# Patient Record
Sex: FEMALE | Race: WHITE | NOT HISPANIC OR LATINO | Employment: FULL TIME | URBAN - METROPOLITAN AREA
[De-identification: names, ages, dates, MRNs, and addresses within clinical notes are randomized per-mention and may not be internally consistent; named-entity substitution may affect disease eponyms.]

---

## 2021-01-20 ENCOUNTER — MEDICAL CORRESPONDENCE (OUTPATIENT)
Dept: HEALTH INFORMATION MANAGEMENT | Facility: CLINIC | Age: 31
End: 2021-01-20

## 2021-01-20 ENCOUNTER — TRANSFERRED RECORDS (OUTPATIENT)
Dept: HEALTH INFORMATION MANAGEMENT | Facility: CLINIC | Age: 31
End: 2021-01-20

## 2021-01-21 ENCOUNTER — TRANSCRIBE ORDERS (OUTPATIENT)
Dept: OTHER | Age: 31
End: 2021-01-21

## 2021-01-21 ENCOUNTER — TRANSFERRED RECORDS (OUTPATIENT)
Dept: HEALTH INFORMATION MANAGEMENT | Facility: CLINIC | Age: 31
End: 2021-01-21

## 2021-01-21 DIAGNOSIS — R14.0 GASSINESS: Primary | ICD-10-CM

## 2021-02-07 ENCOUNTER — HEALTH MAINTENANCE LETTER (OUTPATIENT)
Age: 31
End: 2021-02-07

## 2021-02-09 NOTE — TELEPHONE ENCOUNTER
REFERRAL INFORMATION:    Referring Provider:  RIAZ Huddleston     Referring Clinic:  [facility]     Reason for Visit/Diagnosis: Gassiness, Polyp of colon      FUTURE VISIT INFORMATION:    Appointment Date: 3/2/2021    Appointment Time: 11 AM      NOTES STATUS DETAILS   OFFICE NOTE from Referring Provider Received/ Care Everywhere 1/20/2021 Office visit with Dom Siddiqi PA-C   OFFICE NOTE from Other Specialist N/A    HOSPITAL DISCHARGE SUMMARY/  ED VISITS N/A    OPERATIVE REPORT N/A    MEDICATION LIST N/A         ENDOSCOPY  N/A    COLONOSCOPY N/A    ERCP N/A    EUS N/A    STOOL TESTING N/A    PERTINENT LABS Internal    PATHOLOGY REPORTS (RELATED) N/A    IMAGING (CT, MRI, EGD, MRCP, Small Bowel Follow Through/SBT, MR/CT Enterography) N/A

## 2021-03-02 ENCOUNTER — VIRTUAL VISIT (OUTPATIENT)
Dept: GASTROENTEROLOGY | Facility: CLINIC | Age: 31
End: 2021-03-02
Payer: COMMERCIAL

## 2021-03-02 ENCOUNTER — PRE VISIT (OUTPATIENT)
Dept: GASTROENTEROLOGY | Facility: CLINIC | Age: 31
End: 2021-03-02

## 2021-03-02 VITALS — HEIGHT: 67 IN | BODY MASS INDEX: 26.84 KG/M2 | WEIGHT: 171 LBS

## 2021-03-02 DIAGNOSIS — K62.89 RECTAL PAIN: Primary | ICD-10-CM

## 2021-03-02 DIAGNOSIS — R14.0 BLOATING: ICD-10-CM

## 2021-03-02 PROCEDURE — 99204 OFFICE O/P NEW MOD 45 MIN: CPT | Mod: 95 | Performed by: INTERNAL MEDICINE

## 2021-03-02 RX ORDER — LEVONORGESTREL/ETHIN.ESTRADIOL 0.1-0.02MG
1 TABLET ORAL
COMMUNITY
Start: 2013-01-01 | End: 2022-02-14

## 2021-03-02 RX ORDER — LACTOBACILLUS RHAMNOSUS GG 10B CELL
1 CAPSULE ORAL 2 TIMES DAILY
COMMUNITY
End: 2023-03-29

## 2021-03-02 RX ORDER — HYDROCORTISONE 25 MG/G
CREAM TOPICAL 2 TIMES DAILY PRN
Qty: 28 G | Refills: 3 | Status: SHIPPED | OUTPATIENT
Start: 2021-03-02 | End: 2021-04-10

## 2021-03-02 SDOH — HEALTH STABILITY: MENTAL HEALTH: HOW MANY STANDARD DRINKS CONTAINING ALCOHOL DO YOU HAVE ON A TYPICAL DAY?: NOT ASKED

## 2021-03-02 SDOH — HEALTH STABILITY: MENTAL HEALTH: HOW OFTEN DO YOU HAVE A DRINK CONTAINING ALCOHOL?: NOT ASKED

## 2021-03-02 SDOH — HEALTH STABILITY: MENTAL HEALTH: HOW OFTEN DO YOU HAVE 6 OR MORE DRINKS ON ONE OCCASION?: NOT ASKED

## 2021-03-02 ASSESSMENT — MIFFLIN-ST. JEOR: SCORE: 1538.28

## 2021-03-02 ASSESSMENT — PAIN SCALES - GENERAL: PAINLEVEL: SEVERE PAIN (6)

## 2021-03-02 NOTE — NURSING NOTE
"Chief Complaint   Patient presents with     Consult     New patient consult for gassiness and colon polyp. Video visit.        Vitals:    03/02/21 1040   Weight: 77.6 kg (171 lb)   Height: 1.702 m (5' 7\")       Body mass index is 26.78 kg/m .    Edie Nielsen LPN      "

## 2021-03-02 NOTE — LETTER
3/2/2021         RE: Selina Jimenez  [id number] [address] [address]        Dear Colleague,    Thank you for referring your patient, Selina Jimenez, to the [facility] [place]. Please see a copy of my visit note below.    Addendum:    Patient continuing with low FODMAP diet. Will refer to Alfa Shrestha for further counseling.    Selina is a 30 year old who is being evaluated via a billable video visit.      How would you like to obtain your AVS? MyChart  If the video visit is dropped, the invitation should be resent by: Text to cell phone: [phone]  Will anyone else be joining your video visit? No      Video-Visit Details    Type of service:  Video Visit    Video Start Time: 11:00    Video End Time: 11:45    Originating Location (pt. Location):home    Distant Location (provider location):  [facility] [place]     Platform used for Video Visit: [employer]    GI CLINIC VISIT    REASON FOR CONSULTATION:   Gassiness, Hx of colon polyps    ASSESSMENT/PLAN:  29yo relatively healthy F here for initial clinic visit. Patient reports symptoms of gassiness and bloating and painful defecation and development of hemorrhoids. One episode of rectal bleeding. Her primary symptoms include bloating and increased flatus which maybe due to aerophagia (excess air swallowing), functional bloating, dietary factors, celiac disease, and less likely bacterial overgrowth or gastroparesis ( no risk factors for these). No associated constipation. In light of the rectal bleeding and painful defecation, I would like to pursue a colonoscopy. Furthermore, the patient reports a hx of ?rectal polyps on prior colonoscopy, which were benign. I didn't see a colonoscopy report. Patient was advised to have a repeat colonoscopy in 5 years    Recommendations:  - Colonoscopy to evaluate the hx of rectal polyps  - Hydrocortisone 2.5% cream (Anusol-HC) for the hemorrhoids  -  Referral to Alfa Shrestha dietitian regarding a low FODMAP diet  - Restart fiber supplementation with citrucel or benefiber    RTC in 3 months    Thank you for this consultation.  It was a pleasure to participate in the care of this patient; please contact us with any further questions.  A total of 45 minutes spent on the date of the encounter doing chart review, history and exam, documentation and further activities as noted above. This note was created with voice recognition software, and while reviewed for accuracy, typos may remain.     Kathi Banda MD  Associate Professor of Medicine  Division of Gastroenterology, Hepatology and Nutrition  [facility]      HPI  29yo relatively healthy F here for initial clinic visit. Patient reports symptoms of gassiness and bloating and pain with defecation and development of hemorrhoids Per patient, last spring (April-May) started feeling very bloated and gassy, especially at night. She made dietary changes and did weight watchers and lost 20 pounds and started feeling better. Over the summer she had symptoms on and off. Last fall, school started up and she started developing gassiness and bloating again. Most of Dec and Jan she reported daily symptoms. Every day after eating a emal and within 30 min, she felt bloated gassiness and gassy (associated with smelliness). She thought that certain foods were triggering so she started experimenting with different foods for several months. Recently over the last month her PCP recommended low LODMAP diet. Since starting this, she states this is the first month that her symptoms have gotten better. She is not having as much gas and she thinks her flatus is less stinky. She still thinks that she has hemorrhoids.    Her bowel habits are irregular. She reports a BM every other day to every three days. Consistency depends on what she eats. BSFS 3-4. Currently though she is reporting some pain with defecation. No straining or  pushing. No hard stools. She eats fruits and vegetables and has recently increased her fiber intake. She tried fiber supplements but was worried about using benefiber (which is a wheat dextran).She has tried to cut out wheat because this may be a trigger (though on the occasion when she did have some wheat, it was not detrimental. No hematochezia or melena but she did see a small amount of blood in the toilet in the Fall. Pt states she has had colon polyps- benign per patient. She states that she had severe abdominal pain in the past which prompted the colonoscopy. She states that she was told to get colonoscopy every 5 years.    No family hx of any GI cancers. No weight loss. No prior endoscopies. Labs reviewed from 2/5/21    ROS:    No fevers or chillshe  No weight loss  No blurry vision, double vision or change in vision  No sore throat  No lymphadenopathy  No headache, paraesthesias, or weakness in a limb  No shortness of breath or wheezing  No chest pain or pressure  No arthralgias or myalgias  No rashes or skin changes  No odynophagia or dysphagia  No BRBPR, hematochezia, melena  No dysuria, frequency or urgency  No hot/cold intolerance or polyria  No anxiety or depression      PERTINENT PAST MEDICAL HISTORY:  Hyperlipidemia    PREVIOUS SURGERIES:  None    PREVIOUS ENDOSCOPY:  None    ALLERGIES:     Allergies   Allergen Reactions     Cefaclor Rash     Pt states she had allergy as a infant (Rash)          PERTINENT MEDICATIONS:    Current Outpatient Medications:      Calcium 200 MG TABS, , Disp: , Rfl:      lactobacillus rhamnosus, GG, (CULTURELL) capsule, Take 1 capsule by mouth 2 times daily, Disp: , Rfl:      levonorgestrel-ethinyl estradiol (AVIANE) 0.1-20 MG-MCG tablet, Take 1 tablet by mouth, Disp: , Rfl:      Multiple Vitamins-Minerals (MULTIVITAMIN WOMEN PO), , Disp: , Rfl:     SOCIAL HISTORY:  Single, never smoker, occasional EtOh,     FAMILY HISTORY:    Family History   Problem Relation Age of Onset  "    Bladder Cancer Maternal Grandfather      Lymphoma Paternal Grandmother        PHYSICAL EXAMINATION:  Constitutional: aaox3, cooperative, pleasant, not dyspneic/diaphoretic, no acute distress  Vitals reviewed: Ht 1.702 m (5' 7\")   Wt 77.6 kg (171 lb)   BMI 26.78 kg/m    Wt:   Wt Readings from Last 2 Encounters:   03/02/21 77.6 kg (171 lb)      Eyes: Sclera anicteric/injected  Respiratory: Unlabored breathing  Skin: , no jaundice  Psych: Normal affect  MSK: Normal gait    PERTINENT STUDIES:  Most recent labs 2/5/21     WBC 7.0 3.2 - 11.0 10*9/L   RBC 4.53 3.77 - 5.24 10*12/L   HGB 13.0 11.2 - 15.5 g/dL   HCT 39.2 34.3 - 46.0 %   MCV 86.5 81.4 - 99.0 fL   MCH 28.7 26.7 - 33.1 pg   MCHC 33.2 31.6 - 35.5 g/dL   RDW 12.2 11.3 - 14.6 %    130 - 375 10*9/L   Neutrophils % 50.5 %   Lymphocytes % 36.2 %   Monocytes % 10.8 %   Eosinophils % 1.7 %   Basophils % 0.7 %   Immature Granulocytes % 0.1 %   Neutrophils Absolute 3.5 1.5 - 7.6 10*9/L   Lymphocytes Absolute 2.5 0.8 - 3.3 10*9/L   Monocytes Absolute 0.8 0.2 - 0.9 10*9/L   Eosinophils Absolute 0.1 0.0 - 0.4 10*9/L   Basophils Absolute 0.1 0.0 - 0.1 10*9/L   Immature Granulocytes Absolute 0.01 0.00 - 0.06 10*9/L       Component Value Ref Range   Sodium 141 134 - 143 mEq/L   Potassium 3.8 3.4 - 5.1 mEq/L   Chloride 105 99 - 110 mEq/L   Carbon Dioxide 26 19 - 29 mEq/L   Anion Gap 10.0 3.0 - 15.0 mEq/L   Blood Urea Nitrogen 10 5 - 24 mg/dL   Creatinine 0.78 0.40 - 1.00 mg/dL   Glomerular Filtration Rate  >60 mL/min/1.73 m*2   Calcium 9.2 8.4 - 10.5 mg/dL   Glucose 90 70 - 99 mg/dL   Protein, Total 7.0 6.0 - 8.0 g/dL   Albumin 3.9 3.5 - 5.0 g/dL   Alkaline Phosphatase 57 40 - 150 IU/L   Aspartate Aminotransferase 19 10 - 40 IU/L   Alanine Aminotransferase 15 6 - 31 IU/L   Bilirubin, Total 0.4 0.2 - 1.2 mg/dL       Again, thank you for allowing me to participate in the care of your patient.      Sincerely,    Kathi Banda MD    "

## 2021-03-02 NOTE — PATIENT INSTRUCTIONS
It was a pleasure to talk to you today.    My recommendations are summarized below:    - Colonoscopy to evaluate the hx of rectal polyps  - Hydrocortisone 2.5% cream (Anusol-HC) for the hemorrhoids  - Referral to Alfa Shrestha dietitian regarding a low FODMAP diet  - Restart fiber supplementation with citrucel or benefiber      RTC in 2 months    -Dr Banda  Gastroenterology

## 2021-03-02 NOTE — PROGRESS NOTES
Addendum:    Patient continuing with low FODMAP diet. Will refer to Alfa Shrestha for further counseling.    Selina is a 30 year old who is being evaluated via a billable video visit.      How would you like to obtain your AVS? MyChart  If the video visit is dropped, the invitation should be resent by: Text to cell phone: [phone]  Will anyone else be joining your video visit? No      Video Start Time: 11:00      Video-Visit Details    Type of service:  Video Visit    Video End Time: 11:45    Originating Location (pt. Location):home    Distant Location (provider location):  [facility] [place]     Platform used for Video Visit: [employer]    GI CLINIC VISIT    REASON FOR CONSULTATION:   Gassiness, Hx of colon polyps      ASSESSMENT/PLAN:  31yo relatively healthy F here for initial clinic visit. Patient reports symptoms of gassiness and bloating and painful defecation and development of hemorrhoids. One episode of rectal bleeding. Her primary symptoms include bloating and increased flatus which maybe due to aerophagia (excess air swallowing), functional bloating, dietary factors, celiac disease, and less likely bacterial overgrowth or gastroparesis ( no risk factors for these). No associated constipation. In light of the rectal bleeding and painful defecation, I would like to pursue a colonoscopy. Furthermore, the patient reports a hx of ?rectal polyps on prior colonoscopy, which were benign. I didn't see a colonoscopy report. Patient was advised to have a repeat colonoscopy in 5 years    Recommendations:  - Colonoscopy to evaluate the hx of rectal polyps  - Hydrocortisone 2.5% cream (Anusol-HC) for the hemorrhoids  - Referral to Alfa Shrestha dietitian regarding a low FODMAP diet  - Restart fiber supplementation with citrucel or benefiber    RTC in 3 months    Thank you for this consultation.  It was a pleasure to participate in the care of this patient; please contact us with any  further questions.  A total of 45 minutes spent on the date of the encounter doing chart review, history and exam, documentation and further activities as noted above. This note was created with voice recognition software, and while reviewed for accuracy, typos may remain.       Kathi Banda MD  Associate Professor of Medicine  Division of Gastroenterology, Hepatology and Nutrition  [facility]      HPI  29yo relatively healthy F here for initial clinic visit. Patient reports symptoms of gassiness and bloating and pain with defecation and development of hemorrhoids Per patient, last spring (April-May) started feeling very bloated and gassy, especially at night. She made dietary changes and did weight watchers and lost 20 pounds and started feeling better. Over the summer she had symptoms on and off. Last fall, school started up and she started developing gassiness and bloating again. Most of Dec and Jan she reported daily symptoms. Every day after eating a emal and within 30 min, she felt bloated gassiness and gassy (associated with smelliness). She thought that certain foods were triggering so she started experimenting with different foods for several months. Recently over the last month her PCP recommended low LODMAP diet. Since starting this, she states this is the first month that her symptoms have gotten better. She is not having as much gas and she thinks her flatus is less stinky. She still thinks that she has hemorrhoids.    Her bowel habits are irregular. She reports a BM every other day to every three days. Consistency depends on what she eats. BSFS 3-4. Currently though she is reporting some pain with defecation. No straining or pushing. No hard stools. She eats fruits and vegetables and has recently increased her fiber intake. She tried fiber supplements but was worried about using benefiber (which is a wheat dextran).She has tried to cut out wheat because this may be a trigger (though on  "the occasion when she did have some wheat, it was not detrimental. No hematochezia or melena but she did see a small amount of blood in the toilet in the Fall. Pt states she has had colon polyps- benign per patient. She states that she had severe abdominal pain in the past which prompted the colonoscopy. She states that she was told to get colonoscopy every 5 years.    No family hx of any GI cancers. No weight loss. No prior endoscopies. Labs reviewed from 2/5/21    ROS:    No fevers or chillshe  No weight loss  No blurry vision, double vision or change in vision  No sore throat  No lymphadenopathy  No headache, paraesthesias, or weakness in a limb  No shortness of breath or wheezing  No chest pain or pressure  No arthralgias or myalgias  No rashes or skin changes  No odynophagia or dysphagia  No BRBPR, hematochezia, melena  No dysuria, frequency or urgency  No hot/cold intolerance or polyria  No anxiety or depression      PERTINENT PAST MEDICAL HISTORY:  Hyperlipidemia    PREVIOUS SURGERIES:  None    PREVIOUS ENDOSCOPY:  None    ALLERGIES:     Allergies   Allergen Reactions     Cefaclor Rash     Pt states she had allergy as a infant (Rash)          PERTINENT MEDICATIONS:    Current Outpatient Medications:      Calcium 200 MG TABS, , Disp: , Rfl:      lactobacillus rhamnosus, GG, (CULTURELL) capsule, Take 1 capsule by mouth 2 times daily, Disp: , Rfl:      levonorgestrel-ethinyl estradiol (AVIANE) 0.1-20 MG-MCG tablet, Take 1 tablet by mouth, Disp: , Rfl:      Multiple Vitamins-Minerals (MULTIVITAMIN WOMEN PO), , Disp: , Rfl:     SOCIAL HISTORY:  Single, never smoker, occasional EtOh,     FAMILY HISTORY:    Family History   Problem Relation Age of Onset     Bladder Cancer Maternal Grandfather      Lymphoma Paternal Grandmother        PHYSICAL EXAMINATION:  Constitutional: aaox3, cooperative, pleasant, not dyspneic/diaphoretic, no acute distress  Vitals reviewed: Ht 1.702 m (5' 7\")   Wt 77.6 kg (171 lb)   BMI 26.78 " kg/m    Wt:   Wt Readings from Last 2 Encounters:   03/02/21 77.6 kg (171 lb)      Eyes: Sclera anicteric/injected  Respiratory: Unlabored breathing  Skin: , no jaundice  Psych: Normal affect  MSK: Normal gait      PERTINENT STUDIES:  Most recent labs 2/5/21     WBC 7.0 3.2 - 11.0 10*9/L   RBC 4.53 3.77 - 5.24 10*12/L   HGB 13.0 11.2 - 15.5 g/dL   HCT 39.2 34.3 - 46.0 %   MCV 86.5 81.4 - 99.0 fL   MCH 28.7 26.7 - 33.1 pg   MCHC 33.2 31.6 - 35.5 g/dL   RDW 12.2 11.3 - 14.6 %    130 - 375 10*9/L   Neutrophils % 50.5 %   Lymphocytes % 36.2 %   Monocytes % 10.8 %   Eosinophils % 1.7 %   Basophils % 0.7 %   Immature Granulocytes % 0.1 %   Neutrophils Absolute 3.5 1.5 - 7.6 10*9/L   Lymphocytes Absolute 2.5 0.8 - 3.3 10*9/L   Monocytes Absolute 0.8 0.2 - 0.9 10*9/L   Eosinophils Absolute 0.1 0.0 - 0.4 10*9/L   Basophils Absolute 0.1 0.0 - 0.1 10*9/L   Immature Granulocytes Absolute 0.01 0.00 - 0.06 10*9/L       Component Value Ref Range   Sodium 141 134 - 143 mEq/L   Potassium 3.8 3.4 - 5.1 mEq/L   Chloride 105 99 - 110 mEq/L   Carbon Dioxide 26 19 - 29 mEq/L   Anion Gap 10.0 3.0 - 15.0 mEq/L   Blood Urea Nitrogen 10 5 - 24 mg/dL   Creatinine 0.78 0.40 - 1.00 mg/dL   Glomerular Filtration Rate  >60 mL/min/1.73 m*2   Calcium 9.2 8.4 - 10.5 mg/dL   Glucose 90 70 - 99 mg/dL   Protein, Total 7.0 6.0 - 8.0 g/dL   Albumin 3.9 3.5 - 5.0 g/dL   Alkaline Phosphatase 57 40 - 150 IU/L   Aspartate Aminotransferase 19 10 - 40 IU/L   Alanine Aminotransferase 15 6 - 31 IU/L   Bilirubin, Total 0.4 0.2 - 1.2 mg/dL

## 2021-04-10 RX ORDER — HYDROCORTISONE 25 MG/G
CREAM TOPICAL 2 TIMES DAILY PRN
Qty: 28 G | Refills: 3 | Status: SHIPPED | OUTPATIENT
Start: 2021-04-10 | End: 2022-06-13

## 2021-04-14 ENCOUNTER — TELEPHONE (OUTPATIENT)
Dept: GASTROENTEROLOGY | Facility: OUTPATIENT CENTER | Age: 31
End: 2021-04-14

## 2021-04-14 NOTE — TELEPHONE ENCOUNTER
Patient is scheduled for COLON with Dr. GRIMALDO    Spoke with: PATIENT    Date of Procedure: 5/12    Location: [facility]    Sedation Type MAC    Conscious Sedation- Needs  for 6 hours after the procedure  MAC/General-Needs  for 24 hours after procedure    Pre-op for Unit [facility] MAC and ORN    (if yes advise patient they will need a pre-op prior to procedure)      Is patient on blood thinners? -N (If yes- inform patient to follow up with PCP or provider for follow up instructions)     Informed patient they will need an adult  Y  Cannot take any type of public or medical transportation alone    Informed Patient of COVID Test Requirement Y-SCHED    Preferred Pharmacy for Pre Prescription N/A    Confirmed Nurse will call to complete assessment Y    Additional comments: N/A

## 2021-04-28 DIAGNOSIS — Z11.59 ENCOUNTER FOR SCREENING FOR OTHER VIRAL DISEASES: ICD-10-CM

## 2021-05-04 ENCOUNTER — TELEPHONE (OUTPATIENT)
Dept: GASTROENTEROLOGY | Facility: OUTPATIENT CENTER | Age: 31
End: 2021-05-04

## 2021-05-07 ENCOUNTER — TELEPHONE (OUTPATIENT)
Dept: GASTROENTEROLOGY | Facility: OUTPATIENT CENTER | Age: 31
End: 2021-05-07

## 2021-05-10 DIAGNOSIS — Z11.59 ENCOUNTER FOR SCREENING FOR OTHER VIRAL DISEASES: ICD-10-CM

## 2021-05-10 LAB
SARS-COV-2 RNA RESP QL NAA+PROBE: NORMAL
SPECIMEN SOURCE: NORMAL

## 2021-05-10 PROCEDURE — U0005 INFEC AGEN DETEC AMPLI PROBE: HCPCS | Performed by: INTERNAL MEDICINE

## 2021-05-10 PROCEDURE — U0003 INFECTIOUS AGENT DETECTION BY NUCLEIC ACID (DNA OR RNA); SEVERE ACUTE RESPIRATORY SYNDROME CORONAVIRUS 2 (SARS-COV-2) (CORONAVIRUS DISEASE [COVID-19]), AMPLIFIED PROBE TECHNIQUE, MAKING USE OF HIGH THROUGHPUT TECHNOLOGIES AS DESCRIBED BY CMS-2020-01-R: HCPCS | Performed by: INTERNAL MEDICINE

## 2021-05-11 ENCOUNTER — TELEPHONE (OUTPATIENT)
Dept: GASTROENTEROLOGY | Facility: OUTPATIENT CENTER | Age: 31
End: 2021-05-11

## 2021-05-11 LAB
LABORATORY COMMENT REPORT: NORMAL
SARS-COV-2 RNA RESP QL NAA+PROBE: NEGATIVE
SPECIMEN SOURCE: NORMAL

## 2021-05-11 NOTE — TELEPHONE ENCOUNTER
Patient taking any blood thinners ? No      Heart disease ? denies     Lung disease ? denies     Sleep apnea ? denies     Diabetic ? denies     Kidney disease ?denies     Electronic implanted medical devices ? denies     PTSD ? N/a      Prep instructions reviewed with patient ? Instructions, policy,MAC sedation plan reviewed. Advised patient to have someone stay with them for 24 hours   post exam.     Yes    Pharmacy : N/a     Indication for procedure : Rectal pain; Bloating     Referring provider : Dom Siddiqi PA-C      Arrival Time : 12 PM    Covid test 5-10 at [facility]    Patient states she is not pregnant or lactating

## 2021-05-12 ENCOUNTER — DOCUMENTATION ONLY (OUTPATIENT)
Dept: GASTROENTEROLOGY | Facility: OUTPATIENT CENTER | Age: 31
End: 2021-05-12
Payer: COMMERCIAL

## 2021-05-12 ENCOUNTER — TRANSFERRED RECORDS (OUTPATIENT)
Dept: HEALTH INFORMATION MANAGEMENT | Facility: CLINIC | Age: 31
End: 2021-05-12

## 2021-05-13 LAB — COPATH REPORT: NORMAL

## 2021-07-11 NOTE — RESULT ENCOUNTER NOTE
Dear Selina,    I am writing to follow up on your colonoscopy report. We removed one polyp from your colon but the pathology report showed this was not a pre-cancerous polyp only a lymphoid aggregate (which is a normal finding). I know that in the past you had been advised to have a colonoscopy every five years but in light of a completely normal colonoscopy exam, I am not sure that repeat colonoscopy every five years is needed. I think it may be okay to have a repeat colonoscopy starting at age 45. If anything changes, feel free to follow up with me in the GI clinic.     Kathi Banda MD  Gastroenterology

## 2021-09-18 ENCOUNTER — HEALTH MAINTENANCE LETTER (OUTPATIENT)
Age: 31
End: 2021-09-18

## 2022-02-12 ASSESSMENT — ENCOUNTER SYMPTOMS
BREAST MASS: 0
DIZZINESS: 0
MYALGIAS: 0
JOINT SWELLING: 0
CONSTIPATION: 0
SHORTNESS OF BREATH: 0
HEMATURIA: 0
DIARRHEA: 0
CHILLS: 0
EYE PAIN: 0
FEVER: 0
HEARTBURN: 0
PARESTHESIAS: 0
WEAKNESS: 0
NAUSEA: 0
COUGH: 0
DYSURIA: 0
ARTHRALGIAS: 0
ABDOMINAL PAIN: 1
NERVOUS/ANXIOUS: 1
HEMATOCHEZIA: 0
SORE THROAT: 0
HEADACHES: 1
PALPITATIONS: 0
FREQUENCY: 0

## 2022-02-14 ENCOUNTER — OFFICE VISIT (OUTPATIENT)
Dept: INTERNAL MEDICINE | Facility: CLINIC | Age: 32
End: 2022-02-14
Payer: COMMERCIAL

## 2022-02-14 VITALS
HEIGHT: 67 IN | BODY MASS INDEX: 27.61 KG/M2 | OXYGEN SATURATION: 98 % | TEMPERATURE: 98.5 F | SYSTOLIC BLOOD PRESSURE: 116 MMHG | RESPIRATION RATE: 16 BRPM | HEART RATE: 78 BPM | WEIGHT: 175.9 LBS | DIASTOLIC BLOOD PRESSURE: 62 MMHG

## 2022-02-14 DIAGNOSIS — Z00.00 HEALTHCARE MAINTENANCE: Primary | ICD-10-CM

## 2022-02-14 LAB
ERYTHROCYTE [DISTWIDTH] IN BLOOD BY AUTOMATED COUNT: 12.2 % (ref 10–15)
HCT VFR BLD AUTO: 36.8 % (ref 35–47)
HGB BLD-MCNC: 12.2 G/DL (ref 11.7–15.7)
MCH RBC QN AUTO: 28.6 PG (ref 26.5–33)
MCHC RBC AUTO-ENTMCNC: 33.2 G/DL (ref 31.5–36.5)
MCV RBC AUTO: 86 FL (ref 78–100)
PLATELET # BLD AUTO: 272 10E3/UL (ref 150–450)
RBC # BLD AUTO: 4.27 10E6/UL (ref 3.8–5.2)
WBC # BLD AUTO: 8.9 10E3/UL (ref 4–11)

## 2022-02-14 PROCEDURE — 80061 LIPID PANEL: CPT | Performed by: NURSE PRACTITIONER

## 2022-02-14 PROCEDURE — 82306 VITAMIN D 25 HYDROXY: CPT | Performed by: NURSE PRACTITIONER

## 2022-02-14 PROCEDURE — 84443 ASSAY THYROID STIM HORMONE: CPT | Performed by: NURSE PRACTITIONER

## 2022-02-14 PROCEDURE — 99385 PREV VISIT NEW AGE 18-39: CPT | Performed by: NURSE PRACTITIONER

## 2022-02-14 PROCEDURE — 80048 BASIC METABOLIC PNL TOTAL CA: CPT | Performed by: NURSE PRACTITIONER

## 2022-02-14 PROCEDURE — 36415 COLL VENOUS BLD VENIPUNCTURE: CPT | Performed by: NURSE PRACTITIONER

## 2022-02-14 PROCEDURE — 85027 COMPLETE CBC AUTOMATED: CPT | Performed by: NURSE PRACTITIONER

## 2022-02-14 RX ORDER — MULTIVITAMIN WITH IRON
1 TABLET ORAL DAILY
COMMUNITY
End: 2022-12-19

## 2022-02-14 ASSESSMENT — ENCOUNTER SYMPTOMS
FEVER: 0
NAUSEA: 0
SORE THROAT: 0
CONSTIPATION: 0
EYE PAIN: 0
BREAST MASS: 0
MYALGIAS: 0
DIZZINESS: 0
DIARRHEA: 0
COUGH: 0
PARESTHESIAS: 0
SHORTNESS OF BREATH: 0
ABDOMINAL PAIN: 1
NERVOUS/ANXIOUS: 1
DYSURIA: 0
WEAKNESS: 0
CHILLS: 0
PALPITATIONS: 0
FREQUENCY: 0
HEADACHES: 1
HEARTBURN: 0
JOINT SWELLING: 0
HEMATOCHEZIA: 0
ARTHRALGIAS: 0
HEMATURIA: 0

## 2022-02-14 ASSESSMENT — MIFFLIN-ST. JEOR: SCORE: 1555.51

## 2022-02-14 NOTE — NURSING NOTE
"Physical  Vital signs:  Temp: 98.5  F (36.9  C) Temp src: Oral BP: 116/62 Pulse: 78   Resp: 16 SpO2: 98 %     Height: 170.2 cm (5' 7\") Weight: 79.8 kg (175 lb 14.4 oz)  Estimated body mass index is 27.55 kg/m  as calculated from the following:    Height as of this encounter: 1.702 m (5' 7\").    Weight as of this encounter: 79.8 kg (175 lb 14.4 oz).          "

## 2022-02-14 NOTE — PROGRESS NOTES
SUBJECTIVE:   CC: Selina Jimenez is an 31 year old woman who presents for preventive health visit.       Patient has been advised of split billing requirements and indicates understanding: Yes  HPI            Today's PHQ-2 Score:   PHQ-2 ( 1999 Pfizer) 2/12/2022   Q1: Little interest or pleasure in doing things 0   Q2: Feeling down, depressed or hopeless 0   PHQ-2 Score 0   PHQ-2 Total Score (12-17 Years)- Positive if 3 or more points; Administer PHQ-A if positive -   Q1: Little interest or pleasure in doing things Not at all   Q2: Feeling down, depressed or hopeless Not at all   PHQ-2 Score 0       Abuse: Current or Past (Physical, Sexual or Emotional) - No  Do you feel safe in your environment? Yes    Have you ever done Advance Care Planning? (For example, a Health Directive, POLST, or a discussion with a medical provider or your loved ones about your wishes): No, advance care planning information given to patient to review.  Patient declined advance care planning discussion at this time.    Social History     Tobacco Use     Smoking status: Never Smoker     Smokeless tobacco: Never Used   Substance Use Topics     Alcohol use: Yes     If you drink alcohol do you typically have >3 drinks per day or >7 drinks per week? No    Alcohol Use 2/12/2022   Prescreen: >3 drinks/day or >7 drinks/week? No       Reviewed orders with patient.  Reviewed health maintenance and updated orders accordingly - Yes  BP Readings from Last 3 Encounters:   02/14/22 116/62    Wt Readings from Last 3 Encounters:   02/14/22 79.8 kg (175 lb 14.4 oz)   03/02/21 77.6 kg (171 lb)                  There is no problem list on file for this patient.    History reviewed. No pertinent surgical history.    Social History     Tobacco Use     Smoking status: Never Smoker     Smokeless tobacco: Never Used   Substance Use Topics     Alcohol use: Yes     Family History   Problem Relation Age of Onset     Bladder Cancer Maternal Grandfather       Lymphoma Paternal Grandmother          Current Outpatient Medications   Medication Sig Dispense Refill     Calcium 200 MG TABS        lactobacillus rhamnosus, GG, (CULTURELL) capsule Take 1 capsule by mouth 2 times daily       magnesium 250 MG tablet Take 1 tablet by mouth daily       Multiple Vitamins-Minerals (MULTIVITAMIN WOMEN PO)        hydrocortisone, Perianal, (HYDROCORTISONE) 2.5 % cream Place rectally 2 times daily as needed for hemorrhoids (Patient not taking: Reported on 2/14/2022) 28 g 3       Breast Cancer Screening:    Breast CA Risk Assessment (FHS-7) 2/12/2022   Do you have a family history of breast, colon, or ovarian cancer? No / Unknown         Patient under 40 years of age: Routine Mammogram Screening not recommended.   Pertinent mammograms are reviewed under the imaging tab.    History of abnormal Pap smear: NO - age 21-29 PAP every 3 years recommended     Reviewed and updated as needed this visit by clinical staff  Tobacco  Allergies  Meds   Med Hx  Surg Hx  Fam Hx  Soc Hx       Reviewed and updated as needed this visit by Provider                   Review of Systems   Constitutional: Negative for chills and fever.   HENT: Negative for congestion, ear pain, hearing loss and sore throat.    Eyes: Negative for pain and visual disturbance.   Respiratory: Negative for cough and shortness of breath.    Cardiovascular: Negative for chest pain, palpitations and peripheral edema.   Gastrointestinal: Positive for abdominal pain. Negative for constipation, diarrhea, heartburn, hematochezia and nausea.   Breasts:  Negative for tenderness, breast mass and discharge.   Genitourinary: Negative for dysuria, frequency, genital sores, hematuria, pelvic pain, urgency, vaginal bleeding and vaginal discharge.   Musculoskeletal: Negative for arthralgias, joint swelling and myalgias.   Skin: Negative for rash.   Neurological: Positive for headaches. Negative for dizziness, weakness and paresthesias.  "  Psychiatric/Behavioral: Negative for mood changes. The patient is nervous/anxious.      Had colonoscopy for GI bloating, discomfort.  Had polyp removed, has hemorrhoids  Lactose free, not total gluten free     OBJECTIVE:   /62   Pulse 78   Temp 98.5  F (36.9  C) (Oral)   Resp 16   Ht 1.702 m (5' 7\")   Wt 79.8 kg (175 lb 14.4 oz)   LMP 01/17/2022 (Exact Date)   SpO2 98%   BMI 27.55 kg/m    Physical Exam  GENERAL: healthy, alert and no distress  EYES: Eyes grossly normal to inspection, PERRL and conjunctivae and sclerae normal  NECK: no adenopathy, no asymmetry, masses, or scars and thyroid normal to palpation  RESP: lungs clear to auscultation - no rales, rhonchi or wheezes  CV: regular rate and rhythm, normal S1 S2, no S3 or S4, no murmur, click or rub, no peripheral edema and peripheral pulses strong  ABDOMEN: soft, nontender, no hepatosplenomegaly, no masses and bowel sounds normal  MS: no gross musculoskeletal defects noted, no edema  PSYCH: mentation appears normal, affect normal/bright        ASSESSMENT/PLAN:       ICD-10-CM    1. Healthcare maintenance  Z00.00 CBC with platelets     Basic metabolic panel  (Ca, Cl, CO2, Creat, Gluc, K, Na, BUN)     Lipid panel reflex to direct LDL Non-fasting     TSH with free T4 reflex     Vitamin D Deficiency           COUNSELING:  Reviewed preventive health counseling, as reflected in patient instructions    Estimated body mass index is 27.55 kg/m  as calculated from the following:    Height as of this encounter: 1.702 m (5' 7\").    Weight as of this encounter: 79.8 kg (175 lb 14.4 oz).    Weight management plan: Discussed healthy diet and exercise guidelines    She reports that she has never smoked. She has never used smokeless tobacco.      Counseling Resources:  ATP IV Guidelines  Pooled Cohorts Equation Calculator  Breast Cancer Risk Calculator  BRCA-Related Cancer Risk Assessment: FHS-7 Tool  FRAX Risk Assessment  ICSI Preventive Guidelines  Dietary " Guidelines for Americans, 2010  USDA's MyPlate  ASA Prophylaxis  Lung CA Screening    Erna Wu NP  [facility]

## 2022-02-15 LAB
ANION GAP SERPL CALCULATED.3IONS-SCNC: 7 MMOL/L (ref 3–14)
BUN SERPL-MCNC: 16 MG/DL (ref 7–30)
CALCIUM SERPL-MCNC: 9.3 MG/DL (ref 8.5–10.1)
CHLORIDE BLD-SCNC: 104 MMOL/L (ref 94–109)
CHOLEST SERPL-MCNC: 253 MG/DL
CO2 SERPL-SCNC: 26 MMOL/L (ref 20–32)
CREAT SERPL-MCNC: 0.72 MG/DL (ref 0.52–1.04)
DEPRECATED CALCIDIOL+CALCIFEROL SERPL-MC: 36 UG/L (ref 20–75)
FASTING STATUS PATIENT QL REPORTED: NO
GFR SERPL CREATININE-BSD FRML MDRD: >90 ML/MIN/1.73M2
GLUCOSE BLD-MCNC: 97 MG/DL (ref 70–99)
HDLC SERPL-MCNC: 84 MG/DL
LDLC SERPL CALC-MCNC: 145 MG/DL
NONHDLC SERPL-MCNC: 169 MG/DL
POTASSIUM BLD-SCNC: 3.8 MMOL/L (ref 3.4–5.3)
SODIUM SERPL-SCNC: 137 MMOL/L (ref 133–144)
TRIGL SERPL-MCNC: 118 MG/DL
TSH SERPL DL<=0.005 MIU/L-ACNC: 1.52 MU/L (ref 0.4–4)

## 2022-04-04 ENCOUNTER — OFFICE VISIT (OUTPATIENT)
Dept: INTERNAL MEDICINE | Facility: CLINIC | Age: 32
End: 2022-04-04
Payer: COMMERCIAL

## 2022-04-04 VITALS
RESPIRATION RATE: 12 BRPM | OXYGEN SATURATION: 96 % | TEMPERATURE: 98.6 F | HEIGHT: 67 IN | WEIGHT: 176 LBS | SYSTOLIC BLOOD PRESSURE: 116 MMHG | BODY MASS INDEX: 27.62 KG/M2 | HEART RATE: 89 BPM | DIASTOLIC BLOOD PRESSURE: 64 MMHG

## 2022-04-04 DIAGNOSIS — Z12.4 CERVICAL CANCER SCREENING: ICD-10-CM

## 2022-04-04 PROCEDURE — 99213 OFFICE O/P EST LOW 20 MIN: CPT | Performed by: NURSE PRACTITIONER

## 2022-04-04 PROCEDURE — G0145 SCR C/V CYTO,THINLAYER,RESCR: HCPCS | Performed by: NURSE PRACTITIONER

## 2022-04-04 RX ORDER — LEVONORGESTREL/ETHIN.ESTRADIOL 0.1-0.02MG
1 TABLET ORAL DAILY
Qty: 84 TABLET | Refills: 3 | Status: SHIPPED | OUTPATIENT
Start: 2022-04-04 | End: 2023-03-29

## 2022-04-04 NOTE — PROGRESS NOTES
"  Assessment & Plan     Cervical cancer screening    - Pap Screen reflex to HPV if ASCUS - recommend age 25 - 29  - levonorgestrel-ethinyl estradiol (AVIANE) 0.1-20 MG-MCG tablet; Take 1 tablet by mouth daily    Repeat 3 years             Erna Wu NP  [facility] IRINEO Marks is a 31 year old who presents for the following health issues : Pap screening.    History of Present Illness       Reason for visit:  PAP screening    She eats 2-3 servings of fruits and vegetables daily.She consumes 0 sweetened beverage(s) daily.She exercises with enough effort to increase her heart rate 10 to 19 minutes per day.  She exercises with enough effort to increase her heart rate 4 days per week.   She is taking medications regularly.             Returns for screening Pap and OCP refill      Review of Systems   Constitutional, HEENT, cardiovascular, pulmonary, gi and gu systems are negative, except as otherwise noted.      Objective    /64   Pulse 89   Temp 98.6  F (37  C) (Tympanic)   Resp 12   Ht 1.702 m (5' 7\")   Wt 79.8 kg (176 lb)   LMP 03/12/2022 (Exact Date)   SpO2 96%   Breastfeeding No   BMI 27.57 kg/m    Body mass index is 27.57 kg/m .  Physical Exam   GENERAL: healthy, alert and no distress   (female): normal female external genitalia, normal urethral meatus  and vaginal mucosa pink, moist, well rugated                "

## 2022-04-07 LAB
BKR LAB AP GYN ADEQUACY: NORMAL
BKR LAB AP GYN INTERPRETATION: NORMAL
BKR LAB AP HPV REFLEX: NORMAL
BKR LAB AP LMP: NORMAL
BKR LAB AP PREVIOUS ABNORMAL: NORMAL
PATH REPORT.COMMENTS IMP SPEC: NORMAL
PATH REPORT.COMMENTS IMP SPEC: NORMAL
PATH REPORT.RELEVANT HX SPEC: NORMAL

## 2022-06-13 ENCOUNTER — E-VISIT (OUTPATIENT)
Dept: URGENT CARE | Facility: URGENT CARE | Age: 32
End: 2022-06-13
Payer: COMMERCIAL

## 2022-06-13 DIAGNOSIS — Z20.822 SUSPECTED COVID-19 VIRUS INFECTION: Primary | ICD-10-CM

## 2022-06-13 PROCEDURE — 99421 OL DIG E/M SVC 5-10 MIN: CPT | Performed by: PHYSICIAN ASSISTANT

## 2022-06-13 NOTE — PATIENT INSTRUCTIONS
Dear Selina,      Based on your responses, you may have coronavirus (COVID-19).     Will I be tested for COVID-19?  We would like to test you for COVID. I have placed orders for this test.     To schedule: go to your Post-i home page and scroll down to the section that says  You have an appointment that needs to be scheduled  and click the large green button that says  Schedule Now  and follow the steps to find the next available openings.    If you are unable to complete these Post-i scheduling steps, please call [phone] to schedule your testing.     These guidelines are for isolating before returning to work, school or .     For employers, schools and day cares: This is an official notice for this person s medical guidelines for returning in-person.     For health care sites: The CDC gives different isolation and quarantine guidelines for healthcare sites, please check with these sites before arriving.     How do I self-isolate?  You isolate when you have symptoms of COVID or a test shows you have COVID, even if you don t have symptoms.     If you DO have symptoms:  o Stay home and away from others  - For at least 5 days after your symptoms started, AND   - You are fever free for 24 hours (with no medicine that reduces fever), AND  - Your other symptoms are better.  o Wear a mask for 10 full days any time you are around others.    If you DON T have symptoms:  o Stay at home and away from others for at least 5 days after your positive test.  o Wear a mask for 10 full days any time you are around others.    How can I take care of myself?  Over the counter medications may help with your symptoms such as runny or stuffy nose, cough, chills, or fever.  Talk to your care team about your options.     Some people are at high risk of severe illness (for example, you have a weak immune system, you re 65 years or older, or you have certain medical problems). If your risk is high and your symptoms started in  the last 5 to 7 days, we strongly recommend for you to get COVID treatment as soon as possible. Paxlovid, Molnupiravir and the monoclonal antibody treatments are proven safe and effective, make you feel better faster, and prevent hospitalization and death.       To schedule an appointment to discuss COVID treatment, request an appointment on MyChart (select  COVID-19 Treatment ) or call [phone] ([phone]), press 7.      Get lots of rest. Drink extra fluids (unless a doctor has told you not to)    Take Tylenol (acetaminophen) or ibuprofen for fever or pain. If you have liver or kidney problems, ask your family doctor if it's okay to take Tylenol or ibuprofen    Take over the counter medications for your symptoms, as directed by your doctor. You may also talk to your pharmacist.      If you have other health problems (like cancer, heart failure, an organ transplant or severe kidney disease): Call your specialty clinic if you don't feel better in the next 2 days.    Know when to call 911. Emergency warning signs include:  o Trouble breathing or shortness of breath  o Pain or pressure in the chest that doesn't go away  o Feeling confused like you haven't felt before, or not being able to wake up  o Bluish-colored lips or face    Where can I get more information?    [facility] - About COVID-19: [email or web address]     CDC - What to Do If You're Sick: https://www.cdc.gov/coronavirus/2019-ncov/if-you-are-sick/index.html     CDC - Quarantine & Isolation: https://www.cdc.gov/coronavirus/2019-ncov/your-health/quarantine-isolation.html     [facility] clinical trials (COVID-19 research studies): [email or web address]-trials    Below are the COVID-19 hotlines at the [facility] ([facility]). Interpreters are available.  o For health questions: Call 266-620-5410 or 1-718.725.5843 (7 a.m. to 7 p.m.)  o For questions about schools and childcare: Call  453.755.2014 or 1-457.213.9129 (7 a.m. to 7 p.m.)  June 13, 2022  RE:  Selina Jimenez                                                                                                                  [id number] [facility] [address]      To whom it may concern:    I evaluated Selina Jimenez on June 13, 2022. Selina Jimenez should be excused from work/school.     They should let their workplace manager and staffing office know when their quarantine ends.    We can not give an exact date as it depends on the information below. They can calculate this on their own or work with their manager/staffing office to calculate this. (For example if they were exposed on 10/04, they would have to quarantine for 14 full days. That would be through 10/18. They could return on 10/19.)    Quarantine Guidelines:    If patient receives a positive COVID-19 test result, they should follow the guidance of those who are giving the results. Usually the return to work is 10 (or in some cases 20 days from symptom onset.) If they work at [employer], they must be cleared by Employee Occupational Health and Safety to return to work.      If patient receives a negative COVID-19 test result and did not have a high risk exposure to someone with a known positive COVID-19 test, they can return to work once they're free of fever for 24 hours without fever-reducing medication and their symptoms are improving or resolved.    If patient receives a negative COVID-19 test and if they had a high risk exposure to someone who has tested positive for COVID-19 then they can return to work 14 days after their last contact with the positive individual    Note: If there is ongoing exposure to the covid positive person, this quarantine period may be longer than 14 days. (For example, if they are continually exposed to their child, who tested positive and cannot isolate from them, then the quarantine of 7-14 days can't start until their child  is no longer contagious. This is typically 10 days from onset to the child's symptoms. So the total duration may be 17-24 days in this case.)     Sincerely,  Rupali Feng PA-C          o

## 2022-11-04 ENCOUNTER — TELEPHONE (OUTPATIENT)
Dept: GASTROENTEROLOGY | Facility: CLINIC | Age: 32
End: 2022-11-04

## 2022-11-07 ENCOUNTER — TELEPHONE (OUTPATIENT)
Dept: GASTROENTEROLOGY | Facility: CLINIC | Age: 32
End: 2022-11-07

## 2022-11-07 NOTE — TELEPHONE ENCOUNTER
LVMx2 and sent my chart message     Pt needs f/u with Molly Cortez after previously seeing Dr. Banda

## 2022-12-19 ENCOUNTER — OFFICE VISIT (OUTPATIENT)
Dept: INTERNAL MEDICINE | Facility: CLINIC | Age: 32
End: 2022-12-19
Payer: COMMERCIAL

## 2022-12-19 VITALS
HEART RATE: 82 BPM | WEIGHT: 185.1 LBS | OXYGEN SATURATION: 98 % | HEIGHT: 67 IN | SYSTOLIC BLOOD PRESSURE: 132 MMHG | BODY MASS INDEX: 29.05 KG/M2 | RESPIRATION RATE: 16 BRPM | DIASTOLIC BLOOD PRESSURE: 64 MMHG | TEMPERATURE: 97.6 F

## 2022-12-19 DIAGNOSIS — K21.00 GASTROESOPHAGEAL REFLUX DISEASE WITH ESOPHAGITIS, UNSPECIFIED WHETHER HEMORRHAGE: Primary | ICD-10-CM

## 2022-12-19 DIAGNOSIS — R14.0 ABDOMINAL BLOATING: ICD-10-CM

## 2022-12-19 PROCEDURE — 99213 OFFICE O/P EST LOW 20 MIN: CPT | Performed by: NURSE PRACTITIONER

## 2022-12-19 ASSESSMENT — PAIN SCALES - GENERAL: PAINLEVEL: NO PAIN (0)

## 2022-12-19 NOTE — PROGRESS NOTES
"  Assessment & Plan     Gastroesophageal reflux disease with esophagitis, unspecified whether hemorrhage    - Helicobacter pylori Antigen Stool; Future  - Helicobacter pylori Antigen Stool    Abdominal bloating        Daily miralax, lactose and gluten free diet  Has GI OV 4/2023         BMI:   Estimated body mass index is 28.99 kg/m  as calculated from the following:    Height as of this encounter: 1.702 m (5' 7\").    Weight as of this encounter: 84 kg (185 lb 1.6 oz).           Erna Wu NP  [facility] IRINEO Marks is a 32 year old, presenting for the following health issues:  Consult (Patient concerned with lab levels)      History of Present Illness       Reason for visit:  Concerns with: GI issues, low progesterone, headaches, stomach aches/heartburn  Symptom onset:  More than a month  Symptoms include:  Gut rot often, bad gas (burp/fart), regular bloating, headaches  Symptom intensity:  Moderate  Symptom progression:  Staying the same  Had these symptoms before:  Yes  Has tried/received treatment for these symptoms:  Yes  Previous treatment was successful:  No  What makes it better:  Drinking water, laying down, sleep    She eats 4 or more servings of fruits and vegetables daily.She consumes 0 sweetened beverage(s) daily.She exercises with enough effort to increase her heart rate 10 to 19 minutes per day.  She exercises with enough effort to increase her heart rate 4 days per week.   She is taking medications regularly.       GERD/Heartburn  Onset/Duration: 6+ months  Description: sour stomach, bloating, gas  Intensity: moderate  Progression of Symptoms: same  Accompanying Signs & Symptoms:  Does it feel like food gets stuck or trouble swallowing: No  Nausea: No  Vomiting (bloody?): No  Abdominal Pain: No  Black-Tarry stools: No  Bloody stools: No  History:  Previous similar episodes: No  Previous ulcers: No  Precipitating factors:   Caffeine use: No  Alcohol use: " "YES  NSAID/Aspirin use: No  Tobacco use: No  Worse with no particular food or drink.  Alleviating factors: None  Therapies tried and outcome:             Lifestyle changes:  Lactose and gluten free            Medications: Omeprazole (Prilosec)        Review of Systems   Constitutional, HEENT, cardiovascular, pulmonary, gi and gu systems are negative, except as otherwise noted.      Objective    /64 (BP Location: Right arm, Patient Position: Sitting, Cuff Size: Adult Regular)   Pulse 82   Temp 97.6  F (36.4  C) (Tympanic)   Resp 16   Ht 1.702 m (5' 7\")   Wt 84 kg (185 lb 1.6 oz)   LMP 12/14/2022   SpO2 98%   BMI 28.99 kg/m    Body mass index is 28.99 kg/m .  Physical Exam   GENERAL: healthy, alert and no distress  EYES: Eyes grossly normal to inspection, PERRL and conjunctivae and sclerae normal  NECK: no adenopathy, no asymmetry, masses, or scars and thyroid normal to palpation  RESP: lungs clear to auscultation - no rales, rhonchi or wheezes  CV: regular rate and rhythm, normal S1 S2, no S3 or S4, no murmur, click or rub, no peripheral edema and peripheral pulses strong  ABDOMEN: soft, nontender, no hepatosplenomegaly, no masses and bowel sounds normal  MS: no gross musculoskeletal defects noted, no edema  PSYCH: mentation appears normal and anxious                    "

## 2022-12-21 PROCEDURE — 87338 HPYLORI STOOL AG IA: CPT | Performed by: NURSE PRACTITIONER

## 2022-12-26 LAB — H PYLORI AG STL QL IA: NEGATIVE

## 2023-02-23 DIAGNOSIS — R14.0 BLOATING: Primary | ICD-10-CM

## 2023-02-28 ENCOUNTER — TELEPHONE (OUTPATIENT)
Dept: GASTROENTEROLOGY | Facility: CLINIC | Age: 33
End: 2023-02-28
Payer: COMMERCIAL

## 2023-02-28 NOTE — TELEPHONE ENCOUNTER
Called and spoke with patient about rescheduling her 4/14 appt with Dr. Francisco due to the provider being unavailable. At the moment there are no return slots to reschedule the patient with Dr. Francisco. Pt requested to cancel the appointment for now and she will call to reschedule at a later time.

## 2023-03-28 SDOH — ECONOMIC STABILITY: TRANSPORTATION INSECURITY
IN THE PAST 12 MONTHS, HAS LACK OF TRANSPORTATION KEPT YOU FROM MEETINGS, WORK, OR FROM GETTING THINGS NEEDED FOR DAILY LIVING?: NO

## 2023-03-28 SDOH — ECONOMIC STABILITY: INCOME INSECURITY: IN THE LAST 12 MONTHS, WAS THERE A TIME WHEN YOU WERE NOT ABLE TO PAY THE MORTGAGE OR RENT ON TIME?: NO

## 2023-03-28 SDOH — ECONOMIC STABILITY: FOOD INSECURITY: WITHIN THE PAST 12 MONTHS, YOU WORRIED THAT YOUR FOOD WOULD RUN OUT BEFORE YOU GOT MONEY TO BUY MORE.: NEVER TRUE

## 2023-03-28 SDOH — ECONOMIC STABILITY: TRANSPORTATION INSECURITY
IN THE PAST 12 MONTHS, HAS THE LACK OF TRANSPORTATION KEPT YOU FROM MEDICAL APPOINTMENTS OR FROM GETTING MEDICATIONS?: NO

## 2023-03-28 SDOH — ECONOMIC STABILITY: INCOME INSECURITY: HOW HARD IS IT FOR YOU TO PAY FOR THE VERY BASICS LIKE FOOD, HOUSING, MEDICAL CARE, AND HEATING?: NOT HARD AT ALL

## 2023-03-28 SDOH — ECONOMIC STABILITY: FOOD INSECURITY: WITHIN THE PAST 12 MONTHS, THE FOOD YOU BOUGHT JUST DIDN'T LAST AND YOU DIDN'T HAVE MONEY TO GET MORE.: NEVER TRUE

## 2023-03-28 SDOH — HEALTH STABILITY: PHYSICAL HEALTH: ON AVERAGE, HOW MANY MINUTES DO YOU ENGAGE IN EXERCISE AT THIS LEVEL?: 20 MIN

## 2023-03-28 SDOH — HEALTH STABILITY: PHYSICAL HEALTH: ON AVERAGE, HOW MANY DAYS PER WEEK DO YOU ENGAGE IN MODERATE TO STRENUOUS EXERCISE (LIKE A BRISK WALK)?: 5 DAYS

## 2023-03-28 ASSESSMENT — ENCOUNTER SYMPTOMS
NERVOUS/ANXIOUS: 1
DYSURIA: 0
CHILLS: 0
SORE THROAT: 0
CONSTIPATION: 1
HEADACHES: 1
PARESTHESIAS: 0
HEARTBURN: 1
DIZZINESS: 0
FREQUENCY: 1
PALPITATIONS: 0
JOINT SWELLING: 0
ARTHRALGIAS: 0
MYALGIAS: 0
HEMATURIA: 0
EYE PAIN: 0
NAUSEA: 0
BREAST MASS: 0
WEAKNESS: 0
SHORTNESS OF BREATH: 0
DIARRHEA: 1
HEMATOCHEZIA: 0
FEVER: 0
COUGH: 0
ABDOMINAL PAIN: 1

## 2023-03-28 ASSESSMENT — SOCIAL DETERMINANTS OF HEALTH (SDOH)
ARE YOU MARRIED, WIDOWED, DIVORCED, SEPARATED, NEVER MARRIED, OR LIVING WITH A PARTNER?: LIVING WITH PARTNER
IN A TYPICAL WEEK, HOW MANY TIMES DO YOU TALK ON THE PHONE WITH FAMILY, FRIENDS, OR NEIGHBORS?: ONCE A WEEK
HOW OFTEN DO YOU ATTEND CHURCH OR RELIGIOUS SERVICES?: MORE THAN 4 TIMES PER YEAR
DO YOU BELONG TO ANY CLUBS OR ORGANIZATIONS SUCH AS CHURCH GROUPS UNIONS, FRATERNAL OR ATHLETIC GROUPS, OR SCHOOL GROUPS?: NO
HOW OFTEN DO YOU GET TOGETHER WITH FRIENDS OR RELATIVES?: ONCE A WEEK

## 2023-03-28 ASSESSMENT — LIFESTYLE VARIABLES
HOW MANY STANDARD DRINKS CONTAINING ALCOHOL DO YOU HAVE ON A TYPICAL DAY: 1 OR 2
AUDIT-C TOTAL SCORE: 3
HOW OFTEN DO YOU HAVE SIX OR MORE DRINKS ON ONE OCCASION: LESS THAN MONTHLY
HOW OFTEN DO YOU HAVE A DRINK CONTAINING ALCOHOL: 2-4 TIMES A MONTH
SKIP TO QUESTIONS 9-10: 0

## 2023-03-29 ENCOUNTER — OFFICE VISIT (OUTPATIENT)
Dept: PEDIATRICS | Facility: CLINIC | Age: 33
End: 2023-03-29
Payer: COMMERCIAL

## 2023-03-29 VITALS
HEIGHT: 67 IN | HEART RATE: 75 BPM | OXYGEN SATURATION: 100 % | RESPIRATION RATE: 14 BRPM | TEMPERATURE: 98.2 F | BODY MASS INDEX: 28.8 KG/M2 | WEIGHT: 183.5 LBS | DIASTOLIC BLOOD PRESSURE: 70 MMHG | SYSTOLIC BLOOD PRESSURE: 110 MMHG

## 2023-03-29 DIAGNOSIS — R14.0 ABDOMINAL BLOATING: ICD-10-CM

## 2023-03-29 DIAGNOSIS — R14.3 EXCESSIVE GAS: ICD-10-CM

## 2023-03-29 DIAGNOSIS — Z00.00 ROUTINE GENERAL MEDICAL EXAMINATION AT A HEALTH CARE FACILITY: Primary | ICD-10-CM

## 2023-03-29 DIAGNOSIS — Z86.0100 HISTORY OF COLONIC POLYPS: ICD-10-CM

## 2023-03-29 PROCEDURE — 99395 PREV VISIT EST AGE 18-39: CPT | Performed by: NURSE PRACTITIONER

## 2023-03-29 ASSESSMENT — ENCOUNTER SYMPTOMS
JOINT SWELLING: 0
NAUSEA: 0
PALPITATIONS: 0
ABDOMINAL PAIN: 1
DYSURIA: 0
MYALGIAS: 0
EYE PAIN: 0
HEADACHES: 1
PARESTHESIAS: 0
DIARRHEA: 1
COUGH: 0
SHORTNESS OF BREATH: 0
CONSTIPATION: 1
CHILLS: 0
HEARTBURN: 1
DIZZINESS: 0
BREAST MASS: 0
SORE THROAT: 0
ARTHRALGIAS: 0
WEAKNESS: 0
FREQUENCY: 1
HEMATOCHEZIA: 0
NERVOUS/ANXIOUS: 1
FEVER: 0
HEMATURIA: 0

## 2023-03-29 ASSESSMENT — PAIN SCALES - GENERAL: PAINLEVEL: NO PAIN (0)

## 2023-03-29 NOTE — PROGRESS NOTES
SUBJECTIVE:   CC: Selina is an 32 year old who presents for preventive health visit.     Patient has been advised of split billing requirements and indicates understanding: Yes     Healthy Habits:     Getting at least 3 servings of Calcium per day:  Yes    Bi-annual eye exam:  Yes    Dental care twice a year:  Yes    Sleep apnea or symptoms of sleep apnea:  None    Diet:  Gluten-free/reduced and Other    Frequency of exercise:  4-5 days/week    Duration of exercise:  15-30 minutes    Taking medications regularly:  Not Applicable    Medication side effects:  Not applicable    PHQ-2 Total Score: 0    Additional concerns today:  Yes    Colonoscopy in 5/2021 due to history of colonic polyps - no concerns. Recommend repeat every 5 years.   Trey - [facility]crispin    Has been having issues with abdominal bloating and excessive gas. No formal work-up at this point but has received GI referral previously. Unfortunately her appt was cancelled and rescheduled with a PA and she opted not to go. She was referred to a nutritionist through GI and has her first appt on Monday. She has pursued some online food sensitivity testing and labs. Has dabbled in elimination diets. Currently no dairy, low gluten, and low FODMAP.     Today's PHQ-2 Score:   PHQ-2 ( 1999 Pf[employer]) 3/28/2023   Q1: Little interest or pleasure in doing things 0   Q2: Feeling down, depressed or hopeless 0   PHQ-2 Score 0   PHQ-2 Total Score (12-17 Years)- Positive if 3 or more points; Administer PHQ-A if positive -   Q1: Little interest or pleasure in doing things Not at all   Q2: Feeling down, depressed or hopeless Not at all   PHQ-2 Score 0         Social History     Tobacco Use     Smoking status: Never     Smokeless tobacco: Never   Substance Use Topics     Alcohol use: Yes       Alcohol Use 3/28/2023   Prescreen: >3 drinks/day or >7 drinks/week? No     Reviewed orders with patient.  Reviewed health maintenance and updated orders accordingly - Yes  BP Readings from Last  3 Encounters:   03/29/23 110/70   12/19/22 132/64   04/04/22 116/64    Wt Readings from Last 3 Encounters:   03/29/23 83.2 kg (183 lb 8 oz)   12/19/22 84 kg (185 lb 1.6 oz)   04/04/22 79.8 kg (176 lb)            Breast Cancer Screening:    Breast CA Risk Assessment (FHS-7) 2/12/2022   Do you have a family history of breast, colon, or ovarian cancer? No / Unknown       Patient under 40 years of age: Routine Mammogram Screening not recommended.   Pertinent mammograms are reviewed under the imaging tab.    History of abnormal Pap smear: NO - age 30- 65 PAP every 3 years recommended  PAP / HPV Latest Ref Rng & Units 4/4/2022   PAP   Negative for Intraepithelial Lesion or Malignancy (NILM)     Reviewed and updated as needed this visit by clinical staff                  Reviewed and updated as needed this visit by Provider                 There is no problem list on file for this patient.    Past Medical History:   Diagnosis Date     History of colonic polyps     dx age 12, multiple removed, benign     Past Surgical History:   Procedure Laterality Date     TONSILLECTOMY & ADENOIDECTOMY      childhood     Family History   Problem Relation Age of Onset     Bladder Cancer Maternal Grandfather      Prostate Cancer Maternal Grandfather      Lymphoma Paternal Grandmother      Cerebrovascular Disease Paternal Grandmother         only mini     Other Cancer Paternal Grandmother         NH lymphoma     Hypertension Mother      Hyperlipidemia Mother         only borderline to high     Hyperlipidemia Father         only borderline to high     Diabetes Maternal Grandmother      Cerebrovascular Disease Maternal Grandmother      Social History     Socioeconomic History     Marital status: Single     Spouse name: Not on file     Number of children: Not on file     Years of education: Not on file     Highest education level: Not on file   Occupational History     Not on file   Tobacco Use     Smoking status: Never     Smokeless tobacco:  Never   Vaping Use     Vaping Use: Never used   Substance and Sexual Activity     Alcohol use: Yes     Comment: weekends, socially     Drug use: Never     Sexual activity: Yes     Partners: Male     Birth control/protection: Condom   Other Topics Concern     Parent/sibling w/ CABG, MI or angioplasty before 65F 55M? No   Social History Narrative    Lives in [place]. [place] a [place].     Engaged, planning to get  September 2023.     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     Free time: outdoors, walking, jogging, exercise, cross stitch.         Leslie Hutton, DNP, APRN, CNP    03/29/23     Social Determinants of Health     Financial Resource Strain: Low Risk      Difficulty of Paying Living Expenses: Not hard at all   Food Insecurity: No Food Insecurity     Worried About Running Out of Food in the Last Year: Never true     Ran Out of Food in the Last Year: Never true   Transportation Needs: No Transportation Needs     Lack of Transportation (Medical): No     Lack of Transportation (Non-Medical): No   Physical Activity: Insufficiently Active     Days of Exercise per Week: 5 days     Minutes of Exercise per Session: 20 min   Stress: No Stress Concern Present     Feeling of Stress : Not at all   Social Connections: Moderately Isolated     Frequency of Communication with Friends and Family: Once a week     Frequency of Social Gatherings with Friends and Family: Once a week     Attends [employer] Services: More than 4 times per year     Active Member of Clubs or Organizations: No     Attends Club or Organization Meetings: Not on file     Marital Status: Living with partner   Intimate Partner Violence: Not on file   Housing Stability: Low Risk      Unable to Pay for Housing in the Last Year: No     Number of Places Lived in the Last Year: 1     Unstable Housing in the Last Year: No     Current Outpatient Medications   Medication     Calcium 200 MG TABS     Multiple  "Vitamins-Minerals (MULTIVITAMIN WOMEN PO)     No current facility-administered medications for this visit.        Allergies   Allergen Reactions     Cefaclor Rash     Pt states she had allergy as a infant (Rash)        Review of Systems   Constitutional: Negative for chills and fever.   HENT: Negative for congestion, ear pain, hearing loss and sore throat.    Eyes: Negative for pain and visual disturbance.   Respiratory: Negative for cough and shortness of breath.    Cardiovascular: Negative for chest pain, palpitations and peripheral edema.   Gastrointestinal: Positive for abdominal pain, constipation, diarrhea and heartburn. Negative for hematochezia and nausea.   Breasts:  Negative for tenderness, breast mass and discharge.   Genitourinary: Positive for frequency and pelvic pain. Negative for dysuria, genital sores, hematuria, urgency, vaginal bleeding and vaginal discharge.   Musculoskeletal: Negative for arthralgias, joint swelling and myalgias.   Skin: Negative for rash.   Neurological: Positive for headaches. Negative for dizziness, weakness and paresthesias.   Psychiatric/Behavioral: Positive for mood changes. The patient is nervous/anxious.      OBJECTIVE:   /70 (BP Location: Right arm, Patient Position: Sitting, Cuff Size: Adult Regular)   Pulse 75   Temp 98.2  F (36.8  C) (Tympanic)   Resp 14   Ht 1.702 m (5' 7\")   Wt 83.2 kg (183 lb 8 oz)   LMP 02/28/2023   SpO2 100%   BMI 28.74 kg/m    Physical Exam  Constitutional: appears to be in no acute distress, comfortable, pleasant.   Eyes: anicteric, conjunctiva clear without drainage or erythema. DUSTY.   Ears, Nose and Throat: tympanic membranes gray with LR,  nose without nasal discharge. OP: no erythema to posterior pharynx, negative post nasal drainage, tonsils +1 no erythema or exudate.  Neck: supple, thyroid palpable,not enlarged, no nodules   Breast: Exam deferred (deferred after discussion of exam options with patient, no symptoms or " concerns).   Cardiovascular: regular rate and rhythm, normal S1 and S2, no murmurs, rubs or gallops, peripheral pulses full and symmetric; negative peripheral edema   Respiratory: Air entry throughout. Breathing pattern unlabored without the use of accessory muscles. Clear to auscultation A and P, no wheezes or crackles, normal breath sounds.    Gastrointestinal: rounded, soft. Positive bowel sounds x4, nontender, no masses.   Genitourinary: Exam deferred (deferred after discussion of exam options with patient, no symptoms or concerns, pap up to date).   Musculoskeletal: full range of motion, no edema.   Skin: pink, turgor smooth and elastic. Negative for lesions or dryness.  Neurological: normal gait, no tremor.   Psychological: appropriate mood and affect.   Lymphatic: no cervical, axillary, supraclavicular, or infraclavicular lymphadenopathy.    Diagnostic Test Results:  Labs reviewed in Epic    ASSESSMENT/PLAN:   (Z00.00) Routine general medical examination at a health care facility  (primary encounter diagnosis)  Age appropriate screening and preventative care have been addressed today. Vaccinations have been reviewed and are up to date. Patient has been advised to follow a balanced diet. They have been advised to undertake routine aerobic activity. Recommend annual vision exams as well as biannual dental exams. They will follow up for annual physical again in one year.     (R14.0) Abdominal bloating  (R14.3) Excessive gas  Has been having issues with abdominal bloating and excessive gas. No formal work-up at this point but has received GI referral previously. Unfortunately her appt was cancelled and rescheduled with a PA and she opted not to go. She was referred to a nutritionist through GI and has her first appt on Monday. She has pursued some online food sensitivity testing and labs. Has dabbled in elimination diets. Currently no dairy, low gluten, and low FODMAP. Recommend follow-up with nutritionist first.  "Pending what that yields, would consider ruling out celiac and/or referral to GI.     (Z86.010) History of colonic polyps  Colonoscopy in 2021 due to history of colonic polyps - no concerns. Recommend repeat every 5 years.           COUNSELING:  Reviewed preventive health counseling, as reflected in patient instructions  Special attention given to:        Regular exercise       Healthy diet/nutrition       Vision screening       Immunizations       Alcohol Use       Contraception       Family planning       Colorectal Cancer Screening       Consider Hep C screening for all patients one time for ages 18-79 years       HIV screeninx in teen years, 1x in adult years, and at intervals if high risk      BMI:   Estimated body mass index is 28.74 kg/m  as calculated from the following:    Height as of this encounter: 1.702 m (5' 7\").    Weight as of this encounter: 83.2 kg (183 lb 8 oz).       She reports that she has never smoked. She has never used smokeless tobacco.             MARIA D Vogel [facility] JARET  "

## 2023-04-03 ENCOUNTER — VIRTUAL VISIT (OUTPATIENT)
Dept: GASTROENTEROLOGY | Facility: CLINIC | Age: 33
End: 2023-04-03
Payer: COMMERCIAL

## 2023-04-03 DIAGNOSIS — R14.0 BLOATING: Primary | ICD-10-CM

## 2023-04-03 PROCEDURE — 97802 MEDICAL NUTRITION INDIV IN: CPT | Mod: VID | Performed by: DIETITIAN, REGISTERED

## 2023-04-03 PROCEDURE — 99207 PR NO CHARGE LOS: CPT | Mod: VID | Performed by: DIETITIAN, REGISTERED

## 2023-04-03 NOTE — PATIENT INSTRUCTIONS
It was nice meeting you today. Below are the nutrition recommendations we discussed at your visit.    Please let me know if you have any additional questions.    Nutrition Recommendations    Discontinue using straws, chewing gum and carbonated beverages.    Gradually increase fiber in your diet. General recommendation is to consume at least 25-35 grams of fiber per day.    --a few tips to increase fiber:   - choose whole grain/100% whole grain grain and bread. (whole grain/higher fiber gluten free bread)   - eat fruits and vegetables with skins.   - eat whole fresh or frozen vegetables and fruit not juices.   - can add some beans or peas into soup, salads, stews.   - can eat nuts as a snack or along with a meal.   - add tejas seeds or flaxseeds or ground flaxseeds into your non dairy yogurt    --also incorporate some soluble fiber sources in your diet. Some sources include tejas seeds, flaxseeds, oats, oat bran, berries, avocado, oranges, figs, apples, sweet potato, potato, carrots, brussels sprouts, beans especially navy beans, nuts, especially almonds.    Eat 2 kiwi fruit per day.    If you would like to track grams of fiber you consume can use My Fitness Pal or Lose It apps but need the premium versions of these apps to get fiber amounts   OR   if you prefer to try a symptom tracking patricia, then could use My Symptoms Tracker or [employer] patricia.    Focus on the recommendations above (1-4) first, but if you decided that you would like to start a full low FODMAP diet, here is the process below:      To follow a full low FODMAP diet:  - Start with eliminating higher FODMAP foods for 3-4 weeks.   -Then after 3-4 weeks start adding those higher FODMAP foods back into diet. (see process for reintroducing below).    After 3-4 weeks if you have some improvement in symptoms on the low FODMAP diet, then start adding higher FODMAP foods back into diet. Here is the process:    -Start by adding back in 1 higher fodmap food at a time  by eating a small serving of that food each day for 3 days in a row OR you can gradually increase the portion size over the course of the 3 days. For example on day 1, can have 1/4 cup serving, then on day 2, can have 1/3 c and then on day 3, can have a 1/2 cup serving.    -If tolerated, then wait at least one day, and then add another higher FODMAP food back into diet for 3 days in a row and onward.     -If you prefer to reintroduce foods slowly, then you could add one food each week (depending on how quickly you'd like to re-introduce and also depending on if you have a return of symptoms that may linger for a day or so).    -If you notice any symptoms after adding back in a higher FODMAP food than can wait until symptoms improve before trying the next higher FODMAP food.     -Also if you notice a significant increase in symptoms after retrying the first day, you do not need to eat on additional days.    -Plan menus and meals ahead of time to help you stick to the elimination diet.    Here are some low FODMAP recipe websites:    Www.fodmapeveryday.com  IBSfree.net  [employer]    [facility] has a lot more information on a low FODMAP diet, an patricia as well.    [employer] sells prepare low FODMAP meals that you can purchase and reheat. If you decide to order, here is a discount code you can use for your first order. Code = 60off    Follow up in 1-2 months.    For follow-up appointments, please call [phone].    Thank you,    Marta Dugan, MS, RD, LD

## 2023-04-03 NOTE — PROGRESS NOTES
Virtual Visit Details    Type of service:  Video Visit   Video start time: 1:25 PM  Video end time: 2:30 PM    Originating Location (pt. Location): Home    Distant Location (provider location):  On-site    Platform used for Video Visit: [facility] Outpatient Medical Nutrition Therapy      Time Spent:  65 minutes  Session Type:  Initial   Referring Physician:  Mayra Francisco MD  Reason for RD Visit:   Abdominal bloating     Nutrition Assessment:  Patient is a 32 year old female with history that includes abdominal bloating and colonic polyps.    She stated that she has a long history of abdominal bloating, excessive gas, GERD but not having daily symptoms. She reported sometimes has constipation, sometimes solid stools, sometimes runny stools. Sometimes she will have a lot of stool then none for a day. Generally has a BM many days, but may go every other day. Stools are very soft to normal.  She stated that her PCP recommended taking miralax daily which she tried for a month starting in December 2022, but she had a lot more gas and felt very bloated while taking miralax. Awhile ago, she took benefiber but this was not tolerated, caused more symptoms. At times, she still feels bloated after going to the bathroom/after a BM. She has been trying to figure out what is causing her symptoms for many years on her own. She has not previously met with a GI provider. Had an appt with GI MD, but appt had to be cancelled. She was offered a visit with a PA but she wasn't sure if the PA was a gastroenterology PA. Explained at visit today that the PA would specialize in GI, so she was planning to call following visit to get rescheduled. Reviewed appts later in day after visit and she is now scheduled with Molly Cortez PA-C in GI on 4/10/23. She kept food and beverage symptom journals in anticipation of this visit. She hasn't been able to figure out any pattern or specific foods triggering symptoms for  "her. She provider writer with copy of journal in my chart today as well. Based on journals, many days has some bloating, gas, pain. She follows a lactose free diet. Avoids all products containing milk except some butter. She also follows a mostly gluten free diet. In 2021, she followed a low fodmap diet but didn't noticed any change in symptoms. Over the past two weeks, she is trying to eat lower FODMAP but not following strictly. She stated that generally, she eats lower in fodmap anyway since she avoids lactose and gluten. She also drinks a lot of water 72-96 oz per day. She was having issues with sour stomach, heartburn but reported taking prilosec and this has improved. She eats 3 meals and 2-3 snacks per day. She drinks a sparkling beverage most days, sparkling water or occas diet coke. On weekends may have a hard seltzer.     Patient Active Problem List   Diagnosis     History of colonic polyps     Abdominal bloating     Height:   Ht Readings from Last 1 Encounters:   03/29/23 1.702 m (5' 7\")     Weight:  Wt Readings from Last 10 Encounters:   03/29/23 83.2 kg (183 lb 8 oz)   12/19/22 84 kg (185 lb 1.6 oz)   04/04/22 79.8 kg (176 lb)   02/14/22 79.8 kg (175 lb 14.4 oz)   03/02/21 77.6 kg (171 lb)        BMI: Estimated body mass index is 28.74 kg/m  as calculated from the following:    Height as of 3/29/23: 1.702 m (5' 7\").    Weight as of 3/29/23: 83.2 kg (183 lb 8 oz).    Diet Recall:  (some usual/recent meals/snacks/beverages): during the week, works out in am then   Meal Food    Breakfast Protein powder shake or GF cereal or eggs and toast on GF brd   Lunch Salad spring mix, chickne, vegan dression or GF pasta or Chick with buffalo souace   Dinner chicken/ground turkey, potato/GF pasta, veg occas steak or seafood   Snacks AM: Soy yogurt, ,keto GF gran or fruit or vegan GF gran bites, afternoon: trail mix/fruit. Sometimes evening snk if ate earlier: bowl GF cereal with almond milk or PB, banana   Beverages " 72-96 oz Water, sparkling water, coffee, occas diet coke   Alcohol Intake Socially on weekends: ~1 night per weekend, about 2 vodka tonic or hard seltzer     Frequency of eating/taking out meals: 1x/week.     Labs:    Last Comprehensive Metabolic Panel:  Sodium   Date Value Ref Range Status   02/14/2022 137 133 - 144 mmol/L Final     Potassium   Date Value Ref Range Status   02/14/2022 3.8 3.4 - 5.3 mmol/L Final     Chloride   Date Value Ref Range Status   02/14/2022 104 94 - 109 mmol/L Final     Carbon Dioxide (CO2)   Date Value Ref Range Status   02/14/2022 26 20 - 32 mmol/L Final     Anion Gap   Date Value Ref Range Status   02/14/2022 7 3 - 14 mmol/L Final     Glucose   Date Value Ref Range Status   02/14/2022 97 70 - 99 mg/dL Final     Urea Nitrogen   Date Value Ref Range Status   02/14/2022 16 7 - 30 mg/dL Final     Creatinine   Date Value Ref Range Status   02/14/2022 0.72 0.52 - 1.04 mg/dL Final     GFR Estimate   Date Value Ref Range Status   02/14/2022 >90 >60 mL/min/1.73m2 Final     Comment:     Effective December 21, 2021 eGFRcr in adults is calculated using the 2021 CKD-EPI creatinine equation which includes age and gender (Baldo et al., NE, DOI: 10.1056/CBVNno2960543)     Calcium   Date Value Ref Range Status   02/14/2022 9.3 8.5 - 10.1 mg/dL Final     CBC RESULTS:   Recent Labs   Lab Test 02/14/22  1620   WBC 8.9   RBC 4.27   HGB 12.2   HCT 36.8   MCV 86   MCH 28.6   MCHC 33.2   RDW 12.2          Pertinent Medications/vitamin and mineral supplements:   Had taken a probiotic supplement gummy x 2 months but didn't noticed any improvement in symptoms so discontinued.   Current Outpatient Medications   Medication     Calcium 200 MG TABS     Multiple Vitamins-Minerals (MULTIVITAMIN WOMEN PO)     No current facility-administered medications for this visit.       Food Allergies:  NKFA   Physical Activity:  In the morning 3-5 times per week before work: walks or jog (20 minutes_+ and strength  workouts and HIIT (1-2 times per week). On weekends active/chopping firewood and going for a walk    MALNUTRITION:  % Weight Loss:  None noted  % Intake:  No decreased intake noted  Subcutaneous Fat Loss:  None observed  Muscle Loss:  None observed  Fluid Retention:  None noted    Malnutrition Diagnosis: Patient does not meet two of the above criteria necessary for diagnosing malnutrition    Nutrition Prescription: nutrition education    Nutrition Intervention      Provided diet education for abdominal bloating, heartburn. Discussed some potential triggers and those that may be better tolerated. Discussed some specific tips and ideas (see below). Also reviewed low FODMAP diet process. At this time, she would prefer to start with some changes (1-4 below) versus trying a full low fodmap diet. She also plans to rescheduled with GI provider to discuss other testing/recommendations.    Patient verbalized understanding of education provided. See Goals below.     Educational Materials Provided:  Nutrition Care Manual: Low FODMAP nutrition therapy and FODMAP food chart, fiber content of foods.    Goals:    1. Discontinue using straws, chewing gum and carbonated beverages.    2. Gradually increase fiber in your diet. General recommendation is to consume at least 25-35 grams of fiber per day.    --a few tips to increase fiber:   - choose whole grain/100% whole grain grain and bread. (whole grain/higher fiber gluten free bread)   - eat fruits and vegetables with skins.   - eat whole fresh or frozen vegetables and fruit not juices.   - can add some beans or peas into soup, salads, stews.   - can eat nuts as a snack or along with a meal.   - add tejas seeds or flaxseeds or ground flaxseeds into your non dairy yogurt    --also incorporate some soluble fiber sources in your diet. Some sources include tejas seeds, flaxseeds, oats, oat bran, berries, avocado, oranges, figs, apples, sweet potato, potato, carrots, brussels sprouts,  beans especially navy beans, nuts, especially almonds.    3. Eat 2 kiwi fruit per day.    4. If you would like to track grams of fiber you consume can use My Fitness Pal or Lose It apps but need the premium versions of these apps to get fiber amounts   OR   if you prefer to try a symptom tracking patricia, then could use My Symptoms Tracker or [employer] patricia.    Focus on the recommendations above (1-4) first, but if you decided that you would like to start a full low FODMAP diet, here is the process below:    To follow a full low FODMAP diet:  - Start with eliminating higher FODMAP foods for 3-4 weeks.   -Then after 3-4 weeks start adding those higher FODMAP foods back into diet. (see process for reintroducing below).    After 3-4 weeks if you have some improvement in symptoms on the low FODMAP diet, then start adding higher FODMAP foods back into diet. Here is the process:    -Start by adding back in 1 higher fodmap food at a time by eating a small serving of that food each day for 3 days in a row OR you can gradually increase the portion size over the course of the 3 days. For example on day 1, can have 1/4 cup serving, then on day 2, can have 1/3 c and then on day 3, can have a 1/2 cup serving.    -If tolerated, then wait at least one day, and then add another higher FODMAP food back into diet for 3 days in a row and onward.     -If you prefer to reintroduce foods slowly, then you could add one food each week (depending on how quickly you'd like to re-introduce and also depending on if you have a return of symptoms that may linger for a day or so).    -If you notice any symptoms after adding back in a higher FODMAP food than can wait until symptoms improve before trying the next higher FODMAP food.     -Also if you notice a significant increase in symptoms after retrying the first day, you do not need to eat on additional days.    -Plan menus and meals ahead of time to help you stick to the elimination diet.    Here  are some low FODMAP recipe websites:    Www.fodmapeveryday.com  IBSfree.net  [email or web address]    [facility] has a lot more information on a low FODMAP diet, an patricia as well.    [facility] sells prepare low FODMAP meals that you can purchase and reheat. If you decide to order, here is a discount code you can use for your first order. Code = 60off    Nutrition Monitoring and Evaluation: Will monitor adherence to nutrition recommendations at future RD visits.     Further Medical Nutrition Therapy:  Follow-up in 1-2 months (will meet with GI provider first then plans to f/up with RD)    Patient was encouraged to call/contact RD with any further questions.    Marta Dugan MS, RD, LD

## 2023-04-03 NOTE — NURSING NOTE
Is the patient currently in the state of [place]? YES    Visit mode:VIDEO    If the visit is dropped, the patient can be reconnected by: VIDEO VISIT: Text to cell phone: [phone]    Will anyone else be joining the visit? NO      How would you like to obtain your AVS? MyChart    Are changes needed to the allergy or medication list? NO    Reason for visit: Concerns with GI symptoms.     Yessenia Branch VF

## 2023-04-03 NOTE — LETTER
4/3/2023         RE: Selina Jimenez  [address] [facility] [address]        Dear Colleague,    Thank you for referring your patient, Selina Jimenez, to the [facility] [place]. Please see a copy of my visit note below.    Virtual Visit Details    Type of service:  Video Visit   Video start time: 1:25 PM  Video end time: 2:30 PM    Originating Location (pt. Location): Home    Distant Location (provider location):  On-site    Platform used for Video Visit: [facility] Outpatient Medical Nutrition Therapy      Time Spent:  65 minutes  Session Type:  Initial   Referring Physician:  Mayra Francisco MD  Reason for RD Visit:   Abdominal bloating     Nutrition Assessment:  Patient is a 32 year old female with history that includes abdominal bloating and colonic polyps.    She stated that she has a long history of abdominal bloating, excessive gas, GERD but not having daily symptoms. She reported sometimes has constipation, sometimes solid stools, sometimes runny stools. Sometimes she will have a lot of stool then none for a day. Generally has a BM many days, but may go every other day. Stools are very soft to normal.  She stated that her PCP recommended taking miralax daily which she tried for a month starting in December 2022, but she had a lot more gas and felt very bloated while taking miralax. Awhile ago, she took benefiber but this was not tolerated, caused more symptoms. At times, she still feels bloated after going to the bathroom/after a BM. She has been trying to figure out what is causing her symptoms for many years on her own. She has not previously met with a GI provider. Had an appt with GI MD, but appt had to be cancelled. She was offered a visit with a PA but she wasn't sure if the PA was a gastroenterology PA. Explained at visit today that the PA would specialize in GI, so she was planning to call following visit to get  "rescheduled. Reviewed appts later in day after visit and she is now scheduled with Molly Cortez PA-C in GI on 4/10/23. She kept food and beverage symptom journals in anticipation of this visit. She hasn't been able to figure out any pattern or specific foods triggering symptoms for her. She provider writer with copy of journal in my chart today as well. Based on journals, many days has some bloating, gas, pain. She follows a lactose free diet. Avoids all products containing milk except some butter. She also follows a mostly gluten free diet. In 2021, she followed a low fodmap diet but didn't noticed any change in symptoms. Over the past two weeks, she is trying to eat lower FODMAP but not following strictly. She stated that generally, she eats lower in fodmap anyway since she avoids lactose and gluten. She also drinks a lot of water 72-96 oz per day. She was having issues with sour stomach, heartburn but reported taking prilosec and this has improved. She eats 3 meals and 2-3 snacks per day. She drinks a sparkling beverage most days, sparkling water or occas diet coke. On weekends may have a hard seltzer.     Patient Active Problem List   Diagnosis    History of colonic polyps    Abdominal bloating     Height:   Ht Readings from Last 1 Encounters:   03/29/23 1.702 m (5' 7\")     Weight:  Wt Readings from Last 10 Encounters:   03/29/23 83.2 kg (183 lb 8 oz)   12/19/22 84 kg (185 lb 1.6 oz)   04/04/22 79.8 kg (176 lb)   02/14/22 79.8 kg (175 lb 14.4 oz)   03/02/21 77.6 kg (171 lb)        BMI: Estimated body mass index is 28.74 kg/m  as calculated from the following:    Height as of 3/29/23: 1.702 m (5' 7\").    Weight as of 3/29/23: 83.2 kg (183 lb 8 oz).    Diet Recall:  (some usual/recent meals/snacks/beverages): during the week, works out in am then   Meal Food    Breakfast Protein powder shake or GF cereal or eggs and toast on GF brd   Lunch Salad spring mix, chickne, vegan dression or GF pasta or Chick with buffalo " souace   Dinner chicken/ground turkey, potato/GF pasta, veg occas steak or seafood   Snacks AM: Soy yogurt, ,keto GF gran or fruit or vegan GF gran bites, afternoon: trail mix/fruit. Sometimes evening snk if ate earlier: bowl GF cereal with almond milk or PB, banana   Beverages 72-96 oz Water, sparkling water, coffee, occas diet coke   Alcohol Intake Socially on weekends: ~1 night per weekend, about 2 vodka tonic or hard seltzer     Frequency of eating/taking out meals: 1x/week.     Labs:    Last Comprehensive Metabolic Panel:  Sodium   Date Value Ref Range Status   02/14/2022 137 133 - 144 mmol/L Final     Potassium   Date Value Ref Range Status   02/14/2022 3.8 3.4 - 5.3 mmol/L Final     Chloride   Date Value Ref Range Status   02/14/2022 104 94 - 109 mmol/L Final     Carbon Dioxide (CO2)   Date Value Ref Range Status   02/14/2022 26 20 - 32 mmol/L Final     Anion Gap   Date Value Ref Range Status   02/14/2022 7 3 - 14 mmol/L Final     Glucose   Date Value Ref Range Status   02/14/2022 97 70 - 99 mg/dL Final     Urea Nitrogen   Date Value Ref Range Status   02/14/2022 16 7 - 30 mg/dL Final     Creatinine   Date Value Ref Range Status   02/14/2022 0.72 0.52 - 1.04 mg/dL Final     GFR Estimate   Date Value Ref Range Status   02/14/2022 >90 >60 mL/min/1.73m2 Final     Comment:     Effective December 21, 2021 eGFRcr in adults is calculated using the 2021 CKD-EPI creatinine equation which includes age and gender (Baldo bryant al., NEJ, DOI: 10.1056/EXAXco3235907)     Calcium   Date Value Ref Range Status   02/14/2022 9.3 8.5 - 10.1 mg/dL Final     CBC RESULTS:   Recent Labs   Lab Test 02/14/22  1620   WBC 8.9   RBC 4.27   HGB 12.2   HCT 36.8   MCV 86   MCH 28.6   MCHC 33.2   RDW 12.2          Pertinent Medications/vitamin and mineral supplements:   Had taken a probiotic supplement gummy x 2 months but didn't noticed any improvement in symptoms so discontinued.   Current Outpatient Medications   Medication     Calcium 200 MG TABS    Multiple Vitamins-Minerals (MULTIVITAMIN WOMEN PO)     No current facility-administered medications for this visit.       Food Allergies:  NKFA   Physical Activity:  In the morning 3-5 times per week before work: walks or jog (20 minutes_+ and strength workouts and HIIT (1-2 times per week). On weekends active/chopping firewood and going for a walk    MALNUTRITION:  % Weight Loss:  None noted  % Intake:  No decreased intake noted  Subcutaneous Fat Loss:  None observed  Muscle Loss:  None observed  Fluid Retention:  None noted    Malnutrition Diagnosis: Patient does not meet two of the above criteria necessary for diagnosing malnutrition    Nutrition Prescription: nutrition education    Nutrition Intervention      Provided diet education for abdominal bloating, heartburn. Discussed some potential triggers and those that may be better tolerated. Discussed some specific tips and ideas (see below). Also reviewed low FODMAP diet process. At this time, she would prefer to start with some changes (1-4 below) versus trying a full low fodmap diet. She also plans to rescheduled with GI provider to discuss other testing/recommendations.    Patient verbalized understanding of education provided. See Goals below.     Educational Materials Provided:  Nutrition Care Manual: Low FODMAP nutrition therapy and FODMAP food chart, fiber content of foods.    Goals:    Discontinue using straws, chewing gum and carbonated beverages.    Gradually increase fiber in your diet. General recommendation is to consume at least 25-35 grams of fiber per day.    --a few tips to increase fiber:   - choose whole grain/100% whole grain grain and bread. (whole grain/higher fiber gluten free bread)   - eat fruits and vegetables with skins.   - eat whole fresh or frozen vegetables and fruit not juices.   - can add some beans or peas into soup, salads, stews.   - can eat nuts as a snack or along with a meal.   - add tejas seeds or  flaxseeds or ground flaxseeds into your non dairy yogurt    --also incorporate some soluble fiber sources in your diet. Some sources include tejas seeds, flaxseeds, oats, oat bran, berries, avocado, oranges, figs, apples, sweet potato, potato, carrots, brussels sprouts, beans especially navy beans, nuts, especially almonds.    Eat 2 kiwi fruit per day.    If you would like to track grams of fiber you consume can use My Fitness Pal or Lose It apps but need the premium versions of these apps to get fiber amounts   OR   if you prefer to try a symptom tracking patricia, then could use My Symptoms Tracker or [employer] patricia.    Focus on the recommendations above (1-4) first, but if you decided that you would like to start a full low FODMAP diet, here is the process below:    To follow a full low FODMAP diet:  - Start with eliminating higher FODMAP foods for 3-4 weeks.   -Then after 3-4 weeks start adding those higher FODMAP foods back into diet. (see process for reintroducing below).    After 3-4 weeks if you have some improvement in symptoms on the low FODMAP diet, then start adding higher FODMAP foods back into diet. Here is the process:    -Start by adding back in 1 higher fodmap food at a time by eating a small serving of that food each day for 3 days in a row OR you can gradually increase the portion size over the course of the 3 days. For example on day 1, can have 1/4 cup serving, then on day 2, can have 1/3 c and then on day 3, can have a 1/2 cup serving.    -If tolerated, then wait at least one day, and then add another higher FODMAP food back into diet for 3 days in a row and onward.     -If you prefer to reintroduce foods slowly, then you could add one food each week (depending on how quickly you'd like to re-introduce and also depending on if you have a return of symptoms that may linger for a day or so).    -If you notice any symptoms after adding back in a higher FODMAP food than can wait until symptoms improve  before trying the next higher FODMAP food.     -Also if you notice a significant increase in symptoms after retrying the first day, you do not need to eat on additional days.    -Plan menus and meals ahead of time to help you stick to the elimination diet.    Here are some low FODMAP recipe websites:    Www.fodmapeveryday.com  IBSfree.net  [employer]    [facility] has a lot more information on a low FODMAP diet, an patricia as well.    [employer] sells prepare low FODMAP meals that you can purchase and reheat. If you decide to order, here is a discount code you can use for your first order. Code = 60off    Nutrition Monitoring and Evaluation: Will monitor adherence to nutrition recommendations at future RD visits.     Further Medical Nutrition Therapy:  Follow-up in 1-2 months (will meet with GI provider first then plans to f/up with RD)    Patient was encouraged to call/contact RD with any further questions.    Marta Dugan, MS, RD, LD

## 2023-04-10 ENCOUNTER — VIRTUAL VISIT (OUTPATIENT)
Dept: GASTROENTEROLOGY | Facility: CLINIC | Age: 33
End: 2023-04-10
Payer: COMMERCIAL

## 2023-04-10 DIAGNOSIS — R19.8 IRREGULAR BOWEL HABITS: ICD-10-CM

## 2023-04-10 DIAGNOSIS — R14.3 FLATUS: ICD-10-CM

## 2023-04-10 DIAGNOSIS — R14.0 BLOATING: Primary | ICD-10-CM

## 2023-04-10 DIAGNOSIS — R10.9 ABDOMINAL DISCOMFORT: ICD-10-CM

## 2023-04-10 PROCEDURE — 99215 OFFICE O/P EST HI 40 MIN: CPT | Mod: VID | Performed by: DIETITIAN, REGISTERED

## 2023-04-10 PROCEDURE — 99417 PROLNG OP E/M EACH 15 MIN: CPT | Mod: VID | Performed by: DIETITIAN, REGISTERED

## 2023-04-10 NOTE — PATIENT INSTRUCTIONS
It was a pleasure taking care of you today.  I've included a brief summary of our discussion and care plan from today's visit below.  Please review this information with your primary care provider.  ______________________________________________________________________    My recommendations are summarized as follows:  - Can continue Hydrocortisone 2.5% cream (Anusol-HC) for the hemorrhoids  - Continue to meet with Marta Dugan dietitian regarding diet modifications  - Complete x-ray  to assess for for stool burden  - Complete labs for celiac testing (after eating gluten for at least 2-4 weeks - equivalent to 1 slice of bread daily)  - Continue Citrucel 1 Tbs per day, can increase up to 1.5-2 Tbs if needed  - Aim for at least 64 oz of water per day  - Continue daily physical activity, walking, running, yoga are great!  - Try squatty potty or stool to get knees above hips  - Try IB Diogenes (or generic enteric coated peppermint oil), can start with 1 capsule twice daily increasing up to 2 capsules three times per day per day depending on symptoms  - Can continue Prilosec (omeprazole) or Pepcid (famotidine) if finding helpful     -- please see scheduling information provided below     Return to GI Clinic in 4 months to review your progress.    ______________________________________________________________________    How do I schedule labs, imaging studies, or procedures that were ordered in clinic today?     Labs: To schedule lab appointment at [facility], use my chart or call [phone]. If you have a [facility] lab closer to home where you are regularly seen you can give them a call.     Procedures: If a colonoscopy, upper endoscopy, breath test, esophageal manometry, or pH impedence was ordered today, our endoscopy team will call you to schedule this. If you have not heard from our endoscopy team within a week, please call [phone] to schedule.     Imaging Studies: If you were scheduled for a  CT scan, X-ray, MRI, ultrasound, HIDA scan or other imaging study, please call [phone] to have this scheduled.     Referral: If a referral to another specialty was ordered, expect a phone call or follow instructions above. If you have not heard from anyone regarding your referral in a week, please call our clinic to check the status.     Who do I call with any questions after my visit?  Please be in touch if there are any further questions that arise following today's visit.  There are multiple ways to contact your gastroenterology care team.      During business hours, you may reach a Gastroenterology nurse at [phone]    To schedule or reschedule an appointment, please call [phone].     You can always send a secure message through Waluz[employer].  Waluzi messages are answered by your nurse or doctor typically within 24 hours.  Please allow extra time on weekends and holidays.      For urgent/emergent questions after business hours, you may reach the on-call GI Fellow by contacting the [facility]  at [phone].     How will I get the results of any tests ordered?    You will receive all of your results.  If you have signed up for [employer], any tests ordered at your visit will be available to you after your provider reviews them.  Typically this takes 1-2 weeks.  If there are urgent results that require a change in your care plan, your provider or nurse will call you to discuss the next steps.      What is Waluzi?  Waluzi is a secure way for you to access all of your healthcare records from the [facility].  It is a web based computer program, so you can sign on to it from any location.  It also allows you to send secure messages to your care team.  I recommend signing up for Waluzi access if you have not already done so and are comfortable with using a computer.      How to I schedule a follow-up visit?  If you did not schedule a follow-up visit today, please call  [phone] to schedule a follow-up office visit.      Sincerely,    Molly Cortez PA-C  Division of Gastroenterology, Hepatology & Nutrition  [facility]

## 2023-04-10 NOTE — NURSING NOTE
Is the patient currently in the state of [place]? YES    Visit mode:VIDEO    If the visit is dropped, the patient can be reconnected by: VIDEO VISIT: Send to e-mail at: [email or web address]    Will anyone else be joining the visit? NO    How would you like to obtain your AVS? MyChart    Are changes needed to the allergy or medication list? NO    Reason for visit: Concerns regarding GI related symptoms

## 2023-04-10 NOTE — PROGRESS NOTES
Virtual Visit Details    Type of service:  Video Visit     Originating Location (pt. Location): Home    Distant Location (provider location):  Off-site  Platform used for Video Visit: [facility]      GI CLINIC VISIT    REASON FOR CONSULTATION:   Gassiness, Hx of colon polyps      ASSESSMENT/PLAN:  31yo relatively healthy F here for follow up clinic visit.     #Bloating, flatus  #Irregular bowel pattern  #History of hemorrhoids  #Abdominal discomfort  Patient reports ongoing symptoms of gassiness and bloating, irregular bowel pattern with straining and incomplete evacuation and recurrence of hemorrhoids. Colonoscopy in 2021 with similar sx was unremarkable. She is following lactose free diet, and mainly gluten free diet without significant improvement in her sx. Her primary symptoms include bloating and increased flatus which maybe due to aerophagia (excess air swallowing), functional bloating, constipation/stool burden, dietary factors, celiac disease, and less likely bacterial overgrowth or gastroparesis (no risk factors for these).  We will start with x-ray to assess for stool burden to see if this may be contributing to her symptoms.  We will also do testing for celiac disease with serologies after reincorporating gluten into diet for at least 2 to 4 weeks.  She met with our dietitian Marta last week which she found helpful, has found some improvements with decreasing aerophagia-she should continue these lifestyle measures.  Additionally continue dietary fiber in addition to fiber supplement with Citrucel daily in hopes of regulating bowel pattern.  Encouraged adequate hydration and physical activity to assist with bowel movements.  Additionally given straining and incomplete evacuation discussed pelvic floor and use of squatty potty/stool to get knees above hips to relax pelvic floor and assist with ease of defecation.  If x-ray shows stool burden consider magnesium for laxation as she did not previously tolerate  MiraLAX.  Would start at 200 to 400 mg at nighttime.  Additionally discussed that she could use IBgard/enteric-coated peppermint to help with symptoms.  Not interested in Gas-X or Beano at this time.  Pending above work-up and symptoms Future considerations include SIBO breath testing, gastric emptying study, and referral to GI health psychology.     Recommendations:  - Can continue Hydrocortisone 2.5% cream (Anusol-HC) for the hemorrhoids  - Continue to meet with Marta Dugan dietitian regarding diet modifications  - Complete x-ray  to assess for for stool burden  - Complete labs for celiac testing (after eating gluten for at least 2-4 weeks - equivalent to 1 slice of bread daily)  - Continue Citrucel 1 Tbs per day, can increase up to 1.5-2 Tbs if needed  - Aim for at least 64 oz of water per day  - Continue daily physical activity, walking, running, yoga are great!  - Try squatty potty or stool to get knees above hips  - Try IB Diogenes (or generic enteric coated peppermint oil), can start with 1 capsule twice daily increasing up to 2 capsules three times per day per day depending on symptoms    RTC in 3 months    Thank you for this consultation.  It was a pleasure to participate in the care of this patient; please contact us with any further questions.  A total of 75minutes spent on the date of the encounter doing chart review, history and exam, documentation and further activities as noted above. This note was created with voice recognition software, and while reviewed for accuracy, typos may remain.       Molly Cortez PA-C  Division of Gastroenterology, Hepatology & Nutrition  [facility]      HPI  33 yo relatively healthy F here for follow up. She was seen in [facility] clinic 3/2021 for initial clinic visit with Dr. Banda.    At initial visit 3/2021 (history per Dr. Banda)  Selina reported gassiness and bloating and pain with defecation and development of hemorrhoids. She started feeling bloated and  gassy, especially at night April/May 2020. She made dietary changes and did weight watchers and lost 20 pounds and started feeling better. Last fall, school started up and she started developing gassiness and bloating again, with near daily symptoms. Every day within 30 minutes after eating a meal bloated and gassy (associated with smelliness). She thought that certain foods were triggering so she started. Her PCP recommended low LODMAP diet, after institution her symptoms started getting better.     Selina reported a BM every other day to every three days. Consistency depends on what she eats. BSFS 3-4. Currently though she is reporting some pain with defecation. No straining or pushing. No hard stools. She eats fruits and vegetables and has recently increased her fiber intake. She tried fiber supplements but was worried about using benefiber (which is a wheat dextran).She has tried to cut out wheat because this may be a trigger (though on the occasion when she did have some wheat, it was not detrimental. No hematochezia or melena but she did see a small amount of blood in the toilet in the fall. She still thinks that she has hemorrhoids. Pt states she has had colon polyps- benign per patient. She states that she had severe abdominal pain in the past which prompted the colonoscopy. She states that she was told to get colonoscopy every 5 years.    No family hx of any GI cancers. No weight loss. No prior endoscopies. Labs reviewed from 2/5/21    ----    After last visit she had ileocolonoscopy 5/2021 with Dr. Banda which was normal.    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  Occurs throughout the day but worse after eating.  Reports flatus seems excessive compared to her family and is foul-smelling, particularly at night.  Notes that  "sometimes she feels full and bloated with meals which impacts her ability to eat large amounts.  She also notes \"sour \"stomach in the morning and sometimes at night (night time sourness also associated with nausea without vomiting).  Started Prilosec for this with slight improvement.  No epigastric or substernal burning.  No acid brash.  No odynophagia or dysphagia.   Avoids lactose/dairy and generally gluten (about 75% of time per her report).    BM Pattern -would often alternate between loose stool and formed (Tully 6 and Tully 3-4).  Typically would have  bowel movement daily then skip a day or two without bowel movements then have 2-3 lstools per day.  Over past week since increasing dietary fiber and starting Citrucel stools have been more solid, typically daily Tully 3-4.  Does note straining and positional movements.  Endorses feeling of incomplete evacuation.  No digitation.  Notes hemorrhoid has recurred with irritation and able to feel on outside.  No bleeding.  Denies hematochezia or melena.  Reports primary care recommended MiraLAX to her in the past, she tried 1 capful daily for a month and found that this actually exacerbated her bloating and gas.    She recently met with Marta Dugan RD (last week). At visit they reviewed aerophagia, increasing fiber including incorporating two kiwi fruit per day. Pending symptoms with these changes may move forward with low FODMAP diet.  She does note improvement in bloating with decreasing aerophagia with stopping using a straw, carbonated beverages, and gum chewing.     She has done a number of food sensitivity and hormone testing with [facility].  Notes that her progesterone is low and food sensitivities have come back positive for a number of different things.  Has been trying to identify different dietary triggers as well.     She notes that she is often thinking about her GI issues and feels that she cannot go out easily because she fears eating " something will cause more symptoms.  Impacting her quality of life.  She does identify stress and gut-brain interaction as potential contributor and and is interested in GI Health Psychology if no improvement in symptoms with diet/medications and work up negative.      ROS:    No fevers or chillshe  No weight loss  No blurry vision, double vision or change in vision  No sore throat  No lymphadenopathy  No headache, paraesthesias, or weakness in a limb  No shortness of breath or wheezing  No chest pain or pressure  No arthralgias or myalgias  No rashes or skin changes  No odynophagia or dysphagia  No BRBPR, hematochezia, melena  No dysuria, frequency or urgency  No hot/cold intolerance or polyria  No anxiety or depression      PERTINENT PAST MEDICAL HISTORY:  Hyperlipidemia    PREVIOUS SURGERIES:  None    PREVIOUS ENDOSCOPY:  5/2021 Colonoscopy (Dr. Banda)    FINAL DIAGNOSIS:   DESCENDING COLON, BIOPSY:   - Colonic mucosa with lymphoid aggregates   - No evidence of neoplastic polyp       ALLERGIES:     Allergies   Allergen Reactions     Cefaclor Rash     Pt states she had allergy as a infant (Rash)          PERTINENT MEDICATIONS:    Current Outpatient Medications:      Calcium 200 MG TABS, , Disp: , Rfl:      Multiple Vitamins-Minerals (MULTIVITAMIN WOMEN PO), , Disp: , Rfl:     SOCIAL HISTORY:  Single, never smoker, occasional EtOh,     FAMILY HISTORY:    Family History   Problem Relation Age of Onset     Bladder Cancer Maternal Grandfather      Prostate Cancer Maternal Grandfather      Lymphoma Paternal Grandmother      Cerebrovascular Disease Paternal Grandmother         only mini     Other Cancer Paternal Grandmother         NH lymphoma     Hypertension Mother      Hyperlipidemia Mother         only borderline to high     Hyperlipidemia Father         only borderline to high     Diabetes Maternal Grandmother      Cerebrovascular Disease Maternal Grandmother        PHYSICAL EXAMINATION:  Constitutional: aaox3,  cooperative, pleasant, not dyspneic/diaphoretic, no acute distress  Vitals reviewed: LMP 02/28/2023   Wt:   Wt Readings from Last 2 Encounters:   03/29/23 83.2 kg (183 lb 8 oz)   12/19/22 84 kg (185 lb 1.6 oz)      Eyes: Sclera anicteric/injected  Respiratory: Unlabored breathing  Skin: , no jaundice  Psych: Normal affect  MSK: Normal gait      PERTINENT STUDIES:  Most recent labs 2/5/21     WBC 7.0 3.2 - 11.0 10*9/L   RBC 4.53 3.77 - 5.24 10*12/L   HGB 13.0 11.2 - 15.5 g/dL   HCT 39.2 34.3 - 46.0 %   MCV 86.5 81.4 - 99.0 fL   MCH 28.7 26.7 - 33.1 pg   MCHC 33.2 31.6 - 35.5 g/dL   RDW 12.2 11.3 - 14.6 %    130 - 375 10*9/L   Neutrophils % 50.5 %   Lymphocytes % 36.2 %   Monocytes % 10.8 %   Eosinophils % 1.7 %   Basophils % 0.7 %   Immature Granulocytes % 0.1 %   Neutrophils Absolute 3.5 1.5 - 7.6 10*9/L   Lymphocytes Absolute 2.5 0.8 - 3.3 10*9/L   Monocytes Absolute 0.8 0.2 - 0.9 10*9/L   Eosinophils Absolute 0.1 0.0 - 0.4 10*9/L   Basophils Absolute 0.1 0.0 - 0.1 10*9/L   Immature Granulocytes Absolute 0.01 0.00 - 0.06 10*9/L       Component Value Ref Range   Sodium 141 134 - 143 mEq/L   Potassium 3.8 3.4 - 5.1 mEq/L   Chloride 105 99 - 110 mEq/L   Carbon Dioxide 26 19 - 29 mEq/L   Anion Gap 10.0 3.0 - 15.0 mEq/L   Blood Urea Nitrogen 10 5 - 24 mg/dL   Creatinine 0.78 0.40 - 1.00 mg/dL   Glomerular Filtration Rate  >60 mL/min/1.73 m*2   Calcium 9.2 8.4 - 10.5 mg/dL   Glucose 90 70 - 99 mg/dL   Protein, Total 7.0 6.0 - 8.0 g/dL   Albumin 3.9 3.5 - 5.0 g/dL   Alkaline Phosphatase 57 40 - 150 IU/L   Aspartate Aminotransferase 19 10 - 40 IU/L   Alanine Aminotransferase 15 6 - 31 IU/L   Bilirubin, Total 0.4 0.2 - 1.2 mg/dL

## 2023-04-10 NOTE — LETTER
4/10/2023         RE: Selina Jimenez  [address] [facility] [address]        Dear Colleague,    Thank you for referring your patient, Selina Jimenez, to the [facility] GASTROENTEROLOGY [facility]INIC [place]. Please see a copy of my visit note below.      GI CLINIC VISIT    REASON FOR CONSULTATION:   Gassiness, Hx of colon polyps      ASSESSMENT/PLAN:  33yo relatively healthy F here for follow up clinic visit.     #Bloating, flatus  #Irregular bowel pattern  #History of hemorrhoids  #Abdominal discomfort  Patient reports ongoing symptoms of gassiness and bloating, irregular bowel pattern with straining and incomplete evacuation and recurrence of hemorrhoids. Colonoscopy in 2021 with similar sx was unremarkable. She is following lactose free diet, and mainly gluten free diet without significant improvement in her sx. Her primary symptoms include bloating and increased flatus which maybe due to aerophagia (excess air swallowing), functional bloating, constipation/stool burden, dietary factors, celiac disease, and less likely bacterial overgrowth or gastroparesis (no risk factors for these).  We will start with x-ray to assess for stool burden to see if this may be contributing to her symptoms.  We will also do testing for celiac disease with serologies after reincorporating gluten into diet for at least 2 to 4 weeks.  She met with our dietitian Marta last week which she found helpful, has found some improvements with decreasing aerophagia-she should continue these lifestyle measures.  Additionally continue dietary fiber in addition to fiber supplement with Citrucel daily in hopes of regulating bowel pattern.  Encouraged adequate hydration and physical activity to assist with bowel movements.  Additionally given straining and incomplete evacuation discussed pelvic floor and use of squatty potty/stool to get knees above hips to relax pelvic floor and assist with ease of defecation.  If x-ray shows  stool burden consider magnesium for laxation as she did not previously tolerate MiraLAX.  Would start at 200 to 400 mg at nighttime.  Additionally discussed that she could use IBgard/enteric-coated peppermint to help with symptoms.  Not interested in Gas-X or Beano at this time.  Pending above work-up and symptoms Future considerations include SIBO breath testing, gastric emptying study, and referral to GI health psychology.     Recommendations:  - Can continue Hydrocortisone 2.5% cream (Anusol-HC) for the hemorrhoids  - Continue to meet with Marta Dugan dietitian regarding diet modifications  - Complete x-ray  to assess for for stool burden  - Complete labs for celiac testing (after eating gluten for at least 2-4 weeks - equivalent to 1 slice of bread daily)  - Continue Citrucel 1 Tbs per day, can increase up to 1.5-2 Tbs if needed  - Aim for at least 64 oz of water per day  - Continue daily physical activity, walking, running, yoga are great!  - Try squatty potty or stool to get knees above hips  - Try IB Diogenes (or generic enteric coated peppermint oil), can start with 1 capsule twice daily increasing up to 2 capsules three times per day per day depending on symptoms    RTC in 3 months    Thank you for this consultation.  It was a pleasure to participate in the care of this patient; please contact us with any further questions.  A total of 75minutes spent on the date of the encounter doing chart review, history and exam, documentation and further activities as noted above. This note was created with voice recognition software, and while reviewed for accuracy, typos may remain.       Molly Cortez PA-C  Division of Gastroenterology, Hepatology & Nutrition  [facility]      HPI  33 yo relatively healthy F here for follow up. She was seen in [facility] GI clinic 3/2021 for initial clinic visit with Dr. Banda.    At initial visit 3/2021 (history per Dr. Banda)  Selina reported gassiness and bloating and pain  with defecation and development of hemorrhoids. She started feeling bloated and gassy, especially at night April/May 2020. She made dietary changes and did weight watchers and lost 20 pounds and started feeling better. Last fall, school started up and she started developing gassiness and bloating again, with near daily symptoms. Every day within 30 minutes after eating a meal bloated and gassy (associated with smelliness). She thought that certain foods were triggering so she started. Her PCP recommended low LODMAP diet, after institution her symptoms started getting better.     Selina reported a BM every other day to every three days. Consistency depends on what she eats. BSFS 3-4. Currently though she is reporting some pain with defecation. No straining or pushing. No hard stools. She eats fruits and vegetables and has recently increased her fiber intake. She tried fiber supplements but was worried about using benefiber (which is a wheat dextran).She has tried to cut out wheat because this may be a trigger (though on the occasion when she did have some wheat, it was not detrimental. No hematochezia or melena but she did see a small amount of blood in the toilet in the fall. She still thinks that she has hemorrhoids. Pt states she has had colon polyps- benign per patient. She states that she had severe abdominal pain in the past which prompted the colonoscopy. She states that she was told to get colonoscopy every 5 years.    No family hx of any GI cancers. No weight loss. No prior endoscopies. Labs reviewed from 2/5/21    ----    After last visit she had ileocolonoscopy 5/2021 with Dr. Banda which was normal.    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  Occurs throughout the day but worse after eating.  Reports flatus seems excessive  "compared to her family and is foul-smelling, particularly at night.  Notes that sometimes she feels full and bloated with meals which impacts her ability to eat large amounts.  She also notes \"sour \"stomach in the morning and sometimes at night (night time sourness also associated with nausea without vomiting).  Started Prilosec for this with slight improvement.  No epigastric or substernal burning.  No acid brash.  No odynophagia or dysphagia.   Avoids lactose/dairy and generally gluten (about 75% of time per her report).    BM Pattern -would often alternate between loose stool and formed (Tulsa 6 and Tulsa 3-4).  Typically would have  bowel movement daily then skip a day or two without bowel movements then have 2-3 lstools per day.  Over past week since increasing dietary fiber and starting Citrucel stools have been more solid, typically daily Tulsa 3-4.  Does note straining and positional movements.  Endorses feeling of incomplete evacuation.  No digitation.  Notes hemorrhoid has recurred with irritation and able to feel on outside.  No bleeding.  Denies hematochezia or melena.  Reports primary care recommended MiraLAX to her in the past, she tried 1 capful daily for a month and found that this actually exacerbated her bloating and gas.    She recently met with Marta Dugan RD (last week). At visit they reviewed aerophagia, increasing fiber including incorporating two kiwi fruit per day. Pending symptoms with these changes may move forward with low FODMAP diet.  She does note improvement in bloating with decreasing aerophagia with stopping using a straw, carbonated beverages, and gum chewing.     She has done a number of food sensitivity and hormone testing with [place] [facility].  Notes that her progesterone is low and food sensitivities have come back positive for a number of different things.  Has been trying to identify different dietary triggers as well.     She notes that she is often thinking about her GI " issues and feels that she cannot go out easily because she fears eating something will cause more symptoms.  Impacting her quality of life.  She does identify stress and gut-brain interaction as potential contributor and and is interested in GI Health Psychology if no improvement in symptoms with diet/medications and work up negative.      ROS:    No fevers or chillshe  No weight loss  No blurry vision, double vision or change in vision  No sore throat  No lymphadenopathy  No headache, paraesthesias, or weakness in a limb  No shortness of breath or wheezing  No chest pain or pressure  No arthralgias or myalgias  No rashes or skin changes  No odynophagia or dysphagia  No BRBPR, hematochezia, melena  No dysuria, frequency or urgency  No hot/cold intolerance or polyria  No anxiety or depression      PERTINENT PAST MEDICAL HISTORY:  Hyperlipidemia    PREVIOUS SURGERIES:  None    PREVIOUS ENDOSCOPY:  5/2021 Colonoscopy (Dr. Banda)    FINAL DIAGNOSIS:   DESCENDING COLON, BIOPSY:   - Colonic mucosa with lymphoid aggregates   - No evidence of neoplastic polyp       ALLERGIES:     Allergies   Allergen Reactions    Cefaclor Rash     Pt states she had allergy as a infant (Rash)          PERTINENT MEDICATIONS:    Current Outpatient Medications:     Calcium 200 MG TABS, , Disp: , Rfl:     Multiple Vitamins-Minerals (MULTIVITAMIN WOMEN PO), , Disp: , Rfl:     SOCIAL HISTORY:  Single, never smoker, occasional EtOh,     FAMILY HISTORY:    Family History   Problem Relation Age of Onset    Bladder Cancer Maternal Grandfather     Prostate Cancer Maternal Grandfather     Lymphoma Paternal Grandmother     Cerebrovascular Disease Paternal Grandmother         only mini    Other Cancer Paternal Grandmother         NH lymphoma    Hypertension Mother     Hyperlipidemia Mother         only borderline to high    Hyperlipidemia Father         only borderline to high    Diabetes Maternal Grandmother     Cerebrovascular Disease Maternal  Grandmother        PHYSICAL EXAMINATION:  Constitutional: aaox3, cooperative, pleasant, not dyspneic/diaphoretic, no acute distress  Vitals reviewed: LMP 02/28/2023   Wt:   Wt Readings from Last 2 Encounters:   03/29/23 83.2 kg (183 lb 8 oz)   12/19/22 84 kg (185 lb 1.6 oz)      Eyes: Sclera anicteric/injected  Respiratory: Unlabored breathing  Skin: , no jaundice  Psych: Normal affect  MSK: Normal gait      PERTINENT STUDIES:  Most recent labs 2/5/21     WBC 7.0 3.2 - 11.0 10*9/L   RBC 4.53 3.77 - 5.24 10*12/L   HGB 13.0 11.2 - 15.5 g/dL   HCT 39.2 34.3 - 46.0 %   MCV 86.5 81.4 - 99.0 fL   MCH 28.7 26.7 - 33.1 pg   MCHC 33.2 31.6 - 35.5 g/dL   RDW 12.2 11.3 - 14.6 %    130 - 375 10*9/L   Neutrophils % 50.5 %   Lymphocytes % 36.2 %   Monocytes % 10.8 %   Eosinophils % 1.7 %   Basophils % 0.7 %   Immature Granulocytes % 0.1 %   Neutrophils Absolute 3.5 1.5 - 7.6 10*9/L   Lymphocytes Absolute 2.5 0.8 - 3.3 10*9/L   Monocytes Absolute 0.8 0.2 - 0.9 10*9/L   Eosinophils Absolute 0.1 0.0 - 0.4 10*9/L   Basophils Absolute 0.1 0.0 - 0.1 10*9/L   Immature Granulocytes Absolute 0.01 0.00 - 0.06 10*9/L       Component Value Ref Range   Sodium 141 134 - 143 mEq/L   Potassium 3.8 3.4 - 5.1 mEq/L   Chloride 105 99 - 110 mEq/L   Carbon Dioxide 26 19 - 29 mEq/L   Anion Gap 10.0 3.0 - 15.0 mEq/L   Blood Urea Nitrogen 10 5 - 24 mg/dL   Creatinine 0.78 0.40 - 1.00 mg/dL   Glomerular Filtration Rate  >60 mL/min/1.73 m*2   Calcium 9.2 8.4 - 10.5 mg/dL   Glucose 90 70 - 99 mg/dL   Protein, Total 7.0 6.0 - 8.0 g/dL   Albumin 3.9 3.5 - 5.0 g/dL   Alkaline Phosphatase 57 40 - 150 IU/L   Aspartate Aminotransferase 19 10 - 40 IU/L   Alanine Aminotransferase 15 6 - 31 IU/L   Bilirubin, Total 0.4 0.2 - 1.2 mg/dL       Again, thank you for allowing me to participate in the care of your patient.      Sincerely,    Molly Cortez PA-C

## 2023-04-12 ENCOUNTER — TELEPHONE (OUTPATIENT)
Dept: GASTROENTEROLOGY | Facility: CLINIC | Age: 33
End: 2023-04-12
Payer: COMMERCIAL

## 2023-04-12 NOTE — TELEPHONE ENCOUNTER
"Spoke with patient regarding GI clinic follow up. Patient was driving and stated she would look at her calendar and call or go [facility] later to schedule.    Will remove follow-up request due to patient will call to schedule    Schedule:  \"Return GI\" visit with Molly Cortez for 4 mo follow up (around 8/10/23)  "

## 2023-04-27 ENCOUNTER — HOSPITAL ENCOUNTER (OUTPATIENT)
Dept: GENERAL RADIOLOGY | Facility: CLINIC | Age: 33
Discharge: HOME | End: 2023-04-27
Admitting: DIETITIAN, REGISTERED
Payer: COMMERCIAL

## 2023-04-27 DIAGNOSIS — R14.0 BLOATING: ICD-10-CM

## 2023-04-27 PROCEDURE — 74018 RADEX ABDOMEN 1 VIEW: CPT

## 2023-05-08 ENCOUNTER — LAB (OUTPATIENT)
Dept: LAB | Facility: CLINIC | Age: 33
End: 2023-05-08
Payer: COMMERCIAL

## 2023-05-08 DIAGNOSIS — R14.0 BLOATING: ICD-10-CM

## 2023-05-08 PROCEDURE — 86364 TISS TRNSGLTMNASE EA IG CLAS: CPT

## 2023-05-08 PROCEDURE — 86258 DGP ANTIBODY EACH IG CLASS: CPT

## 2023-05-08 PROCEDURE — 36415 COLL VENOUS BLD VENIPUNCTURE: CPT

## 2023-05-08 PROCEDURE — 82784 ASSAY IGA/IGD/IGG/IGM EACH: CPT

## 2023-05-09 ENCOUNTER — MYC MEDICAL ADVICE (OUTPATIENT)
Dept: PEDIATRICS | Facility: CLINIC | Age: 33
End: 2023-05-09
Payer: COMMERCIAL

## 2023-05-09 ENCOUNTER — E-VISIT (OUTPATIENT)
Dept: PEDIATRICS | Facility: CLINIC | Age: 33
End: 2023-05-09
Payer: COMMERCIAL

## 2023-05-09 DIAGNOSIS — K12.0 APHTHOUS STOMATITIS: Primary | ICD-10-CM

## 2023-05-09 DIAGNOSIS — R07.0 THROAT PAIN: ICD-10-CM

## 2023-05-09 PROCEDURE — 99421 OL DIG E/M SVC 5-10 MIN: CPT | Performed by: NURSE PRACTITIONER

## 2023-05-10 ENCOUNTER — LAB (OUTPATIENT)
Dept: LAB | Facility: CLINIC | Age: 33
End: 2023-05-10
Attending: PHYSICIAN ASSISTANT
Payer: COMMERCIAL

## 2023-05-10 DIAGNOSIS — R07.0 THROAT PAIN: ICD-10-CM

## 2023-05-10 DIAGNOSIS — Z20.822 SUSPECTED COVID-19 VIRUS INFECTION: ICD-10-CM

## 2023-05-10 LAB
DEPRECATED S PYO AG THROAT QL EIA: NEGATIVE
GROUP A STREP BY PCR: NOT DETECTED
IGA SERPL-MCNC: 88 MG/DL (ref 84–499)

## 2023-05-10 PROCEDURE — 87635 SARS-COV-2 COVID-19 AMP PRB: CPT

## 2023-05-10 PROCEDURE — 87651 STREP A DNA AMP PROBE: CPT

## 2023-05-10 RX ORDER — LIDOCAINE HYDROCHLORIDE 20 MG/ML
15 SOLUTION OROPHARYNGEAL
Qty: 100 ML | Refills: 0 | Status: SHIPPED | OUTPATIENT
Start: 2023-05-10 | End: 2023-06-22

## 2023-05-11 LAB
GLIADIN IGA SER-ACNC: 0.8 U/ML
GLIADIN IGG SER-ACNC: <0.6 U/ML
SARS-COV-2 RNA RESP QL NAA+PROBE: NEGATIVE
TTG IGA SER-ACNC: <0.2 U/ML
TTG IGG SER-ACNC: 1 U/ML

## 2023-05-16 ENCOUNTER — VIRTUAL VISIT (OUTPATIENT)
Dept: GASTROENTEROLOGY | Facility: CLINIC | Age: 33
End: 2023-05-16
Payer: COMMERCIAL

## 2023-05-16 VITALS — WEIGHT: 185 LBS | BODY MASS INDEX: 29.03 KG/M2 | HEIGHT: 67 IN

## 2023-05-16 DIAGNOSIS — R14.3 FLATUS: ICD-10-CM

## 2023-05-16 DIAGNOSIS — R10.9 ABDOMINAL DISCOMFORT: ICD-10-CM

## 2023-05-16 DIAGNOSIS — R14.0 BLOATING: Primary | ICD-10-CM

## 2023-05-16 DIAGNOSIS — R19.8 IRREGULAR BOWEL HABITS: ICD-10-CM

## 2023-05-16 PROCEDURE — 97803 MED NUTRITION INDIV SUBSEQ: CPT | Mod: 95 | Performed by: DIETITIAN, REGISTERED

## 2023-05-16 PROCEDURE — 99207 PR NO CHARGE LOS: CPT | Mod: 95 | Performed by: DIETITIAN, REGISTERED

## 2023-05-16 ASSESSMENT — PAIN SCALES - GENERAL: PAINLEVEL: NO PAIN (0)

## 2023-05-16 NOTE — LETTER
5/16/2023         RE: eSlina Jimenez  [address] [facility] [address]        Dear Colleague,    Thank you for referring your patient, Selina Jimenez, to the [facility] GASTROENTEROLOGY CLINIC [place]. Please see a copy of my visit note below.      [facility] Outpatient Medical Nutrition Therapy      Time Spent: 59 minutes  Session Type:  Follow up  Referring Physician:  Mayra Francisco MD  Reason for RD Visit:   Abdominal bloating     Nutrition Assessment:  Patient is a 32 year old female with history that includes abdominal bloating and colonic polyps.    She stated that she has a long history of abdominal bloating, excessive gas, GERD but not having daily symptoms. She reported sometimes has constipation, sometimes solid stools, sometimes runny stools. Sometimes she will have a lot of stool then none for a day. Generally has a BM many days, but may go every other day. Stools are very soft to normal.  She stated that her PCP recommended taking miralax daily which she tried for a month starting in December 2022, but she had a lot more gas and felt very bloated while taking miralax. Awhile ago, she took benefiber but this was not tolerated, caused more symptoms. At times, she still feels bloated after going to the bathroom/after a BM. She has been trying to figure out what is causing her symptoms for many years on her own. She has not previously met with a GI provider. Had an appt with GI MD, but appt had to be cancelled. She was offered a visit with a PA but she wasn't sure if the PA was a gastroenterology PA. Explained at visit today that the PA would specialize in GI, so she was planning to call following visit to get rescheduled. Reviewed appts later in day after visit and she is now scheduled with Molly Cortez PA-C in GI on 4/10/23. She kept food and beverage symptom journals in anticipation of this visit. She hasn't been able to figure out any pattern or specific  foods triggering symptoms for her. She provider writer with copy of journal in my chart today as well. Based on journals, many days has some bloating, gas, pain. She follows a lactose free diet. Avoids all products containing milk except some butter. She also follows a mostly gluten free diet. In 2021, she followed a low fodmap diet but didn't noticed any change in symptoms. Over the past two weeks, she is trying to eat lower FODMAP but not following strictly. She stated that generally, she eats lower in fodmap anyway since she avoids lactose and gluten. She also drinks a lot of water 72-96 oz per day. She was having issues with sour stomach, heartburn but reported taking prilosec and this has improved. She eats 3 meals and 2-3 snacks per day. She drinks a sparkling beverage most days, sparkling water or occas diet coke. On weekends may have a hard seltzer.     At follow up today 5/16/23, she stated that a few weeks after her initial RD visit, she had an x-ray which showed moderate to large stool burden. Since our last/initial visit, she has been focusing on getting more fiber in her diet and tracking the fiber amounts. D/c'd straws, chewing gum, candies. Feels less bloated, not in uncomfortable pain at end of the day. Still having more frequent gas than others she knows but bloating is better. She has been tracking her fiber and mostly between 25-32 grams approximately. Eating 2 kiwi fruits daily. With less bloating, she is now noticing some of her other symptoms alittle more such as headache and fatigue though. Taking 2 T citrucel daily, just started Mg and has taken IBgard 3 times. Having 2 BMs per day, that are Person 3-4 and a lot of stools at those times. After lunch and supper more regularly, her face gets flushed and feels hot, really tired, almost like she has a cold. She has been eating some gluten once per day before her celiac lab tests.  She is uncertain if she ate gluten though at those times/meal  "where she has face flushed. She is still mostly gluten free diet or may have only one serving per day. She would now like to journal and track symptoms more regularly. Reported with eating higher fiber although her bloating is better, does feel like she is having more belching. She also c/o gaining weight. Since she is trying to get in more fiber, then some snacks may be more than previously were. Discussed some ideas today to adjust snacks and also decrease some intake if not hungry. See goals below with some ideas. She did not want to do a restrictive/elimination/low fodmap diet at this time. She stated that she generally does not eat a lot of the higher FoDMAP foods and prefers making some other adjustments to diet at this time.    Patient Active Problem List   Diagnosis    History of colonic polyps    Abdominal bloating     Height:   Ht Readings from Last 1 Encounters:   05/16/23 1.702 m (5' 7\")     Weight: Reported that her weight is increasing but feeling more fatigue and weight increasing.  Wt Readings from Last 10 Encounters:   05/16/23 83.9 kg (185 lb)   03/29/23 83.2 kg (183 lb 8 oz)   12/19/22 84 kg (185 lb 1.6 oz)   04/04/22 79.8 kg (176 lb)   02/14/22 79.8 kg (175 lb 14.4 oz)   03/02/21 77.6 kg (171 lb)        BMI: Estimated body mass index is 28.98 kg/m  as calculated from the following:    Height as of this encounter: 1.702 m (5' 7\").    Weight as of this encounter: 83.9 kg (185 lb).    Diet Recall:  (some usual/recent meals/snacks/beverages): during the week, works out in am then. Doing less snacking now.  Meal Food    Breakfast pc toast with 2 eggs or typically oatmeal with tejas seeds and berries (7 g fiber)/overnight oats or GF cereal   Lunch Salad spring mix, chicken, vegan dressing or GF pasta or Chicken with buffalo sauce   Dinner chicken/ground turkey, potato/GF pasta, veg occas steak or seafood- more potatoes and vegetables now with chicken/ground turkey/seafood occas steak.   Snacks AM snack " 1/2 c oats with tejas seeds and berries (7 g fiber) and 2 kiwis. In afternoon couple granola bites or if more hungry then has 1/2 c yogurt with ground flax and berries.    Beverages  oz Water, 1-2 cups coffee with oat milk creamer, occas mineral water   Alcohol Intake Socially on weekends: -1-2 mineral water/tonic mixed drinks on weekends     Frequency of eating/taking out meals: 1x/week.     Labs:    Last Comprehensive Metabolic Panel:  Sodium   Date Value Ref Range Status   02/14/2022 137 133 - 144 mmol/L Final     Potassium   Date Value Ref Range Status   02/14/2022 3.8 3.4 - 5.3 mmol/L Final     Chloride   Date Value Ref Range Status   02/14/2022 104 94 - 109 mmol/L Final     Carbon Dioxide (CO2)   Date Value Ref Range Status   02/14/2022 26 20 - 32 mmol/L Final     Anion Gap   Date Value Ref Range Status   02/14/2022 7 3 - 14 mmol/L Final     Glucose   Date Value Ref Range Status   02/14/2022 97 70 - 99 mg/dL Final     Urea Nitrogen   Date Value Ref Range Status   02/14/2022 16 7 - 30 mg/dL Final     Creatinine   Date Value Ref Range Status   02/14/2022 0.72 0.52 - 1.04 mg/dL Final     GFR Estimate   Date Value Ref Range Status   02/14/2022 >90 >60 mL/min/1.73m2 Final     Comment:     Effective December 21, 2021 eGFRcr in adults is calculated using the 2021 CKD-EPI creatinine equation which includes age and gender (Baldo bryant al., NEJ, DOI: 10.1056/QJKVvf9737328)     Calcium   Date Value Ref Range Status   02/14/2022 9.3 8.5 - 10.1 mg/dL Final     CBC RESULTS:   Recent Labs   Lab Test 02/14/22  1620   WBC 8.9   RBC 4.27   HGB 12.2   HCT 36.8   MCV 86   MCH 28.6   MCHC 33.2   RDW 12.2          Pertinent Medications/vitamin and mineral supplements:   Had taken a probiotic supplement gummy x 2 months but didn't noticed any improvement in symptoms so discontinued.   Current Outpatient Medications   Medication    Calcium 200 MG TABS    Multiple Vitamins-Minerals (MULTIVITAMIN WOMEN PO)    lidocaine,  viscous, (XYLOCAINE) 2 % solution     No current facility-administered medications for this visit.       Food Allergies:  NKFA   Physical Activity:  In the morning 3-5 times per week before work: walks or jog (20 minutes_+ and strength workouts and HIIT (1-2 times per week). On weekends active/chopping firewood and going for a walk    MALNUTRITION:  % Weight Loss:  None noted  % Intake:  No decreased intake noted  Subcutaneous Fat Loss:  None observed  Muscle Loss:  None observed  Fluid Retention:  None noted    Malnutrition Diagnosis: Patient does not meet two of the above criteria necessary for diagnosing malnutrition    Nutrition Prescription: nutrition education    Nutrition Intervention      Provided diet education for abdominal bloating, heartburn. Discussed some potential triggers and those that may be better tolerated. Discussed some specific tips and ideas (see below). Also reviewed low FODMAP diet process. At this time, she would prefer to start with some changes (1-4 below) versus trying a full low fodmap diet. She also plans to rescheduled with GI provider to discuss other testing/recommendations.    Patient verbalized understanding of education provided. See Goals below.     Goals:    Continue eating multiple smaller meals (smaller meals and snacks) such as 4-5 smaller meals per day that include good fiber sources.    --you can stop tracking the specific amount of daily fiber since you are familiar now with your usual foods and fiber amounts and focus on symptoms journals now.    Continue eating 2 kiwi fruits per day (either as a snack or along with a meal).    Do not overeat at a meal. Stop eating when you feel satisfied versus eating to the point of feeling full/too full.    Here are a few ideas we discussed:    --Can have a lighter morning snacks such as just 2 kiwis OR just the oatmeal and then a snack such as your soy yogurt, berries and ground flax in the afternoon (or your usual oatmeal in the  morning). Could have a smaller serving of your soy yogurt, berries and ground flax in the afternoon.    --Could have eggs and your usual oatmeal at breakfast and discontinue the toast.    Keep daily food and beverage journals and track any symptoms you experience. Can use an patricia such as My Symptoms Tracker or [employer] patricia for tracking.    Continue citrucel and other recommendations from Molly Cortez PA-C.    Continue drinking at least 64 oz or more water per day as you currently do.    Can speak with your primary care doctor or with Molly at your follow up about your interest in referral/meeting with our psychologist in our gastroenterology clinic, [facility].    Nutrition Monitoring and Evaluation: Will monitor adherence to nutrition recommendations at future RD visits.     Further Medical Nutrition Therapy:  Follow-up as needed, she stated that she feels comfortable with her current nutrition plan and ideas discussed so would like to f/up as needed.    Patient was encouraged to call/contact RD with any further questions.          Again, thank you for allowing me to participate in the care of your patient.      Sincerely,    Marta Dugan RD

## 2023-05-16 NOTE — NURSING NOTE
Is the patient currently in the state of [place]? YES    Visit mode:VIDEO    If the visit is dropped, the patient can be reconnected by: VIDEO VISIT: Send to e-mail at: [email or web address]    Will anyone else be joining the visit? NO      How would you like to obtain your AVS? MyChart    Are changes needed to the allergy or medication list? NO    Reason for visit: Video Visit

## 2023-05-16 NOTE — PROGRESS NOTES
Virtual Visit Details    Type of service:  Video Visit     Video start time: 9:29 AM    Video end time: 10:28 AM    Originating Location (pt. Location): Home    Distant Location (provider location):  On-site    Platform used for Video Visit: [facility] Outpatient Medical Nutrition Therapy      Time Spent: 59 minutes  Session Type:  Follow up  Referring Physician:  Mayra Francisco MD  Reason for RD Visit:   Abdominal bloating     Nutrition Assessment:  Patient is a 32 year old female with history that includes abdominal bloating and colonic polyps.    She stated that she has a long history of abdominal bloating, excessive gas, GERD but not having daily symptoms. She reported sometimes has constipation, sometimes solid stools, sometimes runny stools. Sometimes she will have a lot of stool then none for a day. Generally has a BM many days, but may go every other day. Stools are very soft to normal.  She stated that her PCP recommended taking miralax daily which she tried for a month starting in December 2022, but she had a lot more gas and felt very bloated while taking miralax. Awhile ago, she took benefiber but this was not tolerated, caused more symptoms. At times, she still feels bloated after going to the bathroom/after a BM. She has been trying to figure out what is causing her symptoms for many years on her own. She has not previously met with a GI provider. Had an appt with GI MD, but appt had to be cancelled. She was offered a visit with a PA but she wasn't sure if the PA was a gastroenterology PA. Explained at visit today that the PA would specialize in GI, so she was planning to call following visit to get rescheduled. Reviewed appts later in day after visit and she is now scheduled with Molly Cortez PA-C in GI on 4/10/23. She kept food and beverage symptom journals in anticipation of this visit. She hasn't been able to figure out any pattern or specific foods triggering symptoms  for her. She provider writer with copy of journal in my chart today as well. Based on journals, many days has some bloating, gas, pain. She follows a lactose free diet. Avoids all products containing milk except some butter. She also follows a mostly gluten free diet. In 2021, she followed a low fodmap diet but didn't noticed any change in symptoms. Over the past two weeks, she is trying to eat lower FODMAP but not following strictly. She stated that generally, she eats lower in fodmap anyway since she avoids lactose and gluten. She also drinks a lot of water 72-96 oz per day. She was having issues with sour stomach, heartburn but reported taking prilosec and this has improved. She eats 3 meals and 2-3 snacks per day. She drinks a sparkling beverage most days, sparkling water or occas diet coke. On weekends may have a hard seltzer.     At follow up today 5/16/23, she stated that a few weeks after her initial RD visit, she had an x-ray which showed moderate to large stool burden. Since our last/initial visit, she has been focusing on getting more fiber in her diet and tracking the fiber amounts. D/c'd straws, chewing gum, candies. Feels less bloated, not in uncomfortable pain at end of the day. Still having more frequent gas than others she knows but bloating is better. She has been tracking her fiber and mostly between 25-32 grams approximately. Eating 2 kiwi fruits daily. With less bloating, she is now noticing some of her other symptoms alittle more such as headache and fatigue though. Taking 2 T citrucel daily, just started Mg and has taken IBgard 3 times. Having 2 BMs per day, that are [place] 3-4 and a lot of stools at those times. After lunch and supper more regularly, her face gets flushed and feels hot, really tired, almost like she has a cold. She has been eating some gluten once per day before her celiac lab tests.  She is uncertain if she ate gluten though at those times/meal where she has face flushed.  "She is still mostly gluten free diet or may have only one serving per day. She would now like to journal and track symptoms more regularly. Reported with eating higher fiber although her bloating is better, does feel like she is having more belching. She also c/o gaining weight. Since she is trying to get in more fiber, then some snacks may be more than previously were. Discussed some ideas today to adjust snacks and also decrease some intake if not hungry. See goals below with some ideas. She did not want to do a restrictive/elimination/low fodmap diet at this time. She stated that she generally does not eat a lot of the higher FoDMAP foods and prefers making some other adjustments to diet at this time.    Patient Active Problem List   Diagnosis     History of colonic polyps     Abdominal bloating     Height:   Ht Readings from Last 1 Encounters:   05/16/23 1.702 m (5' 7\")     Weight: Reported that her weight is increasing but feeling more fatigue and weight increasing.  Wt Readings from Last 10 Encounters:   05/16/23 83.9 kg (185 lb)   03/29/23 83.2 kg (183 lb 8 oz)   12/19/22 84 kg (185 lb 1.6 oz)   04/04/22 79.8 kg (176 lb)   02/14/22 79.8 kg (175 lb 14.4 oz)   03/02/21 77.6 kg (171 lb)        BMI: Estimated body mass index is 28.98 kg/m  as calculated from the following:    Height as of this encounter: 1.702 m (5' 7\").    Weight as of this encounter: 83.9 kg (185 lb).    Diet Recall:  (some usual/recent meals/snacks/beverages): during the week, works out in am then. Doing less snacking now.  Meal Food    Breakfast pc toast with 2 eggs or typically oatmeal with tejas seeds and berries (7 g fiber)/overnight oats or GF cereal   Lunch Salad spring mix, chicken, vegan dressing or GF pasta or Chicken with buffalo sauce   Dinner chicken/ground turkey, potato/GF pasta, veg occas steak or seafood- more potatoes and vegetables now with chicken/ground turkey/seafood occas steak.   Snacks AM snack 1/2 c oats with tejas seeds " and berries (7 g fiber) and 2 kiwis. In afternoon couple granola bites or if more hungry then has 1/2 c yogurt with ground flax and berries.    Beverages  oz Water, 1-2 cups coffee with oat milk creamer, occas mineral water   Alcohol Intake Socially on weekends: -1-2 mineral water/tonic mixed drinks on weekends     Frequency of eating/taking out meals: 1x/week.     Labs:    Last Comprehensive Metabolic Panel:  Sodium   Date Value Ref Range Status   02/14/2022 137 133 - 144 mmol/L Final     Potassium   Date Value Ref Range Status   02/14/2022 3.8 3.4 - 5.3 mmol/L Final     Chloride   Date Value Ref Range Status   02/14/2022 104 94 - 109 mmol/L Final     Carbon Dioxide (CO2)   Date Value Ref Range Status   02/14/2022 26 20 - 32 mmol/L Final     Anion Gap   Date Value Ref Range Status   02/14/2022 7 3 - 14 mmol/L Final     Glucose   Date Value Ref Range Status   02/14/2022 97 70 - 99 mg/dL Final     Urea Nitrogen   Date Value Ref Range Status   02/14/2022 16 7 - 30 mg/dL Final     Creatinine   Date Value Ref Range Status   02/14/2022 0.72 0.52 - 1.04 mg/dL Final     GFR Estimate   Date Value Ref Range Status   02/14/2022 >90 >60 mL/min/1.73m2 Final     Comment:     Effective December 21, 2021 eGFRcr in adults is calculated using the 2021 CKD-EPI creatinine equation which includes age and gender (Baldo bryant al., [place], DOI: 10.1056/ITPYno7429238)     Calcium   Date Value Ref Range Status   02/14/2022 9.3 8.5 - 10.1 mg/dL Final     CBC RESULTS:   Recent Labs   Lab Test 02/14/22  1620   WBC 8.9   RBC 4.27   HGB 12.2   HCT 36.8   MCV 86   MCH 28.6   MCHC 33.2   RDW 12.2          Pertinent Medications/vitamin and mineral supplements:   Had taken a probiotic supplement gummy x 2 months but didn't noticed any improvement in symptoms so discontinued.   Current Outpatient Medications   Medication     Calcium 200 MG TABS     Multiple Vitamins-Minerals (MULTIVITAMIN WOMEN PO)     lidocaine, viscous, (XYLOCAINE) 2 %  solution     No current facility-administered medications for this visit.       Food Allergies:  NKFA   Physical Activity:  In the morning 3-5 times per week before work: walks or jog (20 minutes_+ and strength workouts and HIIT (1-2 times per week). On weekends active/chopping firewood and going for a walk    MALNUTRITION:  % Weight Loss:  None noted  % Intake:  No decreased intake noted  Subcutaneous Fat Loss:  None observed  Muscle Loss:  None observed  Fluid Retention:  None noted    Malnutrition Diagnosis: Patient does not meet two of the above criteria necessary for diagnosing malnutrition    Nutrition Prescription: nutrition education    Nutrition Intervention      Provided diet education for abdominal bloating, heartburn. Discussed some potential triggers and those that may be better tolerated. Discussed some specific tips and ideas (see below). Also reviewed low FODMAP diet process. At this time, she would prefer to start with some changes (1-4 below) versus trying a full low fodmap diet. She also plans to rescheduled with GI provider to discuss other testing/recommendations.    Patient verbalized understanding of education provided. See Goals below.     Goals:    1. Continue eating multiple smaller meals (smaller meals and snacks) such as 4-5 smaller meals per day that include good fiber sources.    --you can stop tracking the specific amount of daily fiber since you are familiar now with your usual foods and fiber amounts and focus on symptoms journals now.    2. Continue eating 2 kiwi fruits per day (either as a snack or along with a meal).    3. Do not overeat at a meal. Stop eating when you feel satisfied versus eating to the point of feeling full/too full.    Here are a few ideas we discussed:    --Can have a lighter morning snacks such as just 2 kiwis OR just the oatmeal and then a snack such as your soy yogurt, berries and ground flax in the afternoon (or your usual oatmeal in the morning). Could  have a smaller serving of your soy yogurt, berries and ground flax in the afternoon.    --Could have eggs and your usual oatmeal at breakfast and discontinue the toast.    4. Keep daily food and beverage journals and track any symptoms you experience. Can use an patricia such as My Symptoms Tracker or [employer] patricia for tracking.    5. Continue citrucel and other recommendations from Molly Cortez PA-C.    6. Continue drinking at least 64 oz or more water per day as you currently do.    7. Can speak with your primary care doctor or with Molly at your follow up about your interest in referral/meeting with our psychologist in our gastroenterology clinic, Sherice Warren.    Nutrition Monitoring and Evaluation: Will monitor adherence to nutrition recommendations at future RD visits.     Further Medical Nutrition Therapy:  Follow-up as needed, she stated that she feels comfortable with her current nutrition plan and ideas discussed so would like to f/up as needed.    Patient was encouraged to call/contact RD with any further questions.    Marta Dugan, MS, RD, LD

## 2023-05-23 ENCOUNTER — MYC MEDICAL ADVICE (OUTPATIENT)
Dept: PEDIATRICS | Facility: CLINIC | Age: 33
End: 2023-05-23
Payer: COMMERCIAL

## 2023-06-22 ENCOUNTER — VIRTUAL VISIT (OUTPATIENT)
Dept: PEDIATRICS | Facility: CLINIC | Age: 33
End: 2023-06-22
Payer: COMMERCIAL

## 2023-06-22 DIAGNOSIS — R14.0 ABDOMINAL BLOATING: Primary | ICD-10-CM

## 2023-06-22 PROCEDURE — 99215 OFFICE O/P EST HI 40 MIN: CPT | Mod: VID | Performed by: NURSE PRACTITIONER

## 2023-06-22 RX ORDER — IBUPROFEN 200 MG
CAPSULE ORAL DAILY
COMMUNITY
End: 2023-07-31

## 2023-06-22 NOTE — PROGRESS NOTES
Selina is a 32 year old who is being evaluated via a billable video visit.      How would you like to obtain your AVS? MyChart  If the video visit is dropped, the invitation should be resent by: Text to cell phone: [phone]  Will anyone else be joining your video visit? No          Assessment & Plan     Abdominal bloating  Recommend continuing to follow with GI, next follow-up appt in August. Will defer decision to pursue EGD to GI provider. Other work-up to consider in the future would include trans vag US and/or referral to OB/GYN as well as consideration of functional medicine consult. Patient is interested in meeting with GI psychologist - I have reached out to Sherice Warren, PhD and am waiting to hear back on referral process.         42 minutes spent by me on the date of the encounter doing chart review, patient visit and documentation          MEDICATIONS:  Continue current medications without change  CONSULTATION/REFERRAL to Psychology  FUTURE APPOINTMENTS:       - Follow-up with GI as scheduled.     MARIA D Vogel [facility] JARET    Toshia Marks is a 32 year old, presenting for the following health issues:  RECHECK         No data to display              HPI     Presents for follow-up.     Has been following with GI and nutritionist over the past 2-3 months for bloating and gassiness. Has been following nutritionists recommendations for low fodmap, high fiber diet while taking Citrucel and magnesium. XR KUB 4/27 showed stool burden. Increased Citrucel and increased bowel movements followed. Celiac tested, negative. Continuing to avoid gluten when she can but is not strict. At her last visit with nutritionist she was recommended to use an patricia to track IBS symptoms. Found the data to be contradicting and she hasn't found it helpful. Has GI follow-up appt scheduled in augus. No additional appts with nutrition at this time.     Still feels like something is not right.  "Feels like she has a rock in her gut. Almost feels like period cramps, heaviness to lower abdomen. Lethargy, headache often accompanies these symptoms. Bloating significantly improved with elimination of carbonated beverages, straw, gum chewing. Notes she will sometimes have \"sour stomach\" after eating with excessive burping. Prilosec does seem to help when she takes it.     GI had mentioned next steps being additional labs or EGD. Could also consider GI psychologist.     She did start seeing a therapist last week.     Patient Active Problem List   Diagnosis     History of colonic polyps     Abdominal bloating     Past Medical History:   Diagnosis Date     History of colonic polyps     dx age 12, multiple removed, benign     Current Outpatient Medications   Medication     Calcium 200 MG TABS     lidocaine, viscous, (XYLOCAINE) 2 % solution     Multiple Vitamins-Minerals (MULTIVITAMIN WOMEN PO)     No current facility-administered medications for this visit.        Allergies   Allergen Reactions     Cefaclor Rash     Pt states she had allergy as a infant (Rash)            Review of Systems    ROS: 10 point ROS neg other than the symptoms noted above in the HPI.        Objective       Vitals:  No vitals were obtained today due to virtual visit.    Physical Exam   GENERAL: Healthy, alert and no distress  EYES: Eyes grossly normal to inspection.  No discharge or erythema, or obvious scleral/conjunctival abnormalities.  RESP: No audible wheeze, cough, or visible cyanosis.  No visible retractions or increased work of breathing.    SKIN: Visible skin clear. No significant rash, abnormal pigmentation or lesions.  NEURO: Cranial nerves grossly intact.  Mentation and speech appropriate for age.  PSYCH: Mentation appears normal, affect normal/bright, judgement and insight intact, normal speech and appearance well-groomed.          Video-Visit Details    Type of service:  Video Visit     Originating Location (pt. Location): " Home    Distant Location (provider location):  Off-site  Platform used for Video Visit: Dedra

## 2023-06-22 NOTE — PATIENT INSTRUCTIONS
Functional Medicine Clinics to consider:    [facility]  [email or web address]    [facility]  [email or web address]    [facility]  [email or web address]_content=web-url&utm_[email or web address]_[email or web address]-and-Healing-[facility]

## 2023-06-29 ENCOUNTER — MYC MEDICAL ADVICE (OUTPATIENT)
Dept: PEDIATRICS | Facility: CLINIC | Age: 33
End: 2023-06-29
Payer: COMMERCIAL

## 2023-06-29 ENCOUNTER — NURSE TRIAGE (OUTPATIENT)
Dept: PEDIATRICS | Facility: CLINIC | Age: 33
End: 2023-06-29
Payer: COMMERCIAL

## 2023-06-29 NOTE — TELEPHONE ENCOUNTER
Per routing comment:   Leslie Hutton, MARIA D CNP  You 9 minutes ago (3:56 PM)     HB  I think UC is reasonable given her lightheadedness with the diarrhea.       RN called and spoke with patient. RN relayed provider recommendation above. Patient states she is pre-occupied at the moment and has some personal stuff to deal with. Patient states she will go to UC tomorrow if she still has symptoms.     Carmelo SANDOVAL RN 6/29/2023 at 4:08 PM

## 2023-06-29 NOTE — TELEPHONE ENCOUNTER
"Nurse Triage SBAR    Is this a 2nd Level Triage? YES, LICENSED PRACTITIONER REVIEW IS REQUIRED    S-(situation): RN called to triage MyC received 6/29/23. Patient experiencing diarrhea for past 2 days.     B-(background): Patient was seen for VV 6/22/23 r/t abdominal bloating.     A-(assessment): Patient experiencing watery BMs 4x daily for past 2 days. Patient notes intermittent abdominal cramping, rated as 4/10. Patient felt \"woozy\" a few times yesterday and today but states she is drinking plenty of fluids. No vomiting. No fever. No blood in stool. Patient states she will try to eat something and then experience loud gurgling in stomach and feel lower abdominal cramping.     Protocol Recommended Disposition:   Go To ED/UCC Now (Or To Office With PCP Approval)    R-(recommendations): Second level triage requested. Please review and advise.     Routed to provider    Does the patient meet one of the following criteria for ADS visit consideration? 16+ years old, with an [facility] PCP     TIP  Providers, please consider if this condition is appropriate for management at one of our Acute and Diagnostic Services sites.     If patient is a good candidate, please use dotphrase <dot>triageresponse and select Refer to ADS to document.          Reason for Disposition    Constant abdominal pain lasting > 2 hours    Additional Information    Negative: SEVERE diarrhea (e.g., 7 or more times / day more than normal) and age > 60 years    Negative: SEVERE abdominal pain (e.g., excruciating) and present > 1 hour    Negative: SEVERE abdominal pain and age > 60 years    Negative: Bloody, black, or tarry bowel movements (Exception: chronic-unchanged black-grey bowel movements and is taking iron pills or Pepto-Bismol)    Negative: Vomiting also present and worse than the diarrhea    Negative: Blood in stool and without diarrhea    Negative: Shock suspected (e.g., cold/pale/clammy skin, too weak to stand, low BP, rapid pulse)    Negative: " "Difficult to awaken or acting confused (e.g., disoriented, slurred speech)    Negative: Sounds like a life-threatening emergency to the triager    Answer Assessment - Initial Assessment Questions  1. DIARRHEA SEVERITY: \"How bad is the diarrhea?\" \"How many more stools have you had in the past 24 hours than normal?\"     - NO DIARRHEA (SCALE 0)    - MILD (SCALE 1-3): Few loose or mushy BMs; increase of 1-3 stools over normal daily number of stools; mild increase in ostomy output.    -  MODERATE (SCALE 4-7): Increase of 4-6 stools daily over normal; moderate increase in ostomy output.  * SEVERE (SCALE 8-10; OR 'WORST POSSIBLE'): Increase of 7 or more stools daily over normal; moderate increase in ostomy output; incontinence.      4x each in past 2 days.   2. ONSET: \"When did the diarrhea begin?\"       2 days ago.   3. BM CONSISTENCY: \"How loose or watery is the diarrhea?\"       Watery with some small pieces.   4. VOMITING: \"Are you also vomiting?\" If Yes, ask: \"How many times in the past 24 hours?\"       No vomiting.   5. ABDOMINAL PAIN: \"Are you having any abdominal pain?\" If Yes, ask: \"What does it feel like?\" (e.g., crampy, dull, intermittent, constant)       crampy abdominal pain. Comes and goes.   6. ABDOMINAL PAIN SEVERITY: If present, ask: \"How bad is the pain?\"  (e.g., Scale 1-10; mild, moderate, or severe)    - MILD (1-3): doesn't interfere with normal activities, abdomen soft and not tender to touch     - MODERATE (4-7): interferes with normal activities or awakens from sleep, abdomen tender to touch     - SEVERE (8-10): excruciating pain, doubled over, unable to do any normal activities        Current pain level at 4/10.   7. ORAL INTAKE: If vomiting, \"Have you been able to drink liquids?\" \"How much liquids have you had in the past 24 hours?\"      Able to drink plenty of fluids.   8. HYDRATION: \"Any signs of dehydration?\" (e.g., dry mouth [not just dry lips], too weak to stand, dizziness, new weight loss) " "\"When did you last urinate?\"      Patient had some wooziness yesterday and today.   9. EXPOSURE: \"Have you traveled to a foreign country recently?\" \"Have you been exposed to anyone with diarrhea?\" \"Could you have eaten any food that was spoiled?\"      No   10. ANTIBIOTIC USE: \"Are you taking antibiotics now or have you taken antibiotics in the past 2 months?\"        No   11. OTHER SYMPTOMS: \"Do you have any other symptoms?\" (e.g., fever, blood in stool)        No fever. No blood in stool.   12. PREGNANCY: \"Is there any chance you are pregnant?\" \"When was your last menstrual period?\"        no    Protocols used: CEDRIC-A-RADHA SANDOVAL RN 6/29/2023 at 3:38 PM    "

## 2023-07-05 ENCOUNTER — MYC MEDICAL ADVICE (OUTPATIENT)
Dept: PEDIATRICS | Facility: CLINIC | Age: 33
End: 2023-07-05
Payer: COMMERCIAL

## 2023-07-05 DIAGNOSIS — R14.0 ABDOMINAL BLOATING: Primary | ICD-10-CM

## 2023-07-18 ENCOUNTER — TELEPHONE (OUTPATIENT)
Dept: GASTROENTEROLOGY | Facility: CLINIC | Age: 33
End: 2023-07-18
Payer: COMMERCIAL

## 2023-07-18 NOTE — TELEPHONE ENCOUNTER
LVM with L pod number. Schedule New Appt with Dr. Sherice Warren, next available virtual appt.  Sent [email or web address].

## 2023-07-23 ENCOUNTER — E-VISIT (OUTPATIENT)
Dept: PEDIATRICS | Facility: CLINIC | Age: 33
End: 2023-07-23
Payer: COMMERCIAL

## 2023-07-23 DIAGNOSIS — R41.840 ATTENTION DEFICIT: Primary | ICD-10-CM

## 2023-07-23 PROCEDURE — 99421 OL DIG E/M SVC 5-10 MIN: CPT | Mod: 93 | Performed by: NURSE PRACTITIONER

## 2023-07-23 ASSESSMENT — ANXIETY QUESTIONNAIRES
1. FEELING NERVOUS, ANXIOUS, OR ON EDGE: NEARLY EVERY DAY
3. WORRYING TOO MUCH ABOUT DIFFERENT THINGS: MORE THAN HALF THE DAYS
4. TROUBLE RELAXING: NEARLY EVERY DAY
GAD7 TOTAL SCORE: 11
GAD7 TOTAL SCORE: 11
7. FEELING AFRAID AS IF SOMETHING AWFUL MIGHT HAPPEN: NOT AT ALL
2. NOT BEING ABLE TO STOP OR CONTROL WORRYING: MORE THAN HALF THE DAYS
5. BEING SO RESTLESS THAT IT IS HARD TO SIT STILL: SEVERAL DAYS
6. BECOMING EASILY ANNOYED OR IRRITABLE: NOT AT ALL

## 2023-07-23 ASSESSMENT — PATIENT HEALTH QUESTIONNAIRE - PHQ9
10. IF YOU CHECKED OFF ANY PROBLEMS, HOW DIFFICULT HAVE THESE PROBLEMS MADE IT FOR YOU TO DO YOUR WORK, TAKE CARE OF THINGS AT HOME, OR GET ALONG WITH OTHER PEOPLE: NOT DIFFICULT AT ALL
SUM OF ALL RESPONSES TO PHQ QUESTIONS 1-9: 4
SUM OF ALL RESPONSES TO PHQ QUESTIONS 1-9: 4

## 2023-07-24 ASSESSMENT — PATIENT HEALTH QUESTIONNAIRE - PHQ9: SUM OF ALL RESPONSES TO PHQ QUESTIONS 1-9: 4

## 2023-07-24 ASSESSMENT — ANXIETY QUESTIONNAIRES: GAD7 TOTAL SCORE: 11

## 2023-07-31 ENCOUNTER — OFFICE VISIT (OUTPATIENT)
Dept: MIDWIFE SERVICES | Facility: CLINIC | Age: 33
End: 2023-07-31
Payer: COMMERCIAL

## 2023-07-31 VITALS — WEIGHT: 182 LBS | BODY MASS INDEX: 28.51 KG/M2 | DIASTOLIC BLOOD PRESSURE: 64 MMHG | SYSTOLIC BLOOD PRESSURE: 116 MMHG

## 2023-07-31 DIAGNOSIS — R10.2 PELVIC PAIN IN FEMALE: Primary | ICD-10-CM

## 2023-07-31 LAB
ALBUMIN UR-MCNC: NEGATIVE MG/DL
APPEARANCE UR: CLEAR
BACTERIA #/AREA URNS HPF: ABNORMAL /HPF
BILIRUB UR QL STRIP: NEGATIVE
CLUE CELLS: ABNORMAL
COLOR UR AUTO: ABNORMAL
GLUCOSE UR STRIP-MCNC: NEGATIVE MG/DL
HGB UR QL STRIP: NEGATIVE
KETONES UR STRIP-MCNC: NEGATIVE MG/DL
LEUKOCYTE ESTERASE UR QL STRIP: ABNORMAL
MUCOUS THREADS #/AREA URNS LPF: PRESENT /LPF
NITRATE UR QL: NEGATIVE
PH UR STRIP: 7 [PH] (ref 5–7)
RBC #/AREA URNS AUTO: ABNORMAL /HPF
SP GR UR STRIP: 1.01 (ref 1–1.03)
SQUAMOUS #/AREA URNS AUTO: ABNORMAL /LPF
TRICHOMONAS, WET PREP: ABNORMAL
UROBILINOGEN UR STRIP-ACNC: 0.2 E.U./DL
WBC #/AREA URNS AUTO: ABNORMAL /HPF
WBC'S/HIGH POWER FIELD, WET PREP: ABNORMAL
YEAST, WET PREP: ABNORMAL

## 2023-07-31 PROCEDURE — 87591 N.GONORRHOEAE DNA AMP PROB: CPT | Performed by: ADVANCED PRACTICE MIDWIFE

## 2023-07-31 PROCEDURE — 87086 URINE CULTURE/COLONY COUNT: CPT | Performed by: ADVANCED PRACTICE MIDWIFE

## 2023-07-31 PROCEDURE — 81001 URINALYSIS AUTO W/SCOPE: CPT | Performed by: ADVANCED PRACTICE MIDWIFE

## 2023-07-31 PROCEDURE — 99203 OFFICE O/P NEW LOW 30 MIN: CPT | Performed by: ADVANCED PRACTICE MIDWIFE

## 2023-07-31 PROCEDURE — 87491 CHLMYD TRACH DNA AMP PROBE: CPT | Performed by: ADVANCED PRACTICE MIDWIFE

## 2023-07-31 PROCEDURE — 87210 SMEAR WET MOUNT SALINE/INK: CPT | Performed by: ADVANCED PRACTICE MIDWIFE

## 2023-07-31 NOTE — PROGRESS NOTES
"  SUBJECTIVE:                                                   Selina Jimenez is a 32 year old who presents to clinic today for the following health issue(s):  Patient presents with:  Physical: Gyn - has lots of bloating and wants to check in to make sure nothing else is wrong.      HPI:  Selina presents for intermittent pelvic discomfort. She has not noted any pattern to the pain and bloating. It can occur on the right side, or the left side, or just above her pubic bone. It lasts for \" a while\" and then subsides. She started noting the pain 09/2022, but feels that it has become worse in the past few months. She stopped taking COCs 04/2022. Patient is not symptomatic today.    Patient's last menstrual period was 07/24/2023 (exact date).  Menstrual History: frequency: every 27-28 days  Patient is sexually active  No obstetric history on file..  Using condoms and periodic abstinence for contraception.   Health maintenance updated:  yes  STI infx testing offered:  Accepted    Last PHQ-9 score on record =       7/23/2023     3:58 PM   PHQ-9 SCORE   PHQ-9 Total Score MyChart 4 (Minimal depression)   PHQ-9 Total Score 4     Last GAD7 score on record =       7/23/2023     3:58 PM   POLA-7 SCORE   Total Score 11 (moderate anxiety)   Total Score 11         Problem list and histories reviewed & adjusted, as indicated.  Additional history: as documented.    Patient Active Problem List   Diagnosis    History of colonic polyps    Abdominal bloating     Past Surgical History:   Procedure Laterality Date    TONSILLECTOMY & ADENOIDECTOMY      childhood      Social History     Tobacco Use    Smoking status: Never    Smokeless tobacco: Never   Substance Use Topics    Alcohol use: Yes     Comment: weekends, socially      Problem (# of Occurrences) Relation (Name,Age of Onset)    Diabetes (1) Maternal Grandmother (Lisa)    Hypertension (1) Mother (Yenny)    Cerebrovascular Disease (2) Paternal Grandmother (Renetta): only mini, " Maternal Grandmother (Lisa)    Prostate Cancer (1) Maternal Grandfather (Eric)    Other Cancer (1) Paternal Grandmother (Renetta): NH lymphoma    Hyperlipidemia (2) Mother (Yenny): only borderline to high, Father (Miguel): only borderline to high    Bladder Cancer (1) Maternal Grandfather (Eric)    Lymphoma (1) Paternal Grandmother (Renetta)              Current Outpatient Medications   Medication Sig    MAGNESIUM CITRATE PO     Multiple Vitamins-Minerals (MULTIVITAMIN WOMEN PO)      No current facility-administered medications for this visit.     Allergies   Allergen Reactions    Cefaclor Rash     Pt states she had allergy as a infant (Rash)          ROS:  CONSTITUTIONAL: NEGATIVE for fever, chills, change in weight  BREAST: NEGATIVE for masses, tenderness or discharge  GI: NEGATIVE for nausea, abdominal pain, heartburn, or change in bowel habits  : NEGATIVE for unusual urinary or vaginal symptoms. Periods are regular.  POSITIVE for intermittent and varied pelvic pain and bloating.  NEURO: NEGATIVE for weakness, dizziness or paresthesias  HEME/ALLERGY/IMMUNE: NEGATIVE for bleeding problems  PSYCHIATRIC: NEGATIVE for changes in mood or affect    OBJECTIVE:     /64   Wt 82.6 kg (182 lb)   LMP 07/24/2023 (Exact Date)   BMI 28.51 kg/m    Body mass index is 28.51 kg/m .    PHYSICAL EXAM:  Constitutional:  Appearance: Well nourished, well developed alert, in no acute distress  Chest:  Respiratory Effort:  Breathing unlabored.   Neurologic:  Mental Status:  Oriented X3.  Normal strength and tone, sensory exam grossly normal, mentation intact and speech normal.    Psychiatric:  Mentation appears normal and affect normal/bright.     Pelvic Exam:  Vulva: No external lesions, normal hair distribution, no adenopathy  Vagina: Moist, pink, no abnormal discharge, well rugated, no lesions, swab collected for GC/Chlam and wet prep  Cervix: smooth, pink, no  lesions, no cervical motion tenderness  Uterus: Normal size,  anteverted, non-tender, mobile  Ovaries: No mass, non-tender, mobile  Rectal exam: Deferred    In-Clinic Test Results:  No results found for this or any previous visit (from the past 24 hour(s)).    ASSESSMENT/PLAN:                                                        ICD-10-CM    1. Pelvic pain in female  R10.2 UA with Microscopic reflex to Culture - Clinic Collect     NEISSERIA GONORRHOEA PCR     CHLAMYDIA TRACHOMATIS PCR     Wet prep - Clinic Collect     US Pelvic Complete with Transvaginal          PLAN:    Labs and ultrasound pending. Based on symptoms and timing, probably orestes. Will advise patient of results when available. Encouraged patient to call with any questions or concerns.        MARIA D Robbins, CNM    No

## 2023-08-02 LAB
C TRACH DNA SPEC QL NAA+PROBE: NEGATIVE
N GONORRHOEA DNA SPEC QL NAA+PROBE: NEGATIVE

## 2023-08-03 LAB — BACTERIA UR CULT: NO GROWTH

## 2023-08-10 ENCOUNTER — ANCILLARY PROCEDURE (OUTPATIENT)
Dept: ULTRASOUND IMAGING | Facility: CLINIC | Age: 33
End: 2023-08-10
Attending: ADVANCED PRACTICE MIDWIFE
Payer: COMMERCIAL

## 2023-08-10 DIAGNOSIS — R10.2 PELVIC PAIN IN FEMALE: ICD-10-CM

## 2023-08-10 PROCEDURE — 76830 TRANSVAGINAL US NON-OB: CPT | Performed by: OBSTETRICS & GYNECOLOGY

## 2023-08-10 PROCEDURE — 76856 US EXAM PELVIC COMPLETE: CPT | Performed by: OBSTETRICS & GYNECOLOGY

## 2023-08-11 ENCOUNTER — VIRTUAL VISIT (OUTPATIENT)
Dept: GASTROENTEROLOGY | Facility: CLINIC | Age: 33
End: 2023-08-11
Payer: COMMERCIAL

## 2023-08-11 ENCOUNTER — TELEPHONE (OUTPATIENT)
Dept: MIDWIFE SERVICES | Facility: CLINIC | Age: 33
End: 2023-08-11
Payer: COMMERCIAL

## 2023-08-11 DIAGNOSIS — F43.22 ADJUSTMENT DISORDER WITH ANXIOUS MOOD: Primary | ICD-10-CM

## 2023-08-11 DIAGNOSIS — N83.202 LEFT OVARIAN CYST: Primary | ICD-10-CM

## 2023-08-11 PROCEDURE — 90791 PSYCH DIAGNOSTIC EVALUATION: CPT | Mod: VID | Performed by: PSYCHOLOGIST

## 2023-08-11 NOTE — TELEPHONE ENCOUNTER
Called patient and discussed results of pelvic ultrasound and recommendation for follow up ultrasound in 6-8 weeks. Patient will call and schedule appointment for follow up ultrasound. Encouraged patient to call with any questions or concerns.      MARIA D Robbins, ASIMM

## 2023-08-11 NOTE — LETTER
8/11/2023         RE: Selina Jimenez  [id number] [facility] [address]        Dear Colleague,    Thank you for referring your patient, Selina Jimenez, to the [facility] [place]. Please see a copy of my visit note below.    This telehealth service is appropriate and effective for delivering services in light of the necessity for social distancing to mitigate the COVID-19 epidemic and for conservation of PPE.     Patient has agreed to receiving telehealth services after being informed about it: Yes    Patient prefers video invitation/information to be sent by:   email    Time service started: 1:02 PM   Time service ended: 1:58 PM    Mode of transmission: Provender    Location of originating:  Home of the patient    Distance site:  Home office of provider for [facility]    The patient has been notified that:  Video visits will be conducted via a call with their psychologist to provide the care they need with a video conversation. Video visits may be billed at different rates depending on insurance coverage.  Patients are advised to please contact their insurance provider with any questions about their health insurance coverage. If during the course of a call the psychologist feels a video visit is not appropriate, patients will not be charged for this service.        Confidential Summary of Standard Psychodiagnostic Evaluation*    Referral Source:  Leslie Hutton APRN, CNP    Reason for Referral:  Evaluation of role of brain-gut dysfunction and psychosocial factors that impact GI condition and provide treatment as indicated    Sources of Information:  Information was obtained from a clinical interview with the patient, review of available medical records, and administration of psychological assessments.     Informed Consent:  Informed consent included a review of the nature and purpose of the assessment, billing, and confidentiality and limits thereof. Discussed role of  "GI psychologist in multidisciplinary GI care team in and documentation of visit in EMR.The patient expressed understanding, provided verbal consent, and agreed to proceed.    History of Presenting Concerns:  Selina Jimenez is a 32 year old female who presents with a history of chronic digestive concerns that include abdominal bloating, excessive gas, and abdominal pain.  She also presents with a history of GERD with no daily symptoms.  Chart review indicates some fluctuation between constipation and runny stools, yet her main concern today is the pain associated with bloating and gas.  She describes her digestive health concerns to include feeling similar to \"gut rot\" or having a \"sour stomach.\"  She described the bloating sensation to sometimes feel as if there is pressure in her abdominal cavity.  She also describes feeling sensations in her lower abdominal region similar to period cramps, and describes the sensations as a squeezing viselike sensation.  She describes having a long history of bloating and gas, currently this presents approximately 3 to 4 days/week or half of the time.  Despite this long history, symptoms first became noticeably problematic for her in 2022.  In fall 2022, she began consistently experiencing gas and bloating, that was bothersome even when she was at rest.  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  Of note, she describes herself as a person who tends to approach stressors and problems from a critical/analytic problem solving stance, which does not often help her cope more effectively " with chronic digestive symptoms; rather, she describes increasing stress as she feels she is not able to control her digestive symptoms effectively.    Currently, she described her pain to typically be a 6 out of 10, manageable but annoying.  Pain is better in the morning and worsens over the course of the day, with it worsening after lunch and into the evenings.  She describes no clear trigger to worsening pain but does acknowledge a link between increased pain and fried foods and carbohydrate intake.    She has began to work with medical nutrition therapy with Marta Dugan, MS, RD, LD; she is found eating more frequent smaller meals, and eating in response to hunger to be helpful.  Regarding eating, she does acknowledge anxiety about eating, often blaming certain foods for symptom experiences and responding with the believes that she should not have eaten those foods.  She does acknowledge feeling as if she has an experience of a downward spiral related to symptoms, where she notices and feels sensations, worries believes there is a problem about the sensation itself, and engages in problem solving efforts that do not relieve symptoms, and thus increases stress.    She has previously had a work-up with GI that included a colonoscopy and laboratory testing that found no positive findings for inflammation or structural causes for symptoms.      Medical History:    Past Medical History:   Diagnosis Date    History of colonic polyps     dx age 12, multiple removed, benign       Past Surgical History:   Procedure Laterality Date    TONSILLECTOMY & ADENOIDECTOMY      childhood       Current Outpatient Medications   Medication    MAGNESIUM CITRATE PO    Multiple Vitamins-Minerals (MULTIVITAMIN WOMEN PO)     No current facility-administered medications for this visit.       Patient Care Team:  Leslie Hutton APRN CNP as PCP - General (Family Medicine)  Kathi Banda MD as MD (Gastroenterology)  Mayra Francisco  MD Abby as Fellow (Gastroenterology)  Marta Dugan RD as Registered Dietitian (Dietitian, Registered)  Leslie Hutton APRN CNP as Assigned PCP       Psychiatric History:  Current symptoms: Moderate levels of anxiety which present with worry and physiologic manifestations, minimal symptoms of depression  Outpatient treatment: She acknowledged engaging in psychotherapy associated with an employee assistance program in fall 2021 due to stress related to her job.  Psychotropic medication use: No current use  Inpatient treatment: No history of inpatient psychiatric treatment or suicidal ideation, plan or intent  Family history: Acknowledges a history of anxiety in her mother    Substance Use History:  No current or past alcohol or drug misuse    Social History:  She is currently working full-time.  Julius with anxiety using the following strategies: Spending time outside, creating and following a to do list, using essential oils, or strict movement via stretching.  However she does acknowledge a history of coping with stress via pushing things down.  She grew up as eldest of 3 daughters, describing herself as a high performer academically and somebody who tends to put high self-imposed pressures on oneself.  She acknowledged it is hard to relax.  Has a bachelor's in math education is her highest level of education.    Psychological Assessment:  General Psychosocial Functioning  The patient completed the following battery of assessments during this psychological evaluation: World Health Organization Disability Assessment Schedule 2.0 12-item (WHODAS), Patient Health Questionnaire-9 (PHQ-9), Generalized Anxiety Disorder-7 screener (POLA-7), and the CAGE Questionnaire Adapted to Include Drugs (CAGE-AID).    The WHODAS measures disability and functional impairment due to health conditions including diseases, illnesses, injuries, mental or emotional problems, and problems with alcohol or drugs. The possible range of  scores is 12-60 and higher scores indicate higher levels of disability.         8/17/2023     8:25 PM   WHODAS 2.0 Total Score   Total Score 17   Total Score MyChart 17         The PHQ-9 is an instrument for screening, diagnosing, monitoring and measuring the severity of depression. Scores of 5, 10, 15, and 20 represent cutpoints for mild, moderate, moderately severe and severe depression, respectively.         7/23/2023     3:58 PM 8/17/2023     8:20 PM   PHQ   PHQ-9 Total Score 4 5   Q9: Thoughts of better off dead/self-harm past 2 weeks Not at all Not at all          The POLA-7 is an instrument for screening, diagnosing, monitoring and measuring the severity of anxiety. Scores of 5, 10, and 15 represent cutpoints for mild, moderate, and severe anxiety, respectively.          7/23/2023     3:58 PM 8/17/2023     8:22 PM   POLA-7 SCORE   Total Score 11 (moderate anxiety) 11 (moderate anxiety)   Total Score 11 11          The CAGE-AID questionnaire is used to screen for alcohol or drug abuse and dependence in adults. A CAGE-AID score  > 1 is a positive screen, suggesting further discussion is needed to determine if evaluation for alcohol or substance abuse is appropriate. A score > 2 is considered clinically significant, suggesting further evaluation of alcohol or substance-related problems is indicated.        8/17/2023     8:26 PM   CAGE-AID Total Score   Total Score 1   Total Score MyChart 1 (A total score of 2 or greater is considered clinically significant)     Pain Catastrophizing Scale      8/17/2023     8:29 PM   Pain Catastrophizing Scale   I worry all the time about whether the pain will end 1 = To a slight degree   I feel I can't go on 0 = Not at all   It's terrible and I think it's never going to get any better 2 = To a moderate degree   It's awful and I feel that it overwhelms me 4 = All the time   I feel I can't stand it anymore 1 = To a slight degree   I become afraid that the pain will get worse 2 = To  a moderate degree   I keep thinking of other painful events 2 = To a moderate degree   I anxiously want the pain to go away 4 = All the time   I can't seem to keep it out of my mind 4 = All the time   I keep thinking about how much it hurts 1 = To a slight degree   I keep thinking about how badly I want the pain to stop 3 = To a great degree   There's nothing I can do to reduce the intensity of the pain 1 = To a slight degree   I wonder whether something serious may happen 1 = To a slight degree   Total Pain Scale Scoring Question 26       Mental Status Examination:  Appearance/Behavior/Orientation: Patient was on time, appropriately groomed and dressed, and demonstrated good eye contact. Alert and oriented to person, place, time, and situation. No evidence of psychomotor agitation.     Cooperation/Reliability: Patient was open and cooperative throughout the session.    Speech/Language: Speech was clear, coherent, and of normal rate, rhythm and volume.   Thought Form: Overall logical and organized.   Thought Content: Appropriate to interview and situation.  Cognition/Memory: Not formally assessed, but no difficulties apparent upon interview.   Attention/Concentration: Good throughout interview.    Fund of knowledge: Consistent with age and level of education.    Abstract reasoning: Not assessed.   Judgment: Intact.    Mood/Affect: Mood anxious; appropriate range of affect.    Insight/Motivation: Good, good  Suicide/Assault: Patient denies suicidal or assaultive ideation, plan, or intent.    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  Predisposing factors to developing chronic digestive symptoms likely include a baseline history of anxiety, family history of anxiety, and personality traits associated with perfectionism.  It appears  her thoughts and behaviors, primarily focused on problem solving and elimination of symptoms are maintaining factors, with her acknowledging that these coping approaches often increase her anxiety and worsen symptoms.  She appears to be a good candidate for mindfulness based cognitive behavioral interventions for treatment of a disorder of gut brain interaction.  Provided brief psychoeducation of mind gut connection and course of care.  She agreed with treatment recommendations and a treatment plan will be completed at the next visit.    Diagnosis:  Adjustment disorder with anxious mood  Rule out primary anxiety disorder    *In accordance with the Rules of the [place] Board of [employer], it is noted that psychological descriptions and scientific procedures underlying psychological evaluations have limitations.  Absolute predictions cannot be made based on information in this report.    *no letter    This note was completed using Dragon voice recognition software.  Although reviewed after completion, some word and grammatical errors may occur.      Again, thank you for allowing me to participate in the care of your patient.      Sincerely,    Sherice Warren, PhD

## 2023-08-11 NOTE — PROGRESS NOTES
This telehealth service is appropriate and effective for delivering services in light of the necessity for social distancing to mitigate the COVID-19 epidemic and for conservation of PPE.     Patient has agreed to receiving telehealth services after being informed about it: Yes    Patient prefers video invitation/information to be sent by:   email    Time service started: 1:02 PM   Time service ended: 1:58 PM    Mode of transmission: Floobits    Location of originating:  Home of the patient    Distance site:  Home office of provider for [facility]    The patient has been notified that:  Video visits will be conducted via a call with their psychologist to provide the care they need with a video conversation. Video visits may be billed at different rates depending on insurance coverage.  Patients are advised to please contact their insurance provider with any questions about their health insurance coverage. If during the course of a call the psychologist feels a video visit is not appropriate, patients will not be charged for this service.        Confidential Summary of Standard Psychodiagnostic Evaluation*    Referral Source:  MARIA D Vogel, CNP    Reason for Referral:  Evaluation of role of brain-gut dysfunction and psychosocial factors that impact GI condition and provide treatment as indicated    Sources of Information:  Information was obtained from a clinical interview with the patient, review of available medical records, and administration of psychological assessments.     Informed Consent:  Informed consent included a review of the nature and purpose of the assessment, billing, and confidentiality and limits thereof. Discussed role of GI psychologist in multidisciplinary GI care team in and documentation of visit in EMR.The patient expressed understanding, provided verbal consent, and agreed to proceed.    History of Presenting Concerns:  Selina Jimenez is a 32 year old female who presents with a history  "of chronic digestive concerns that include abdominal bloating, excessive gas, and abdominal pain.  She also presents with a history of GERD with no daily symptoms.  Chart review indicates some fluctuation between constipation and runny stools, yet her main concern today is the pain associated with bloating and gas.  She describes her digestive health concerns to include feeling similar to \"gut rot\" or having a \"sour stomach.\"  She described the bloating sensation to sometimes feel as if there is pressure in her abdominal cavity.  She also describes feeling sensations in her lower abdominal region similar to period cramps, and describes the sensations as a squeezing viselike sensation.  She describes having a long history of bloating and gas, currently this presents approximately 3 to 4 days/week or half of the time.  Despite this long history, symptoms first became noticeably problematic for her in 2022.  In fall 2022, she began consistently experiencing gas and bloating, that was bothersome even when she was at rest.  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  Of note, she describes herself as a person who tends to approach stressors and problems from a critical/analytic problem solving stance, which does not often help her cope more effectively with chronic digestive symptoms; rather, she describes increasing stress as she feels she is not able to control her digestive symptoms effectively.    Currently, she described her pain to typically be a 6 out of 10, manageable but annoying.  Pain is better in the morning and " worsens over the course of the day, with it worsening after lunch and into the evenings.  She describes no clear trigger to worsening pain but does acknowledge a link between increased pain and fried foods and carbohydrate intake.    She has began to work with medical nutrition therapy with Marta Dugan MS, RD, LD; she is found eating more frequent smaller meals, and eating in response to hunger to be helpful.  Regarding eating, she does acknowledge anxiety about eating, often blaming certain foods for symptom experiences and responding with the believes that she should not have eaten those foods.  She does acknowledge feeling as if she has an experience of a downward spiral related to symptoms, where she notices and feels sensations, worries believes there is a problem about the sensation itself, and engages in problem solving efforts that do not relieve symptoms, and thus increases stress.    She has previously had a work-up with GI that included a colonoscopy and laboratory testing that found no positive findings for inflammation or structural causes for symptoms.      Medical History:    Past Medical History:   Diagnosis Date    History of colonic polyps     dx age 12, multiple removed, benign       Past Surgical History:   Procedure Laterality Date    TONSILLECTOMY & ADENOIDECTOMY      childhood       Current Outpatient Medications   Medication    MAGNESIUM CITRATE PO    Multiple Vitamins-Minerals (MULTIVITAMIN WOMEN PO)     No current facility-administered medications for this visit.       Patient Care Team:  Leslie Hutton APRN CNP as PCP - General (Family Medicine)  Kathi Banda MD as MD (Gastroenterology)  Mayra Francisco MD as Fellow (Gastroenterology)  Marta Dugan RD as Registered Dietitian (Dietitian, Registered)  Leslie Hutton APRN CNP as Assigned PCP       Psychiatric History:  Current symptoms: Moderate levels of anxiety which present with worry and physiologic manifestations,  minimal symptoms of depression  Outpatient treatment: She acknowledged engaging in psychotherapy associated with an employee assistance program in fall 2021 due to stress related to her job.  Psychotropic medication use: No current use  Inpatient treatment: No history of inpatient psychiatric treatment or suicidal ideation, plan or intent  Family history: Acknowledges a history of anxiety in her mother    Substance Use History:  No current or past alcohol or drug misuse    Social History:  She is currently working full-time.  Julius with anxiety using the following strategies: Spending time outside, creating and following a to do list, using essential oils, or strict movement via stretching.  However she does acknowledge a history of coping with stress via pushing things down.  She grew up as eldest of 3 daughters, describing herself as a high performer academically and somebody who tends to put high self-imposed pressures on oneself.  She acknowledged it is hard to relax.  Has a bachelor's in math education is her highest level of education.    Psychological Assessment:  General Psychosocial Functioning  The patient completed the following battery of assessments during this psychological evaluation: World Health Organization Disability Assessment Schedule 2.0 12-item (WHODAS), Patient Health Questionnaire-9 (PHQ-9), Generalized Anxiety Disorder-7 screener (POLA-7), and the CAGE Questionnaire Adapted to Include Drugs (CAGE-AID).    The WHODAS measures disability and functional impairment due to health conditions including diseases, illnesses, injuries, mental or emotional problems, and problems with alcohol or drugs. The possible range of scores is 12-60 and higher scores indicate higher levels of disability.         8/17/2023     8:25 PM   WHODAS 2.0 Total Score   Total Score 17   Total Score MyChart 17         The PHQ-9 is an instrument for screening, diagnosing, monitoring and measuring the severity of depression.  Scores of 5, 10, 15, and 20 represent cutpoints for mild, moderate, moderately severe and severe depression, respectively.         7/23/2023     3:58 PM 8/17/2023     8:20 PM   PHQ   PHQ-9 Total Score 4 5   Q9: Thoughts of better off dead/self-harm past 2 weeks Not at all Not at all          The POLA-7 is an instrument for screening, diagnosing, monitoring and measuring the severity of anxiety. Scores of 5, 10, and 15 represent cutpoints for mild, moderate, and severe anxiety, respectively.          7/23/2023     3:58 PM 8/17/2023     8:22 PM   POLA-7 SCORE   Total Score 11 (moderate anxiety) 11 (moderate anxiety)   Total Score 11 11          The CAGE-AID questionnaire is used to screen for alcohol or drug abuse and dependence in adults. A CAGE-AID score  > 1 is a positive screen, suggesting further discussion is needed to determine if evaluation for alcohol or substance abuse is appropriate. A score > 2 is considered clinically significant, suggesting further evaluation of alcohol or substance-related problems is indicated.        8/17/2023     8:26 PM   CAGE-AID Total Score   Total Score 1   Total Score MyChart 1 (A total score of 2 or greater is considered clinically significant)     Pain Catastrophizing Scale      8/17/2023     8:29 PM   Pain Catastrophizing Scale   I worry all the time about whether the pain will end 1 = To a slight degree   I feel I can't go on 0 = Not at all   It's terrible and I think it's never going to get any better 2 = To a moderate degree   It's awful and I feel that it overwhelms me 4 = All the time   I feel I can't stand it anymore 1 = To a slight degree   I become afraid that the pain will get worse 2 = To a moderate degree   I keep thinking of other painful events 2 = To a moderate degree   I anxiously want the pain to go away 4 = All the time   I can't seem to keep it out of my mind 4 = All the time   I keep thinking about how much it hurts 1 = To a slight degree   I keep thinking  about how badly I want the pain to stop 3 = To a great degree   There's nothing I can do to reduce the intensity of the pain 1 = To a slight degree   I wonder whether something serious may happen 1 = To a slight degree   Total Pain Scale Scoring Question 26       Mental Status Examination:  Appearance/Behavior/Orientation: Patient was on time, appropriately groomed and dressed, and demonstrated good eye contact. Alert and oriented to person, place, time, and situation. No evidence of psychomotor agitation.     Cooperation/Reliability: Patient was open and cooperative throughout the session.    Speech/Language: Speech was clear, coherent, and of normal rate, rhythm and volume.   Thought Form: Overall logical and organized.   Thought Content: Appropriate to interview and situation.  Cognition/Memory: Not formally assessed, but no difficulties apparent upon interview.   Attention/Concentration: Good throughout interview.    Fund of knowledge: Consistent with age and level of education.    Abstract reasoning: Not assessed.   Judgment: Intact.    Mood/Affect: Mood anxious; appropriate range of affect.    Insight/Motivation: Good, good  Suicide/Assault: Patient denies suicidal or assaultive ideation, plan, or intent.    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  Predisposing factors to developing chronic digestive symptoms likely include a baseline history of anxiety, family history of anxiety, and personality traits associated with perfectionism.  It appears her thoughts and behaviors, primarily focused on problem solving and elimination of symptoms are maintaining factors, with her acknowledging that these coping approaches often increase her anxiety and worsen symptoms.  She appears to be a good candidate for mindfulness based  cognitive behavioral interventions for treatment of a disorder of gut brain interaction.  Provided brief psychoeducation of mind gut connection and course of care.  She agreed with treatment recommendations and a treatment plan will be completed at the next visit.    Diagnosis:  Adjustment disorder with anxious mood  Rule out primary anxiety disorder      Sherice Warren, PhD,   Clinical Health Psychologist    *In accordance with the Rules of the [place] Board of [employer], it is noted that psychological descriptions and scientific procedures underlying psychological evaluations have limitations.  Absolute predictions cannot be made based on information in this report.    *no letter    This note was completed using Dragon voice recognition software.  Although reviewed after completion, some word and grammatical errors may occur.

## 2023-08-31 ENCOUNTER — VIRTUAL VISIT (OUTPATIENT)
Dept: PSYCHOLOGY | Facility: CLINIC | Age: 33
End: 2023-08-31
Payer: COMMERCIAL

## 2023-08-31 DIAGNOSIS — F43.22 ADJUSTMENT DISORDER WITH ANXIETY: Primary | ICD-10-CM

## 2023-08-31 PROCEDURE — 90837 PSYTX W PT 60 MINUTES: CPT | Mod: VID | Performed by: PSYCHOLOGIST

## 2023-08-31 NOTE — PROGRESS NOTES
Health Psychology Clinic  [facility], 3rd Floor  [address] [place]  [address]    Telemedicine Information  Type of service:   Telemedicine Psychotherapy  Patient location:   Home  Provider location:  Remote office   Mode of Communication:   Video Conference via [employer]   Patient consent to telemedicine services? Yes  It has been determined that telemedicine service is appropriate and effective for this patient in light of the necessity for social distancing to mitigate the COVID-19 epidemic  The patient has been notified that: Video visits will be conducted via a call with their psychologist to provide the care they need with a video conversation. Video visits may be billed at different rates depending on insurance coverage.  Patients are advised to contact their insurance provider with any questions about their health insurance coverage. If during the course of a call the psychologist feels a video visit is not appropriate, patients will not be charged for this service.    Health Psychology Follow-up Note    SUBJECTIVE:  Selina Jimenez was seen for individual psychotherapy. Reviewed emotional and physical health since our last session. The patient reported overall better management of digestive symptoms but when sx where present she used problem solving strategies/blaming/searching for reason.     [place] today focused on creating a treatment plan, providing psychoeducation about the brain-gut connection, and building rapport.      A treatment plan was completed in collaboration with the patient, see below.    Provided psychoeducation about the brain-gut connection, including discussion of the role of the enteric and autonomic nervous systems in DGBI and the biopsychosocial factors that contribute to DGBI . Discussed the role of stress in exacerbating IBS symptoms, including the role of the sympathetic and parasympathetic nervous systems in stress response. Discussed how  cognitive behavioral interventions, such as relaxation training (e.g., diaphragmatic breathing) and cognitive interventions, promote engagement of parasympathetic nervous system and interrupt stress response in order to improvement management of DGBI. Provided instruction about how to practice diaphragmatic breathing and completed a guided practice in the session. Provided resources to facilitate the patient's ability to practice this intervention outside of session.    OBJECTIVE:  Appearance/Behavior/Orientation: Alert and oriented to person, place, time, and situation. No evidence of psychomotor agitation.     Cooperation/Reliability: Patient was open and cooperative throughout the session.    Speech/Language: Speech was clear, coherent, and of normal rate, rhythm and volume.   Thought Form: Overall logical and organized.   Mood/Affect: Mood anxious; appropriate range of affect.    Insight/Motivation: Good, good  Suicide/Assault: Patient denies suicidal or assaultive ideation, plan, or intent.    ASSESSMENT:  Engaged in treatment    DIAGNOSIS:  Adjustment disorder with anxious mood  Rule out primary anxiety disorder    PLAN:  RTC for continued psychotherapy.     Will coordinate care with her general mental health therapist as appropriate, will offer her to sign a release of information.    Start: 12:03 PM  Stop: 12:56 PM  Extended session due to complexity of case and length of interval.    Sherice Warren, PhD, LP, ABPP  Clinical Health Psychologist    Tx plan completed: 08/31/23  Tx plan due:  08/31/24    *no letter    This note was completed using Dragon voice recognition software.  Although reviewed after completion, some word and grammatical errors may occur.     OUTPATIENT TREATMENT PLAN SUMMARY    Date of Treatment Plan: 08/31/23     90-Day Review Date: NA  Date of Initial Service: 8/11/23      DSM-V Diagnosis (include numeric code)  Adjustment disorder with anxious mood     Current symptoms and circumstances  "that substantiate the diagnosis:   Moderate levels of anxiety which present with worry and physiologic manifestations, minimal symptoms of depression     How symptoms and/or behaviors are affecting level of function:   Distress and health impacts    Risk Assessment:  Suicide:  Assessed Level of Immediate Risk: None  Ideation: No  Plan:  No  Means: No  Intent: No    Homicide/Violence:  Assessed Level of Immediate Risk: None  Ideation: No  Plan: No  Means: No  Intent: No    If on a medication, please include name and dosage:  NA      Symptom/Problem Measurable Goals Interventions Gains Made   1. Anticipatory anxiety related to food/digestive health symptoms 1.  Learn 2-3 ways to approach  moments of stress related to illness using CBT skills; learn ways to reduce worry \"something is wrong with me\" and not analyze symptoms 1.CBT 1. TBD   2. Identify triggers - particularly emotion, situational, and food triggers 2.  Identify triggers and effective ways to respond to triggers, including acceptance 2.CBT 2. TBD     Frequency of Sessions: 2 x / Month    Discharge and Aftercare Goals: TBD    Expected duration of treatment:  4-6 sessions    Participants in therapy plan (family, friends, support network): Self      See scanned document for Acknowledgement of Current Treatment Plan - Patient signature not available due to telehealth format of session; verbal consent obtained.    Regulatory Guidelines for Updating Treatment Plan  [place] Medical Assistance: Reviewed & signed at least every 90 days  Medicare:  Update per policy       "

## 2023-09-14 ASSESSMENT — ANXIETY QUESTIONNAIRES
IF YOU CHECKED OFF ANY PROBLEMS ON THIS QUESTIONNAIRE, HOW DIFFICULT HAVE THESE PROBLEMS MADE IT FOR YOU TO DO YOUR WORK, TAKE CARE OF THINGS AT HOME, OR GET ALONG WITH OTHER PEOPLE: NOT DIFFICULT AT ALL
3. WORRYING TOO MUCH ABOUT DIFFERENT THINGS: SEVERAL DAYS
GAD7 TOTAL SCORE: 8
5. BEING SO RESTLESS THAT IT IS HARD TO SIT STILL: SEVERAL DAYS
7. FEELING AFRAID AS IF SOMETHING AWFUL MIGHT HAPPEN: NOT AT ALL
4. TROUBLE RELAXING: NEARLY EVERY DAY
2. NOT BEING ABLE TO STOP OR CONTROL WORRYING: MORE THAN HALF THE DAYS
GAD7 TOTAL SCORE: 8
6. BECOMING EASILY ANNOYED OR IRRITABLE: NOT AT ALL
1. FEELING NERVOUS, ANXIOUS, OR ON EDGE: SEVERAL DAYS

## 2023-09-15 ENCOUNTER — VIRTUAL VISIT (OUTPATIENT)
Dept: PSYCHOLOGY | Facility: CLINIC | Age: 33
End: 2023-09-15
Payer: COMMERCIAL

## 2023-09-15 DIAGNOSIS — F43.22 ADJUSTMENT DISORDER WITH ANXIETY: Primary | ICD-10-CM

## 2023-09-15 PROCEDURE — 90834 PSYTX W PT 45 MINUTES: CPT | Mod: 95 | Performed by: PSYCHOLOGIST

## 2023-09-15 NOTE — PROGRESS NOTES
Health Psychology Clinic  [facility], 3rd Floor  [address] [place]  [address]    Telemedicine Information  Type of service:   Telemedicine Psychotherapy  Patient location:   Home  Provider location:  Remote office   Mode of Communication:   Video Conference via [employer]   Patient consent to telemedicine services? Yes  It has been determined that telemedicine service is appropriate and effective for this patient in light of the necessity for social distancing to mitigate the COVID-19 epidemic  The patient has been notified that: Video visits will be conducted via a call with their psychologist to provide the care they need with a video conversation. Video visits may be billed at different rates depending on insurance coverage.  Patients are advised to contact their insurance provider with any questions about their health insurance coverage. If during the course of a call the psychologist feels a video visit is not appropriate, patients will not be charged for this service.    Health Psychology Follow-up Note    SUBJECTIVE:  Selina Jimenez was seen for individual psychotherapy. Reviewed emotional and physical health since our last session.  She endorsed an improvement in her emotional and physical wellbeing.  Has learned to employ mindful breathing to calm down in stressful moments as well as benefited from the education about mind gut connection discussed to rethink her symptom presentation and reengage with sensations that helps promote wellness.  Overall, we reflected on how she is engaging in responses to sensations that promote wellness and has created a positive feedback loop.  [place] for today focused on coping with anticipatory anxiety, especially as she is getting  next weekend.  Interventions included discussion of mindfulness based, values based and cognitive strategies to help notice and shift anticipatory  worries.    OBJECTIVE:  Appearance/Behavior/Orientation: Alert and oriented to person, place, time, and situation. No evidence of psychomotor agitation.     Cooperation/Reliability: Patient was open and cooperative throughout the session.    Speech/Language: Speech was clear, coherent, and of normal rate, rhythm and volume.   Thought Form: Overall logical and organized.   Mood/Affect: Mood anxious; appropriate range of affect.    Insight/Motivation: Good, good  Suicide/Assault: Patient denies suicidal or assaultive ideation, plan, or intent.    ASSESSMENT:  Engaged in treatment    DIAGNOSIS:  Adjustment disorder with anxious mood  Rule out primary anxiety disorder    PLAN:  RTC for continued psychotherapy.     Will coordinate care with her general mental health therapist as appropriate, will offer her to sign a release of information.    Start: 9:13 AM  Stop: 9:57 AM  Extended session due to complexity of case and length of interval.    Sherice Warren, PhD, , [facility]  Clinical Health Psychologist    Tx plan completed: 08/31/23  Tx plan due:  08/31/24    *no letter    This note was completed using Dragon voice recognition software.  Although reviewed after completion, some word and grammatical errors may occur.     OUTPATIENT TREATMENT PLAN SUMMARY    Date of Treatment Plan: 08/31/23     90-Day Review Date: NA  Date of Initial Service: 8/11/23      DSM-V Diagnosis (include numeric code)  Adjustment disorder with anxious mood     Current symptoms and circumstances that substantiate the diagnosis:   Moderate levels of anxiety which present with worry and physiologic manifestations, minimal symptoms of depression     How symptoms and/or behaviors are affecting level of function:   Distress and health impacts    Risk Assessment:  Suicide:  Assessed Level of Immediate Risk: None  Ideation: No  Plan:  No  Means: No  Intent: No    Homicide/Violence:  Assessed Level of Immediate Risk: None  Ideation: No  Plan: No  Means:  "No  Intent: No    If on a medication, please include name and dosage:  NA      Symptom/Problem Measurable Goals Interventions Gains Made   1. Anticipatory anxiety related to food/digestive health symptoms 1.  Learn 2-3 ways to approach  moments of stress related to illness using CBT skills; learn ways to reduce worry \"something is wrong with me\" and not analyze symptoms 1.CBT 1. TBD   2. Identify triggers - particularly emotion, situational, and food triggers 2.  Identify triggers and effective ways to respond to triggers, including acceptance 2.CBT 2. TBD     Frequency of Sessions: 2 x / Month    Discharge and Aftercare Goals: TBD    Expected duration of treatment:  4-6 sessions    Participants in therapy plan (family, friends, support network): Self      See scanned document for Acknowledgement of Current Treatment Plan - Patient signature not available due to telehealth format of session; verbal consent obtained.    Regulatory Guidelines for Updating Treatment Plan  [place] Medical Assistance: Reviewed & signed at least every 90 days  Medicare:  Update per policy     Answers submitted by the patient for this visit:  POLA-7 (Submitted on 9/14/2023)  POLA 7 TOTAL SCORE: 8    "

## 2023-10-04 ASSESSMENT — ANXIETY QUESTIONNAIRES
IF YOU CHECKED OFF ANY PROBLEMS ON THIS QUESTIONNAIRE, HOW DIFFICULT HAVE THESE PROBLEMS MADE IT FOR YOU TO DO YOUR WORK, TAKE CARE OF THINGS AT HOME, OR GET ALONG WITH OTHER PEOPLE: NOT DIFFICULT AT ALL
6. BECOMING EASILY ANNOYED OR IRRITABLE: NOT AT ALL
5. BEING SO RESTLESS THAT IT IS HARD TO SIT STILL: NOT AT ALL
2. NOT BEING ABLE TO STOP OR CONTROL WORRYING: NOT AT ALL
3. WORRYING TOO MUCH ABOUT DIFFERENT THINGS: NOT AT ALL
GAD7 TOTAL SCORE: 0
4. TROUBLE RELAXING: NOT AT ALL
7. FEELING AFRAID AS IF SOMETHING AWFUL MIGHT HAPPEN: NOT AT ALL
GAD7 TOTAL SCORE: 0
1. FEELING NERVOUS, ANXIOUS, OR ON EDGE: NOT AT ALL

## 2023-10-05 ENCOUNTER — VIRTUAL VISIT (OUTPATIENT)
Dept: PSYCHOLOGY | Facility: CLINIC | Age: 33
End: 2023-10-05
Payer: COMMERCIAL

## 2023-10-05 ENCOUNTER — ANCILLARY PROCEDURE (OUTPATIENT)
Dept: ULTRASOUND IMAGING | Facility: CLINIC | Age: 33
End: 2023-10-05
Attending: ADVANCED PRACTICE MIDWIFE
Payer: COMMERCIAL

## 2023-10-05 DIAGNOSIS — N83.202 LEFT OVARIAN CYST: ICD-10-CM

## 2023-10-05 DIAGNOSIS — F43.22 ADJUSTMENT DISORDER WITH ANXIETY: Primary | ICD-10-CM

## 2023-10-05 PROCEDURE — 76856 US EXAM PELVIC COMPLETE: CPT | Performed by: FAMILY MEDICINE

## 2023-10-05 PROCEDURE — 76830 TRANSVAGINAL US NON-OB: CPT | Performed by: FAMILY MEDICINE

## 2023-10-05 PROCEDURE — 90837 PSYTX W PT 60 MINUTES: CPT | Mod: VID | Performed by: PSYCHOLOGIST

## 2023-10-05 NOTE — PROGRESS NOTES
[facility] Psychology [facility]  [facility]  [address] [place]  [address]    Telemedicine Information  Type of service:   Telemedicine Psychotherapy  Patient location:   Home  Provider location:  Remote office   Mode of Communication:   Video Conference via [employer]   Patient consent to telemedicine services? Yes  It has been determined that telemedicine service is appropriate and effective for this patient in light of the necessity for social distancing to mitigate the COVID-19 epidemic  The patient has been notified that: Video visits will be conducted via a call with their psychologist to provide the care they need with a video conversation. Video visits may be billed at different rates depending on insurance coverage.  Patients are advised to contact their insurance provider with any questions about their health insurance coverage. If during the course of a call the psychologist feels a video visit is not appropriate, patients will not be charged for this service.    Health Psychology Follow-up Note    SUBJECTIVE:  Selina Jimenez was seen for individual psychotherapy. Reviewed emotional and physical health since our last session.  She was able to enjoy her wedding celebration and subsequent honeymoon with managing physical sensations well.  Had the most hardship with anticipatory anxiety yet was able to enjoy herself.  Has had a increase in digestive discomfort and returning to work.  [place] focused on continuing to use brain gut behavioral therapies to improve symptom management.  Discussed emotion focused strategies to help improve how she is coping with the emotional impact of symptoms, interventions applied included mindfulness to connect with breathing to create a greater sense of integration in her mind and body, self compassion, and boundary setting.  Also discussed how physical sensations may relay important messages, including over scheduling  oneself.    OBJECTIVE:  Appearance/Behavior/Orientation: Alert and oriented to person, place, time, and situation. No evidence of psychomotor agitation.     Cooperation/Reliability: Patient was open and cooperative throughout the session.    Speech/Language: Speech was clear, coherent, and of normal rate, rhythm and volume.   Thought Form: Overall logical and organized.   Mood/Affect: Mood anxious; appropriate range of affect.    Insight/Motivation: Good, good  Suicide/Assault: Patient denies suicidal or assaultive ideation, plan, or intent.    ASSESSMENT:  Engaged in treatment    DIAGNOSIS:  Adjustment disorder with anxious mood  Rule out primary anxiety disorder    PLAN:  RTC for continued psychotherapy.     Will coordinate care with her general mental health therapist as appropriate, will offer her to sign a release of information.    Start: 9:04 AM  Stop: 10:01 AM  Extended session due to complexity of case and length of interval.    Sherice Warren, PhD, , ABPP  Clinical Health Psychologist    Tx plan completed: 08/31/23  Tx plan due:  08/31/24    *no letter    This note was completed using Dragon voice recognition software.  Although reviewed after completion, some word and grammatical errors may occur.     OUTPATIENT TREATMENT PLAN SUMMARY    Date of Treatment Plan: 08/31/23     90-Day Review Date: NA  Date of Initial Service: 8/11/23      DSM-V Diagnosis (include numeric code)  Adjustment disorder with anxious mood     Current symptoms and circumstances that substantiate the diagnosis:   Moderate levels of anxiety which present with worry and physiologic manifestations, minimal symptoms of depression     How symptoms and/or behaviors are affecting level of function:   Distress and health impacts    Risk Assessment:  Suicide:  Assessed Level of Immediate Risk: None  Ideation: No  Plan:  No  Means: No  Intent: No    Homicide/Violence:  Assessed Level of Immediate Risk: None  Ideation: No  Plan: No  Means:  "No  Intent: No    If on a medication, please include name and dosage:  NA      Symptom/Problem Measurable Goals Interventions Gains Made   1. Anticipatory anxiety related to food/digestive health symptoms 1.  Learn 2-3 ways to approach  moments of stress related to illness using CBT skills; learn ways to reduce worry \"something is wrong with me\" and not analyze symptoms 1.CBT 1. TBD   2. Identify triggers - particularly emotion, situational, and food triggers 2.  Identify triggers and effective ways to respond to triggers, including acceptance 2.CBT 2. TBD     Frequency of Sessions: 2 x / Month    Discharge and Aftercare Goals: TBD    Expected duration of treatment:  4-6 sessions    Participants in therapy plan (family, friends, support network): Self      See scanned document for Acknowledgement of Current Treatment Plan - Patient signature not available due to telehealth format of session; verbal consent obtained.    Regulatory Guidelines for Updating Treatment Plan  [place] Medical Assistance: Reviewed & signed at least every 90 days  Medicare:  Update per policy     Answers submitted by the patient for this visit:  POLA-7 (Submitted on 9/14/2023)  POLA 7 TOTAL SCORE: 8  Answers submitted by the patient for this visit:  POLA-7 (Submitted on 10/4/2023)  POLA 7 TOTAL SCORE: 0    "

## 2023-10-19 ASSESSMENT — ANXIETY QUESTIONNAIRES
GAD7 TOTAL SCORE: 5
4. TROUBLE RELAXING: SEVERAL DAYS
GAD7 TOTAL SCORE: 5
7. FEELING AFRAID AS IF SOMETHING AWFUL MIGHT HAPPEN: NOT AT ALL
6. BECOMING EASILY ANNOYED OR IRRITABLE: NOT AT ALL
3. WORRYING TOO MUCH ABOUT DIFFERENT THINGS: MORE THAN HALF THE DAYS
IF YOU CHECKED OFF ANY PROBLEMS ON THIS QUESTIONNAIRE, HOW DIFFICULT HAVE THESE PROBLEMS MADE IT FOR YOU TO DO YOUR WORK, TAKE CARE OF THINGS AT HOME, OR GET ALONG WITH OTHER PEOPLE: SOMEWHAT DIFFICULT
2. NOT BEING ABLE TO STOP OR CONTROL WORRYING: SEVERAL DAYS
1. FEELING NERVOUS, ANXIOUS, OR ON EDGE: SEVERAL DAYS
5. BEING SO RESTLESS THAT IT IS HARD TO SIT STILL: NOT AT ALL

## 2023-10-20 ENCOUNTER — VIRTUAL VISIT (OUTPATIENT)
Dept: PSYCHOLOGY | Facility: CLINIC | Age: 33
End: 2023-10-20
Payer: COMMERCIAL

## 2023-10-20 DIAGNOSIS — F43.22 ADJUSTMENT DISORDER WITH ANXIETY: Primary | ICD-10-CM

## 2023-10-20 PROCEDURE — 90837 PSYTX W PT 60 MINUTES: CPT | Mod: VID | Performed by: PSYCHOLOGIST

## 2023-10-20 NOTE — PROGRESS NOTES
Health Psychology Clinic  [facility]  [address] [place]  [address]    Telemedicine Information  Type of service:   Telemedicine Psychotherapy  Patient location:   Home  Provider location:  Remote office   Mode of Communication:   Video Conference via [employer]   Patient consent to telemedicine services? Yes  It has been determined that telemedicine service is appropriate and effective for this patient in light of the necessity for social distancing to mitigate the COVID-19 epidemic  The patient has been notified that: Video visits will be conducted via a call with their psychologist to provide the care they need with a video conversation. Video visits may be billed at different rates depending on insurance coverage.  Patients are advised to contact their insurance provider with any questions about their health insurance coverage. If during the course of a call the psychologist feels a video visit is not appropriate, patients will not be charged for this service.    Health Psychology Follow-up Note    SUBJECTIVE:  Selina NITA Jimenez was seen for individual psychotherapy. Reviewed emotional and physical health since our last session.  Wins include developing habits to pause rather than commit to activities, lessen fear around eating certain foods that she would previously have excluded from her diet.  However she did notice feeling increased digestive distress over the last week and has been focusing on critically examining and analyzing ways to improve this.  [place] focused on strategies that would help her improve her ability to cope with perceived triggers of digestive symptoms, particularly around food and eating.  Provided education and discussed impact of not only nutritional factors of food but other aspects such as routine, consistency, and expectations of food.  Discussed how certain beliefs may be connected to expectations associated with fear and this impacts  the gut brain interaction.  Discussed ways to implement cognitive interventions to reframe beliefs about eating to be more adaptive as well as increase exposure to avoided foods such as vegetables.    OBJECTIVE:  Appearance/Behavior/Orientation: Alert and oriented to person, place, time, and situation. No evidence of psychomotor agitation.     Cooperation/Reliability: Patient was open and cooperative throughout the session.    Speech/Language: Speech was clear, coherent, and of normal rate, rhythm and volume.   Thought Form: Overall logical and organized.   Mood/Affect: Mood anxious; appropriate range of affect.    Insight/Motivation: Good, good  Suicide/Assault: Patient denies suicidal or assaultive ideation, plan, or intent.    ASSESSMENT:  Engaged in treatment    DIAGNOSIS:  Adjustment disorder with anxious mood  Rule out primary anxiety disorder    PLAN:  RTC for continued psychotherapy.     Will coordinate care with her general mental health therapist as appropriate, will offer her to sign a release of information.    Start: 12:05 PM  Stop: 1:01 PM  Extended session due to complexity of case and length of interval.    Sherice Warren, PhD, LP, ABPP  Clinical Health Psychologist    Tx plan completed: 08/31/23  Tx plan due:  08/31/24    *no letter    This note was completed using Dragon voice recognition software.  Although reviewed after completion, some word and grammatical errors may occur.     OUTPATIENT TREATMENT PLAN SUMMARY    Date of Treatment Plan: 08/31/23     90-Day Review Date: NA  Date of Initial Service: 8/11/23      DSM-V Diagnosis (include numeric code)  Adjustment disorder with anxious mood     Current symptoms and circumstances that substantiate the diagnosis:   Moderate levels of anxiety which present with worry and physiologic manifestations, minimal symptoms of depression     How symptoms and/or behaviors are affecting level of function:   Distress and health impacts    Risk  "Assessment:  Suicide:  Assessed Level of Immediate Risk: None  Ideation: No  Plan:  No  Means: No  Intent: No    Homicide/Violence:  Assessed Level of Immediate Risk: None  Ideation: No  Plan: No  Means: No  Intent: No    If on a medication, please include name and dosage:  NA      Symptom/Problem Measurable Goals Interventions Gains Made   1. Anticipatory anxiety related to food/digestive health symptoms 1.  Learn 2-3 ways to approach  moments of stress related to illness using CBT skills; learn ways to reduce worry \"something is wrong with me\" and not analyze symptoms 1.CBT 1. TBD   2. Identify triggers - particularly emotion, situational, and food triggers 2.  Identify triggers and effective ways to respond to triggers, including acceptance 2.CBT 2. TBD     Frequency of Sessions: 2 x / Month    Discharge and Aftercare Goals: TBD    Expected duration of treatment:  4-6 sessions    Participants in therapy plan (family, friends, support network): Self      See scanned document for Acknowledgement of Current Treatment Plan - Patient signature not available due to telehealth format of session; verbal consent obtained.    Regulatory Guidelines for Updating Treatment Plan  [place] Medical Assistance: Reviewed & signed at least every 90 days  Medicare:  Update per policy    Answers submitted by the patient for this visit:  POLA-7 (Submitted on 10/19/2023)  POLA 7 TOTAL SCORE: 5      "

## 2023-11-09 ENCOUNTER — VIRTUAL VISIT (OUTPATIENT)
Dept: PSYCHOLOGY | Facility: CLINIC | Age: 33
End: 2023-11-09
Payer: COMMERCIAL

## 2023-11-09 DIAGNOSIS — F43.22 ADJUSTMENT DISORDER WITH ANXIETY: Primary | ICD-10-CM

## 2023-11-09 PROCEDURE — 90834 PSYTX W PT 45 MINUTES: CPT | Mod: VID | Performed by: PSYCHOLOGIST

## 2023-11-09 NOTE — PROGRESS NOTES
Health Psychology Clinic  [facility]  [address] [place]  [address]    Telemedicine Information  Type of service:   Telemedicine Psychotherapy  Patient location:   Work  Provider location:  Remote office   Mode of Communication:   Video Conference via [employer]   Patient consent to telemedicine services? Yes  It has been determined that telemedicine service is appropriate and effective for this patient in light of the necessity for social distancing to mitigate the COVID-19 epidemic  The patient has been notified that: Video visits will be conducted via a call with their psychologist to provide the care they need with a video conversation. Video visits may be billed at different rates depending on insurance coverage.  Patients are advised to contact their insurance provider with any questions about their health insurance coverage. If during the course of a call the psychologist feels a video visit is not appropriate, patients will not be charged for this service.    Health Psychology Follow-up Note    SUBJECTIVE:  Selina Jimenez was seen for individual psychotherapy. Reviewed emotional and physical health since our last session.  She feels as if she is continuing to manage her digestive health and anxiety symptoms well.  While there are ups and downs, she feels as if she has any effective tools to manage symptoms.  Today focused on planning for concluding this course of treatment.  Discussed ways of continuing to implement cognitive behavioral approaches for bloating as this symptom was bothersome to her of late.  Provided psychoeducation about the biopsychosocial perspective related to bloating and distention.  Continue to discuss ways to utilize an emotion focused coping approach rather than a problem focused approach which came continues to escalate the symptoms monitoring and distress.  Explored and reviewed feelings pertaining to body image and how these  relate to distress related to the sensations.  Discussed ways to apply acceptance vs control in symptoms management.     OBJECTIVE:  Appearance/Behavior/Orientation: Alert and oriented to person, place, time, and situation. No evidence of psychomotor agitation.     Cooperation/Reliability: Patient was open and cooperative throughout the session.    Speech/Language: Speech was clear, coherent, and of normal rate, rhythm and volume.   Thought Form: Overall logical and organized.   Mood/Affect: Mood mildly anxious; appropriate range of affect.    Insight/Motivation: Good, good  Suicide/Assault: Patient reports no suicidal or assaultive ideation, plan, or intent.    ASSESSMENT:  Engaged in treatment    DIAGNOSIS:  Adjustment disorder with anxious mood  Rule out primary anxiety disorder    PLAN:  Discontinue this course of care and will continue therapy as needed.  Patient will reach out to schedule follow-up appointments if concerns continue.  Start: 11:09 AM  Stop: 11:55 PM  Extended session due to complexity of case and length of interval.    Sherice Warren, PhD, , ABPP  Clinical Health Psychologist    Tx plan completed: 08/31/23  Tx plan due:  08/31/24    *no letter    This note was completed using Dragon voice recognition software.  Although reviewed after completion, some word and grammatical errors may occur.     OUTPATIENT TREATMENT PLAN SUMMARY    Date of Treatment Plan: 08/31/23     90-Day Review Date: NA  Date of Initial Service: 8/11/23      DSM-V Diagnosis (include numeric code)  Adjustment disorder with anxious mood     Current symptoms and circumstances that substantiate the diagnosis:   Moderate levels of anxiety which present with worry and physiologic manifestations, minimal symptoms of depression     How symptoms and/or behaviors are affecting level of function:   Distress and health impacts    Risk Assessment:  Suicide:  Assessed Level of Immediate Risk: None  Ideation: No  Plan:  No  Means:  "No  Intent: No    Homicide/Violence:  Assessed Level of Immediate Risk: None  Ideation: No  Plan: No  Means: No  Intent: No    If on a medication, please include name and dosage:  NA      Symptom/Problem Measurable Goals Interventions Gains Made   1. Anticipatory anxiety related to food/digestive health symptoms 1.  Learn 2-3 ways to approach  moments of stress related to illness using CBT skills; learn ways to reduce worry \"something is wrong with me\" and not analyze symptoms 1.CBT 1. TBD   2. Identify triggers - particularly emotion, situational, and food triggers 2.  Identify triggers and effective ways to respond to triggers, including acceptance 2.CBT 2. TBD     Frequency of Sessions: 2 x / Month    Discharge and Aftercare Goals: TBD    Expected duration of treatment:  4-6 sessions    Participants in therapy plan (family, friends, support network): Self      See scanned document for Acknowledgement of Current Treatment Plan - Patient signature not available due to telehealth format of session; verbal consent obtained.    Regulatory Guidelines for Updating Treatment Plan  [place] Medical Assistance: Reviewed & signed at least every 90 days  Medicare:  Update per policy        Answers submitted by the patient for this visit:  POLA-7 (Submitted on 11/1/2023)  POLA 7 TOTAL SCORE: 3    "

## 2023-12-28 ENCOUNTER — E-VISIT (OUTPATIENT)
Dept: PEDIATRICS | Facility: CLINIC | Age: 33
End: 2023-12-28
Payer: COMMERCIAL

## 2023-12-28 DIAGNOSIS — K21.9 GASTROESOPHAGEAL REFLUX DISEASE, UNSPECIFIED WHETHER ESOPHAGITIS PRESENT: Primary | ICD-10-CM

## 2023-12-28 PROCEDURE — 99421 OL DIG E/M SVC 5-10 MIN: CPT | Performed by: PEDIATRICS

## 2023-12-29 RX ORDER — ESOMEPRAZOLE MAGNESIUM 40 MG/1
40 CAPSULE, DELAYED RELEASE ORAL
Qty: 30 CAPSULE | Refills: 1 | Status: SHIPPED | OUTPATIENT
Start: 2023-12-29 | End: 2024-03-13

## 2024-01-15 ENCOUNTER — PATIENT OUTREACH (OUTPATIENT)
Dept: GASTROENTEROLOGY | Facility: CLINIC | Age: 34
End: 2024-01-15
Payer: COMMERCIAL

## 2024-02-28 ENCOUNTER — PATIENT OUTREACH (OUTPATIENT)
Dept: CARE COORDINATION | Facility: CLINIC | Age: 34
End: 2024-02-28
Payer: COMMERCIAL

## 2024-03-13 ENCOUNTER — PATIENT OUTREACH (OUTPATIENT)
Dept: CARE COORDINATION | Facility: CLINIC | Age: 34
End: 2024-03-13

## 2024-03-13 ENCOUNTER — VIRTUAL VISIT (OUTPATIENT)
Dept: OBGYN | Facility: CLINIC | Age: 34
End: 2024-03-13
Payer: COMMERCIAL

## 2024-03-13 DIAGNOSIS — O36.80X0 PREGNANCY WITH INCONCLUSIVE FETAL VIABILITY: ICD-10-CM

## 2024-03-13 DIAGNOSIS — Z34.01 ENCOUNTER FOR SUPERVISION OF NORMAL FIRST PREGNANCY IN FIRST TRIMESTER: Primary | ICD-10-CM

## 2024-03-13 DIAGNOSIS — Z13.79 GENETIC SCREENING: ICD-10-CM

## 2024-03-13 PROBLEM — Z34.00 SUPERVISION OF NORMAL FIRST PREGNANCY: Status: ACTIVE | Noted: 2024-03-13

## 2024-03-13 PROCEDURE — 99207 PR NO CHARGE NURSE ONLY: CPT | Mod: 93

## 2024-03-13 RX ORDER — VITAMIN B COMPLEX
TABLET ORAL DAILY
COMMUNITY

## 2024-03-13 RX ORDER — VITAMIN A ACETATE, .BETA.-CAROTENE, ASCORBIC ACID, CHOLECALCIFEROL, .ALPHA.-TOCOPHEROL ACETATE, DL-, THIAMINE MONONITRATE, RIBOFLAVIN, NIACINAMIDE, PYRIDOXINE HYDROCHLORIDE, FOLIC ACID, CYANOCOBALAMIN, CALCIUM CARBONATE, FERROUS FUMARATE, ZINC OXIDE, AND CUPRIC OXIDE 2000; 2000; 120; 400; 22; 1.84; 3; 20; 10; 1; 12; 200; 27; 25; 2 [IU]/1; [IU]/1; MG/1; [IU]/1; MG/1; MG/1; MG/1; MG/1; MG/1; MG/1; UG/1; MG/1; MG/1; MG/1; MG/1
1 TABLET ORAL DAILY
COMMUNITY

## 2024-03-13 NOTE — PATIENT INSTRUCTIONS
Learning About Pregnancy  Your Care Instructions     Your health in the early weeks of your pregnancy is particularly important for your baby's health. Take good care of yourself. Anything you do that harms your body can also harm your baby.  Make sure to go to all of your doctor appointments. Regular checkups will help keep you and your baby healthy.  How can you care for yourself at home?  Diet    Eat a balanced diet. Make sure your diet includes plenty of beans, peas, and leafy green vegetables.     Do not skip meals or go for many hours without eating. If you are nauseated, try to eat a small, healthy snack every 2 to 3 hours.     Do not eat fish that has a high level of mercury, such as shark, swordfish, or mackerel. Do not eat more than one can of tuna each week.     Drink plenty of fluids. If you have kidney, heart, or liver disease and have to limit fluids, talk with your doctor before you increase the amount of fluids you drink.     Cut down on caffeine, such as coffee, tea, and cola.     Do not drink alcohol, such as beer, wine, or hard liquor.     Take a multivitamin that contains at least 400 micrograms (mcg) of folic acid to help prevent birth defects. Fortified cereal and whole wheat bread are good additional sources of folic acid.     Increase the calcium in your diet. Try to drink a quart of skim milk each day. You may also take calcium supplements and choose foods such as cheese and yogurt.   Lifestyle    Make sure you go to your follow-up appointments.     Get plenty of rest. You may be unusually tired while you are pregnant.     Get at least 30 minutes of exercise on most days of the week. Walking is a good choice. If you have not exercised in the past, start out slowly. Take several short walks each day.     Do not smoke. If you need help quitting, talk to your doctor about stop-smoking programs. These can increase your chances of quitting for good.     Do not touch cat feces or litter boxes.  Also, wash your hands after you handle raw meat, and fully cook all meat before you eat it. Wear gloves when you work in the yard or garden, and wash your hands well when you are done. Cat feces, raw or undercooked meat, and contaminated dirt can cause an infection that may harm your baby or lead to a miscarriage.     Avoid things that can make your body too hot and may be harmful to your baby, such as a hot tub or sauna. Or talk with your doctor before doing anything that raises your body temperature. Your doctor can tell you if it's safe.     Avoid chemical fumes, paint fumes, or poisons.     Do not use illegal drugs, marijuana, or alcohol.   Medicines    Review all of your medicines with your doctor. Some of your routine medicines may need to be changed to protect your baby.     Use acetaminophen (Tylenol) to relieve minor problems, such as a mild headache or backache or a mild fever with cold symptoms. Do not use nonsteroidal anti-inflammatory drugs (NSAIDs), such as ibuprofen (Advil, Motrin) or naproxen (Aleve), unless your doctor says it is okay.     Do not take two or more pain medicines at the same time unless the doctor told you to. Many pain medicines have acetaminophen, which is Tylenol. Too much acetaminophen (Tylenol) can be harmful.     Take your medicines exactly as prescribed. Call your doctor if you think you are having a problem with your medicine.   To manage morning sickness    If you feel sick when you first wake up, try eating a small snack (such as crackers) before you get out of bed. Allow some time to digest the snack, and then get out of bed slowly.     Do not skip meals or go for long periods without eating. An empty stomach can make nausea worse.     Eat small, frequent meals instead of three large meals each day.     Drink plenty of fluids.     Eat foods that are high in protein but low in fat.     If you are taking iron supplements, ask your doctor if they are necessary. Iron can make  "nausea worse.     Avoid any smells, such as coffee, that make you feel sick.     Get lots of rest. Morning sickness may be worse when you are tired.   Follow-up care is a key part of your treatment and safety. Be sure to make and go to all appointments, and call your doctor if you are having problems. It's also a good idea to know your test results and keep a list of the medicines you take.  Where can you learn more?  Go to [email or web address]  Enter E868 in the search box to learn more about \"Learning About Pregnancy.\"  Current as of: July 11, 2023               Content Version: 13.8    9875-4800 [employer].   Care instructions adapted under license by your healthcare professional. If you have questions about a medical condition or this instruction, always ask your healthcare professional. [employer] disclaims any warranty or liability for your use of this information.      Weeks 6 to 10 of Your Pregnancy: Care Instructions  During these weeks of pregnancy, your body goes through many changes. You may start to feel different, both in your body and your emotions. Each pregnancy is different, so there's no \"right\" way to feel. These early weeks are a time to make healthy choices for you and your pregnancy.    Take a daily prenatal vitamin. Choose one with folic acid in it.   Avoid alcohol, tobacco, and drugs (including marijuana). If you need help quitting, talk to your doctor.     Drink plenty of liquids.  Be sure to drink enough water. And limit sodas, other sweetened drinks, and caffeine.     Choose foods that are good sources of calcium, iron, and folate.  You can try dairy products, dark leafy greens, fortified orange juice and cereals, almonds, broccoli, dried fruit, and beans.     Avoid foods that may be harmful.  Don't eat raw meat, deli meat, raw seafood, or raw eggs. Avoid soft cheese and unpasteurized dairy, like Brie and blue cheese. And don't eat fish that " "contains a lot of mercury, like shark and swordfish.     Don't touch jalen litter or cat poop.  They can cause an infection that could be harmful during pregnancy.     Avoid things that can make your body too hot.  For example, avoid hot tubs and saunas.     Soothe morning sickness.  Try eating 5 or 6 small meals a day, getting some fresh air, or using nidhi to control symptoms.     Ask your doctor about flu and COVID-19 shots.  Getting them can help protect against infection.   Follow-up care is a key part of your treatment and safety. Be sure to make and go to all appointments, and call your doctor if you are having problems. It's also a good idea to know your test results and keep a list of the medicines you take.  Where can you learn more?  Go to [email or web address]  Enter G112 in the search box to learn more about \"Weeks 6 to 10 of Your Pregnancy: Care Instructions.\"  Current as of: July 11, 2023               Content Version: 13.8 2006-2023 [employer].   Care instructions adapted under license by your healthcare professional. If you have questions about a medical condition or this instruction, always ask your healthcare professional. [employer] disclaims any warranty or liability for your use of this information.         Managing Morning Sickness (01:55)  Your health professional recommends that you watch this short online health video.  Learn tips for dealing with morning sickness, no matter what time of day you have it.  Purpose:  Gives tips for managing morning sickness, including eating small low-fat meals and avoiding caffeine and spicy food.  Goal:  The user will learn tips for dealing with morning sickness during pregnancy.     How to watch the video    Scan the QR code   OR Visit the website    [email or web address]   Current as of: July 11, 2023               Content Version: 13.8 2006-2023 [employer].   Care " instructions adapted under license by your healthcare professional. If you have questions about a medical condition or this instruction, always ask your healthcare professional. [employer] disclaims any warranty or liability for your use of this information.      Pregnancy and Heartburn: Care Instructions  Overview     Heartburn is a common problem during pregnancy.  Heartburn happens when stomach acid backs up into the tube that carries food to the stomach. This tube is called the esophagus. Early in pregnancy, heartburn is caused by hormone changes that slow down digestion. Later on, it's also caused by the large uterus pushing up on the stomach.  Even though you can't fix the cause, there are things you can do to get relief. Treating heartburn during pregnancy focuses first on making lifestyle changes, like changing what and how you eat, and on taking medicines.  Heartburn usually improves or goes away after childbirth.  Follow-up care is a key part of your treatment and safety. Be sure to make and go to all appointments, and call your doctor if you are having problems. It's also a good idea to know your test results and keep a list of the medicines you take.  How can you care for yourself at home?  Eat small, frequent meals.  Avoid foods that make your symptoms worse, such as chocolate, peppermint, and spicy foods. Avoid drinks with caffeine, such as coffee, tea, and sodas.  Avoid bending over or lying down after meals.  Take a short walk after you eat.  If heartburn is a problem at night, do not eat for 2 hours before bedtime.  Take antacids like Mylanta, Maalox, Rolaids, or Tums. Do not take antacids that have sodium bicarbonate, magnesium trisilicate, or aspirin. Be careful when you take over-the-counter antacid medicines. Many of these medicines have aspirin in them. While you are pregnant, do not take aspirin or medicines that contain aspirin unless your doctor says it is okay.  If you're not  "getting relief, talk to your doctor. You may be able to take a stronger acid-reducing medicine.  When should you call for help?   Call your doctor now or seek immediate medical care if:    You have new or worse belly pain.     You are vomiting.   Watch closely for changes in your health, and be sure to contact your doctor if:    You have new or worse symptoms of reflux.     You are losing weight.     You have trouble or pain swallowing.     You do not get better as expected.   Where can you learn more?  Go to https://www.[email or web address].net/patiented  Enter U946 in the search box to learn more about \"Pregnancy and Heartburn: Care Instructions.\"  Current as of: July 11, 2023               Content Version: 13.8    7100-9846 [employer].   Care instructions adapted under license by your healthcare professional. If you have questions about a medical condition or this instruction, always ask your healthcare professional. [employer] disclaims any warranty or liability for your use of this information.      Constipation: Care Instructions  Overview     Constipation means that you have a hard time passing stools (bowel movements). People pass stools from 3 times a day to once every 3 days. What is normal for you may be different. Constipation may occur with pain in the rectum and cramping. The pain may get worse when you try to pass stools. Sometimes there are small amounts of bright red blood on toilet paper or the surface of stools. This is because of enlarged veins near the rectum (hemorrhoids).  A few changes in your diet and lifestyle may help you avoid ongoing constipation. Your doctor may also prescribe medicine to help loosen your stool.  Some medicines can cause constipation. These include pain medicines and antidepressants. Tell your doctor about all the medicines you take. Your doctor may want to make a medicine change to ease your symptoms.  Follow-up care is a key part of your treatment " "and safety. Be sure to make and go to all appointments, and call your doctor if you are having problems. It's also a good idea to know your test results and keep a list of the medicines you take.  How can you care for yourself at home?  Drink plenty of fluids. If you have kidney, heart, or liver disease and have to limit fluids, talk with your doctor before you increase the amount of fluids you drink.  Include high-fiber foods in your diet each day. These include fruits, vegetables, beans, and whole grains.  Get at least 30 minutes of exercise on most days of the week. Walking is a good choice. You also may want to do other activities, such as running, swimming, cycling, or playing tennis or team sports.  Take a fiber supplement, such as Citrucel or Metamucil, every day. Read and follow all instructions on the label.  Schedule time each day for a bowel movement. A daily routine may help. Take your time having a bowel movement, but don't sit for more than 10 minutes at a time. And don't strain too much.  Support your feet with a small step stool when you sit on the toilet. This helps flex your hips and places your pelvis in a squatting position.  Your doctor may recommend an over-the-counter laxative to relieve your constipation. Examples are Milk of Magnesia and MiraLax. Read and follow all instructions on the label. Do not use laxatives on a long-term basis.  When should you call for help?   Call your doctor now or seek immediate medical care if:    You have new or worse belly pain.     You have new or worse nausea or vomiting.     You have blood in your stools.   Watch closely for changes in your health, and be sure to contact your doctor if:    Your constipation is getting worse.     You do not get better as expected.   Where can you learn more?  Go to https://www.healthwise.net/patiented  Enter P343 in the search box to learn more about \"Constipation: Care Instructions.\"  Current as of: March 21, " "2023               Content Version: 13.8 2006-2023 [email or web address].   Care instructions adapted under license by your healthcare professional. If you have questions about a medical condition or this instruction, always ask your healthcare professional. [employer] disclaims any warranty or liability for your use of this information.      Learning About High-Iron Foods  What foods are high in iron?     The foods you eat contain nutrients, such as vitamins and minerals. Iron is a nutrient. Your body needs the right amount to stay healthy and work as it should. You can use the list below to help you make choices about which foods to eat.  Here are some foods that contain iron. They have 1 to 2 milligrams of iron per serving.  Fruits  Figs (dried), 5 figs  Vegetables  Asparagus (canned), 6 shetty  Azar, beet, Swiss chard, or turnip greens, 1 cup  Dried peas, cooked,   cup  Seaweed, spirulina (dried),   cup  Spinach, (cooked)   cup or (raw) 1 cup  Grains  Cereals, fortified with iron, 1 cup  Grits (instant, cooked), fortified with iron,   cup  Meats and other protein foods  Beans (kidney, lima, navy, white), canned or cooked,   cup  Beef or lamb, 3 oz  Chicken giblets, 3 oz  Chickpeas (garbanzo beans),   cup  Liver of beef, lamb, or pork, 3 oz  Oysters (cooked), 3 oz  Sardines (canned), 3 oz  Soybeans (boiled),   cup  Tofu (firm),   cup  Work with your doctor to find out how much of this nutrient you need. Depending on your health, you may need more or less of it in your diet.  Where can you learn more?  Go to [email or web address]  Enter R005 in the search box to learn more about \"Learning About High-Iron Foods.\"  Current as of: February 28, 2023               Content Version: 13.8 2006-2023 [email or web address].   Care instructions adapted under license by your healthcare professional. If you have questions about a medical condition or this instruction, always ask your " "healthcare professional. [employer], [facility] disclaims any warranty or liability for your use of this information.      Rh Antibodies Screening During Pregnancy: About This Test  What is it?     The Rh antibodies screening test is a blood test. It checks your blood for Rh antibodies. If you have Rh-negative blood and have been exposed to Rh-positive blood, your immune system may make antibodies to attack the Rh-positive blood. When a pregnant woman has these antibodies, it is called Rh sensitization.  Why is this test done?  The Rh antibodies screening test is done during pregnancy to find out if your baby is at risk for Rh disease. This can happen if you have Rh-negative blood and your baby has Rh-positive blood. If your Rh-negative blood mixes with Rh-positive blood, your immune system will make antibodies to attack the Rh-positive blood.  During pregnancy, these antibodies could attach to the baby's red blood cells. This can cause your baby to have serious health problems. The results of this test will help your doctor know how to best care for you and your baby during your pregnancy.  How do you prepare for the test?  In general, there's nothing you have to do before this test, unless your doctor tells you to.  How is the test done?  A health professional uses a needle to take a blood sample, usually from the arm.  What happens after the test?  You will probably be able to go home right away. It depends on the reason for the test.  You can go back to your usual activities right away.  Follow-up care is a key part of your treatment and safety. Be sure to make and go to all appointments, and call your doctor if you are having problems. It's also a good idea to keep a list of the medicines you take. Ask your doctor when you can expect to have your test results.  Where can you learn more?  Go to https://[email or web address].net/patiented  Enter P722 in the search box to learn more about \"Rh Antibodies Screening " "During Pregnancy: About This Test.\"  Current as of: 2023               Content Version: 13.8    3553-8058 [employer].   Care instructions adapted under license by your healthcare professional. If you have questions about a medical condition or this instruction, always ask your healthcare professional. [employer] disclaims any warranty or liability for your use of this information.      Learning About Preventing Rh Disease  What is Rh disease?     Rh disease can be a serious problem in pregnancy. It happens when substances called antibodies in the mother's blood cause red blood cells in her baby's blood to be destroyed. This can occur when the blood types of a mother and her baby do not match.  All blood has an Rh factor. This is what makes a blood type positive or negative. When you are Rh-negative, your baby may be Rh-negative or Rh-positive. If your baby has Rh-positive blood and it mixes with yours, your body will make antibodies. This is called Rh sensitization.  Most of the time, this is not a problem in a first pregnancy. But in future pregnancies, it could cause Rh disease.  A  with Rh disease has mild anemia and may have jaundice. In severe cases, anemia, jaundice, and swelling can be very dangerous or fatal. Some babies need to be delivered early. Some need special care in the NICU. A very sick baby will need a blood transfusion before or after birth.  Fortunately, Rh sensitization is usually easy to prevent.  That's why it's important to get your Rh status checked in your first trimester. It doesn't cause any warning signs. A blood test is the only way to know if you are Rh-sensitive or are at risk for it.  How can you prevent Rh disease?  If you are Rh-negative, your doctor gives you an Rh immune globulin shot (such as RhoGAM). It helps prevent your body from making the antibodies that attack your baby's red blood cells.  Timing is important. You need the shot " "at certain times during your pregnancy. And you need one anytime there is a chance that your baby's blood might mix with yours. That can happen with certain prenatal tests or when you have pregnancy bleeding, such as:  Right after any pregnancy loss, amniocentesis, or CVS testing.  After turning of a breech baby.  Before and maybe after childbirth. Your doctor gives you a shot around week 28. If your  is Rh-positive, you will have another shot.  Follow-up care is a key part of your treatment and safety. Be sure to make and go to all appointments, and call your doctor if you are having problems. It's also a good idea to know your test results and keep a list of the medicines you take.  Where can you learn more?  Go to [email or web address]  Enter W177 in the search box to learn more about \"Learning About Preventing Rh Disease.\"  Current as of: 2023               Content Version: 13.8    9643-3643 [employer].   Care instructions adapted under license by your healthcare professional. If you have questions about a medical condition or this instruction, always ask your healthcare professional. [employer] disclaims any warranty or liability for your use of this information.      Learning About Rh Immunoglobulin Shots  Introduction     An Rh immunoglobulin shot is given to pregnant women who have Rh-negative blood.  You may have Rh-negative blood, and your baby may have Rh-positive blood. If the two types of blood mix, your body will make antibodies. This is called Rh sensitization. Most of the time, this is not a problem the first time you're pregnant. But it could cause problems in future pregnancies.  This shot keeps your body from making the antibodies. You get the shot around 28 weeks of pregnancy. After the birth, your baby's blood is tested. If the blood is Rh positive, you will get another shot. You may also get the shot if you have vaginal bleeding while " "you are pregnant or if you have a miscarriage. These shots protect future pregnancies.  Women with Rh negative blood will need this shot each time they get pregnant.  Example  Rh immunoglobulin (HypRho-D, MICRhoGAM, and RhoGAM)  Possible side effects  Rare side effects may include:  Some mild pain where you got the shot.  A slight fever.  An allergic reaction.  You may have other side effects not listed here. Check the information that comes with your medicine.  What to know about taking this medicine  You may need more than one shot. You may need the shot again:  After amniocentesis, fetal blood sampling, or chorionic villus sampling tests.  If you have bleeding in your second or third trimester.  After turning of a breech baby.  After an injury to the belly while you are pregnant.  After a miscarriage or an .  Before or right after treatment for an ectopic or a partial molar pregnancy.  Tell your doctor if you have any allergies or have had a bad response to medicines in the past.  If you get this shot within 3 months of getting a live-virus vaccine, the vaccine may not work. Your doctor will tell you if you need more vaccine.  Check with your doctor or pharmacist before you use any other medicines. This includes over-the-counter medicines. Make sure your doctor knows all of the medicines, vitamins, herbs, and supplements you take. Taking some medicines at the same time can cause problems.  Where can you learn more?  Go to https://www.[employer].net/patiented  Enter V615 in the search box to learn more about \"Learning About Rh Immunoglobulin Shots.\"  Current as of: 2023               Content Version: 13.8    9889-7734 [employer].   Care instructions adapted under license by your healthcare professional. If you have questions about a medical condition or this instruction, always ask your healthcare professional. [employer] disclaims any warranty or liability for your use " of this information.      Rubella ([place]izen [place] Measles): Care Instructions  Overview  Rubella, also called [place] measles or 3-day measles, is a disease caused by a virus. It spreads by coughs, sneezes, and close contact. Rubella usually is mild and does not cause long-term problems. But if you are pregnant and get it, you can give the disease to your unborn baby. This can cause serious birth defects.  While you have rubella, you may get a rash and a mild fever, and the lymph glands in your neck may swell. Older children often have a fever, eye pain, a sore throat, and body aches. You can relieve most symptoms with care at home. Avoid being around others, especially pregnant people, until your rash has been gone for at least 4 days. People who have not had this disease before or have not had the vaccine have the greatest chance of getting the virus.  Follow-up care is a key part of your treatment and safety. Be sure to make and go to all appointments, and call your doctor if you are having problems. It's also a good idea to know your test results and keep a list of the medicines you take.  How can you care for yourself at home?  Drink plenty of fluids. If you have kidney, heart, or liver disease and have to limit fluids, talk with your doctor before you increase the amount of fluids you drink.  Get plenty of rest to help your body heal.  Take an over-the-counter pain medicine, such as acetaminophen (Tylenol), ibuprofen (Advil, Motrin), or naproxen (Aleve), to reduce fever and discomfort. Read and follow all instructions on the label. Do not give aspirin to anyone younger than 20. It has been linked to Reye syndrome, a serious illness.  Do not take two or more pain medicines at the same time unless the doctor told you to. Many pain medicines have acetaminophen, which is Tylenol. Too much acetaminophen (Tylenol) can be harmful.  Try not to scratch the rash. Put cold, wet cloths on the rash to reduce itching.  Do not smoke.  "Smoking can make your symptoms worse. If you need help quitting, talk to your doctor about stop-smoking programs and medicines. These can increase your chances of quitting for good.  Avoid contact with people who have never had rubella and who have not been immunized.  When should you call for help?   Call your doctor now or seek immediate medical care if:    You have a fever with a stiff neck or a severe headache.     You are sensitive to light or feel very sleepy or confused.   Watch closely for changes in your health, and be sure to contact your doctor if:    You do not get better as expected.   Where can you learn more?  Go to https://www.[employer].net/patiented  Enter B812 in the search box to learn more about \"Rubella (Malay Measles): Care Instructions.\"  Current as of: 2023               Content Version: 13.8    9409-3713 [employer].   Care instructions adapted under license by your healthcare professional. If you have questions about a medical condition or this instruction, always ask your healthcare professional. [employer] disclaims any warranty or liability for your use of this information.      Gonorrhea and Chlamydia: About These Tests  What is it?  These tests use a sample of urine or other body fluid to look for the bacteria that cause these sexually transmitted infections (STIs). The fluid sample can come from the cervix, vagina, rectum, throat, or eyes.  Why is this test done?  These tests may be done to:  Find out if symptoms are caused by gonorrhea or chlamydia.  Check people who are at high risk of being infected with gonorrhea or chlamydia.  Retest people several months after they have been treated for gonorrhea or chlamydia.  Check for infection in your  if you had a gonorrhea or chlamydia infection at the time of delivery.  How can you prepare for the test?  If you are going to have a urine test, do not urinate for at least 1 hour before the " "test.  If you think you may have chlamydia or gonorrhea, don't have sexual intercourse until you get your test results. And you may want to have tests for other STIs, such as HIV.  How is the test done?  For a direct sample, a swab is used to collect body fluid from the cervix, vagina, rectum, throat, or eyes. Your doctor may collect the sample. Or you may be given instructions on how to collect your own sample.  For a urine sample, you will collect the urine that comes out when you first start to urinate. Don't wipe the genital area clean before you urinate.  How long does the test take?  The test will take a few minutes.  What happens after the test?  You will be able to go home right away.  You can go back to your usual activities right away.  If you do have an infection, don't have sexual intercourse for 7 days after you start treatment. And your sex partner(s) should also be treated.  Follow-up care is a key part of your treatment and safety. Be sure to make and go to all appointments, and call your doctor if you are having problems. It's also a good idea to keep a list of the medicines you take. Ask your doctor when you can expect to have your test results.  Where can you learn more?  Go to https://www.[employer].net/patiented  Enter K976 in the search box to learn more about \"Gonorrhea and Chlamydia: About These Tests.\"  Current as of: April 19, 2023               Content Version: 13.8    5930-2601 [employer].   Care instructions adapted under license by your healthcare professional. If you have questions about a medical condition or this instruction, always ask your healthcare professional. [employer] disclaims any warranty or liability for your use of this information.      Trichomoniasis: About This Test  What is it?     This test uses a sample of urine or other body fluid to look for the tiny parasite that causes trichomoniasis (also called trich). The fluid sample can come " from the vagina, cervix, or urethra. Your doctor may choose to use one or more of many available tests.  Why is it done?  A trich test may be done to:  Find out if symptoms are caused by trich.  Check people who are at high risk for being infected with trich.  Check after treatment to make sure that the infection is gone.  How do you prepare for the test?  If you are going to have a urine test, do not urinate for at least 1 hour before the test.  How is the test done?  For a direct sample, a swab is used to collect body fluid from the cervix, vagina, or urethra. Your doctor may collect the sample. Or you may be given instructions on how to collect your own sample.  For a urine sample, you will collect the urine that comes out when you first start to urinate. Don't wipe the area clean before you urinate.  How long does the test take?  It will take a few minutes to collect a sample.  What happens after the test?  You can go home right away.  You can go back to your usual activities right away.  You may get the test results the same day or several days later. It depends on the test used.  If you do have an infection, don't have sexual intercourse for 7 days after you start treatment. Your sex partner(s) should also be treated.  Follow-up care is a key part of your treatment and safety. Be sure to make and go to all appointments, and call your doctor if you are having problems. Ask your doctor when you can expect to have your test results.  Current as of: April 19, 2023               Content Version: 13.8    6386-3984 [employer].   Care instructions adapted under license by your healthcare professional. If you have questions about a medical condition or this instruction, always ask your healthcare professional. [employer] disclaims any warranty or liability for your use of this information.      HIV Testing: Care Instructions  Overview  You can get tested for the human immunodeficiency virus  "(HIV). Most doctors use a blood test to check for HIV antibodies and antigens in your blood. It may also check for the genetic material (RNA) of HIV. Some tests use saliva to check for HIV antibodies. But these aren't as accurate. For example, they may give a false result if you've just been infected.  What do the results mean?    Normal (negative)    No HIV antibodies, antigens, or RNA were found.  You may need more testing. It can make sure your test results are correct.    Uncertain (indeterminate)    Test results didn't clearly show if you have an HIV infection.  HIV antibodies or antigens may not have formed yet.  Some other type of antibody or antigen may have affected the results.  You will need another test to be sure.    Abnormal (positive)    HIV antibodies, antigens, or RNA were found.  If you haven't had an RNA test yet, one will be done. If it's positive, you have HIV.  If your test result is positive, your doctor will talk to you. You will discuss starting treatment.  Follow-up care is a key part of your treatment and safety. Be sure to make and go to all appointments, and call your doctor if you are having problems. It's also a good idea to know your test results and keep a list of the medicines you take.  Where can you learn more?  Go to https://www.[employer].net/patiented  Enter T792 in the search box to learn more about \"HIV Testing: Care Instructions.\"  Current as of: June 13, 2023               Content Version: 13.8    9036-4933 [employer].   Care instructions adapted under license by your healthcare professional. If you have questions about a medical condition or this instruction, always ask your healthcare professional. [employer] disclaims any warranty or liability for your use of this information.      Hepatitis C Virus Tests: About These Tests  What are they?     Hepatitis C virus tests are blood tests that check for substances in the blood that show whether you " "have hepatitis C now or had it in the past. The tests can also tell you what type of hepatitis C you have and how severe the disease is. This can help your doctor with treatment.  If the tests show that you have long-term hepatitis C, you need to take steps to prevent spreading the disease.  Why are these tests done?  You may need these tests if:  You have symptoms of hepatitis.  You may have been exposed to the virus. You are more likely to have been exposed to the virus if you inject drugs or are exposed to body fluids (such as if you are a health care worker).  You've had other tests that show you have liver problems.  You are 18 to 79 years old.  You have an HIV infection.  The tests also are done to help your doctor decide about your treatment and see how well it works.  How do you prepare for the test?  In general, there's nothing you have to do before this test, unless your doctor tells you to.  How is the test done?  A health professional uses a needle to take a blood sample, usually from the arm.  What happens after these tests?  You will probably be able to go home right away.  You can go back to your usual activities right away.  Follow-up care is a key part of your treatment and safety. Be sure to make and go to all appointments, and call your doctor if you are having problems. It's also a good idea to keep a list of the medicines you take. Ask your doctor when you can expect to have your test results.  Where can you learn more?  Go to https://www.[employer].net/patiented  Enter W551 in the search box to learn more about \"Hepatitis C Virus Tests: About These Tests.\"  Current as of: June 13, 2023               Content Version: 13.8    1673-7530 [employer].   Care instructions adapted under license by your healthcare professional. If you have questions about a medical condition or this instruction, always ask your healthcare professional. [employer] disclaims any warranty or " liability for your use of this information.      Learning About Fetal Ultrasound Results  What is a fetal ultrasound?     Fetal ultrasound is a test that lets your doctor see an image of your baby. Your doctor learns information about your baby from this picture. You may find out, for example, if you are having a boy or a girl. But the main reason you have this test is to get information about your baby's health.  (You may hear your baby called a fetus. This is a common medical term for a baby that's growing in the mother's uterus.)  What kind of information can you learn from this test?  The findings of an ultrasound fall into two categories, normal and abnormal.  Normal  The fetus is the right size for its age.  The placenta is the expected size and does not cover the cervix.  There is enough amniotic fluid in the uterus.  No birth defects can be seen.  Abnormal  The fetus is small or large for its age.  The placenta covers the cervix.  There is too much or too little amniotic fluid in the uterus.  The fetus may have a birth defect.  What does an abnormal result mean?  Abnormal seems to imply that something is wrong with your baby. But what it means is that the test has shown something the doctor wants to take a closer look at.  And that's what happens next. Your doctor will talk to you about what further test or tests you may need.  What do the results mean?  Some of the things your doctor may see on an abnormal ultrasound include:  Echogenic bowel.  The bowel looks very bright on the screen. This could mean that there's blood in the bowel. Or it could mean that something is blocking the small bowel.  Increased nuchal translucency.  The ultrasound measures the thickness at the back of the baby's neck. An increase in thickness is sometimes an early sign of Down syndrome.  Increased or decreased amniotic fluid.  The doctor will look for a reason for the level of amniotic fluid and will watch the pregnancy closely  "as it progresses.  Large ventricles.  Ventricles in the brain look larger than they should. Your doctor may take a closer look at the brain.  Renal pyelectasis/hydronephrosis.  The ultrasound measures the fluid around the kidney. If there is more fluid than expected, there is a chance of urinary tract or kidney problems.  Short long bones.  The ultrasound measures certain arm and leg bones. A long bone (humerus or femur) that is shorter than average could be a sign of Down syndrome.  Subchorionic hemorrhage.  An ultrasound can show bleeding under one of the membranes that surrounds the fetus. Some women don't have symptoms of bleeding. The ultrasound can find this problem when women are not bleeding from their vagina. Women who have this condition have a slightly higher chance of miscarriage.  What do you do now?  Take a deep breath, and let it out. Keep in mind that an abnormal finding on an ultrasound, after it's coupled with more information, may:  Turn out to be nothing.  Turn out to be something mild that won't affect the baby.  Turn out to be something more serious. But if this happens, early diagnosis helps you and your doctor plan treatment options sooner rather than later.  Your medical team is there for you. So are your family and friends. Ask questions, and get the help and support you need.  Follow-up care is a key part of your treatment and safety. Be sure to make and go to all appointments, and call your doctor if you are having problems. It's also a good idea to know your test results and keep a list of the medicines you take.  Where can you learn more?  Go to https://www.[employer].net/patiented  Enter K451 in the search box to learn more about \"Learning About Fetal Ultrasound Results.\"  Current as of: July 11, 2023               Content Version: 13.8    7742-2427 [employer].   Care instructions adapted under license by your healthcare professional. If you have questions about a medical " condition or this instruction, always ask your healthcare professional. [employer], [facility] disclaims any warranty or liability for your use of this information.      Learning About Prenatal Visits  Overview     Regular prenatal visits are very important during any pregnancy. These quick office visits may seem simple and routine. But they can help you have a safe and healthy pregnancy. Your doctor is watching for problems that can only be found through regular checkups. The visits also give you and your doctor time to build a good relationship.  It's common to see your doctor every 4 weeks until week 28 of pregnancy. Then the visits will happen more often. From weeks 28 to 36, it's common to have visits every 2 to 3 weeks. In the final month of pregnancy, you likely will see your doctor every week. Your doctor may want to see you more or less often, depending on your health, your age, and if you've had a normal, full-term pregnancy before.  At different times in your pregnancy, you will have exams and tests. Some are routine. Others are done only when there is a chance of a problem. Everything healthy you do for your body helps you have a healthy pregnancy. Rest when you need it. Eat well, drink plenty of water, and exercise regularly.  What happens during a prenatal visit?  You will have blood pressure checks, along with urine tests. You also may have blood tests. If you need to go to the bathroom while waiting for the doctor, tell the nurse. You will be given a sample cup so your urine can be tested.  You will be weighed and have your belly measured.  Your doctor may listen to the fetal heartbeat with a special device.  At about 24 weeks, and possibly earlier in your pregnancy, your doctor will check your blood sugar (glucose tolerance test) for diabetes that can occur during pregnancy. This is gestational diabetes, which can be harmful.  You will have tests to check for infections that could harm your  ". These include group B streptococcus and hepatitis B.  Your doctor may do ultrasounds to check for problems. This also checks the position of the fetus. An ultrasound uses sound waves to produce a picture of the fetus.  You may get your vaccines updated.  Your doctor may ask you questions to check for signs of anxiety or depression. Tell your doctor if you feel sad, anxious, or hopeless for more than a few days.  You may have other tests at any time during your pregnancy.  Use your visits to discuss with your doctor any concerns you have.  How can you care for yourself at home?  Get plenty of rest.  Try to exercise every day, if your doctor says it is okay. If you have not exercised in the past, start out slowly. For example, you can take short walks each day.  Choose healthy foods, such as fruits, vegetables, whole grains, lean proteins, low-fat dairy, and healthy fats.  Drink plenty of fluids. Cut down on drinks with caffeine, such as coffee, tea, and cola. If you have kidney, heart, or liver disease and have to limit fluids, talk with your doctor before you increase the amount of fluids you drink.  Try to avoid chemical fumes, paint fumes, and poisons.  If you smoke, vape, or use alcohol, marijuana, or other drugs, quit or cut back as much as you can. Talk to your doctor if you need help quitting.  Review all of your medicines, including over-the-counter medicines and supplements, with your doctor. Some of your routine medicines may need to be changed. Do not stop or start taking any medicines without talking to your doctor first.  Follow-up care is a key part of your treatment and safety. Be sure to make and go to all appointments, and call your doctor if you are having problems. It's also a good idea to know your test results and keep a list of the medicines you take.  Where can you learn more?  Go to https://www.healthwise.net/patiented  Enter J502 in the search box to learn more about \"Learning About " "Prenatal Visits.\"  Current as of: July 11, 2023               Content Version: 13.8    5624-0263 [employer].   Care instructions adapted under license by your healthcare professional. If you have questions about a medical condition or this instruction, always ask your healthcare professional. [employer] disclaims any warranty or liability for your use of this information.      Intimate Partner Violence: Care Instructions  Overview     If you want to save this information but don't think it is safe to take it home, see if a trusted friend can keep it for you. Plan ahead. Know who you can call for help, and memorize the phone number.   Be careful online too. Your online activity may be seen by others. Do not use your personal computer or device to read about this topic. Use a safe computer, such as one at work, a friend's home, or a library.    Intimate partner violence--a type of domestic abuse--is different from an argument now and then. It is a pattern of abuse that one person may use to control another person's behavior. It may start with threats and name-calling. Then, it may lead to more serious acts, like pushing and slapping. The abuse also may occur in other areas. For example, the abuser may withhold money or spend a partner's money without their knowledge.  Abuse can cause serious harm. You are more likely to have a long-term health problem from the injuries and stress of living in a violent relationship. People who are sexually abused by their partners have more sexually transmitted infections and unplanned pregnancies. Anyone who is abused also faces emotional pain. Anyone can be abused in relationships. In some relationships, both people use abusive behavior.  If you are pregnant, abuse can cause problems such as poor weight gain, infections, and bleeding. Abuse during this time may increase your baby's risk of low birth weight, premature birth, and death.  Follow-up care is a " "key part of your treatment and safety. Be sure to make and go to all appointments, and call your doctor if you are having problems. It's also a good idea to know your test results and keep a list of the medicines you take.  How can you care for yourself at home?  If you do not have a safe place to stay, discuss this with your doctor before you leave.  Have a plan for where to go, how to leave your home, and where to stay in case of an emergency. Do not tell your partner about your plan. Contact:  The National Domestic Violence Hotline toll-free at 1-579.470.9350. They can help you find resources in your area.  Your local police department, hospital, or clinic for information about shelters and safe homes near you.  Talk to a trusted friend or neighbor, a counselor, or a radha leader. Do not feel that you have to hide what happened.  Teach your children how to call for help in an emergency.  Be alert to warning signs, such as threats, heavy alcohol use, or drug use. This can help you avoid danger.  If you can, make sure that there are no guns or other weapons in your home.  When should you call for help?   Call 911 anytime you think you may need emergency care. For example, call if:    You or someone else has just been abused.     You think you or someone else is in danger of being abused.   Watch closely for changes in your health, and be sure to contact your doctor if you have any problems.  Where can you learn more?  Go to https://www.[employer].net/patiented  Enter G282 in the search box to learn more about \"Intimate Partner Violence: Care Instructions.\"  Current as of: June 25, 2023               Content Version: 13.8    1359-8987 [employer].   Care instructions adapted under license by your healthcare professional. If you have questions about a medical condition or this instruction, always ask your healthcare professional. "[employer] disclaims any warranty or liability for your use " of this information.      Intimate Partner Violence Safety Instructions: Care Instructions  Overview     If you want to save this information but don't think it is safe to take it home, see if a trusted friend can keep it for you. Plan ahead. Know who you can call for help, and memorize the phone number.   Be careful online too. Your online activity may be seen by others. Do not use your personal computer or device to read about this topic. Use a safe computer, such as one at work, a friend's home, or a library.    When you are abused by a spouse or partner, you can take actions to protect yourself and your children.  You can increase your safety whether you decide to stay with your spouse or partner or you decide to leave. You may want to make a safety plan and pack a bag ahead of time. This will help you leave quickly when you decide to. Remember, you cannot change your partner's actions, but you can find help for you and your children. No one deserves to be abused.  Follow-up care is a key part of your treatment and safety. Be sure to make and go to all appointments, and call your doctor if you are having problems. It's also a good idea to know your test results and keep a list of the medicines you take.  How can you care for yourself at home?  Make a plan for your safety   If you decide to stay with your abusive spouse or partner, you can do the following to increase your safety:  Decide what works best to keep you safe in an emergency.  Know who you can call to help you in an emergency.  Decide if you will call the police if you get hurt again. If you can, agree on a signal with your children or neighbor to call the police for you if you need help. You can flash lights or hang something out of a window.  Choose a safe place to go for a short time if you need to leave home. Memorize the address and phone number.  Learn escape routes out of your home in case you need to leave in a hurry. Teach your children  different ways to get out of your home quickly if they need to.  If you can, hide or lock up things that can be used as weapons (guns, knives, hammers).  Learn the number of a domestic violence shelter. Talk to the people there about how they can help.  Find out about other community resources that can help you.  Take pictures of bruises or other injuries if you can. You can also take pictures of things your abuser has broken.  Teach your children that violence is never okay. Tell them that they do not deserve to be hurt.  Pack a bag   Prepare a bag with things you will need if you leave suddenly. Leave it with a friend, a relative, or someone else you trust. You could include the following items in the bag:  A set of keys to your home and car.  Emergency phone numbers and addresses.  Money such as cash or checks. You can also ask a friend, a relative, or someone else you trust to hold money for you.  Copies of legal documents such as house and car titles or rent receipts, birth certificates, Social Security card, voter registration, marriage and 's licenses, and your children's health records.  Personal items you would need for a few days, such as clothes, a toothbrush, toothpaste, and any medicines you or your children need.  A favorite toy or book for your child or children.  Diapers and bottles, if you have very young children.  Pictures that show signs of abuse and violence. You may also add pictures of your abuser.  If you leave   If you decide to leave, you can take the following steps:  Go to the emergency room at a hospital if you have been hurt.  Think about asking the police to be with you as you leave. They can protect you as you leave your home.  If you decide to leave secretly, remember that activities can be tracked. Your abuser may still have access to your cell phone, email, and credit cards. It may be possible for these to be traced. Always be aware of your surroundings.  If this is an  "emergency, do not worry about gathering up anything. Just leave--your safety is most important.  If your abuser moves out, change the locks on the doors. If you have a security system, change the access code.  When should you call for help?   Call 911 anytime you think you may need emergency care. For example, call if:    You or someone else has just been abused.     You think you or someone else is in danger of being abused.   Watch closely for changes in your health, and be sure to contact your doctor if you have any problems.  Where can you learn more?  Go to https://www.Fantasy Buzzer.net/patiented  Enter A752 in the search box to learn more about \"Intimate Partner Violence Safety Instructions: Care Instructions.\"  Current as of: June 25, 2023               Content Version: 13.8    2707-9695 "[employer].   Care instructions adapted under license by your healthcare professional. If you have questions about a medical condition or this instruction, always ask your healthcare professional. "[employer] disclaims any warranty or liability for your use of this information.      Learning About Intimate Partner Violence  What is intimate partner violence?  Intimate partner violence is a type of domestic abuse. It's threatening, emotionally harmful, or violent behavior in a personal relationship. It can happen between past or current partners or spouses. In some relationships both people abuse each other. One partner may be more abusive. Or the abuse may be equal.  Abuse can affect people of any ethnic group, race, or Yazdanism. It can affect teens, adults, or the elderly. And it can happen to people of any sexual orientation, gender, or social status.  Abusers use fear, bullying, and threats to control their partners. They may control what their partners do. They may control where their partners go or who they see. They may act jealous, controlling, or possessive. These early signs of abuse may " happen soon after the start of the relationship. Sometimes it can be hard to notice abuse at first. But after the relationship becomes more serious, the abuse may get worse.  If you are being abused in your relationship, it's important to get help. The abuse is not your fault. You don't have to face it alone.  Be careful  It may not be safe to take home domestic abuse information like this handout. Some people ask a trusted friend to keep it for them. It's also important to plan ahead and to memorize the phone number of places you can go for help. If you are concerned about your safety, do not use your computer, smartphone, or tablet to read about domestic abuse.   What are the types of intimate partner violence?  Abuse can happen in different ways. Each type can happen on its own or in combination with others.  Emotional abuse  Emotional abuse is a pattern of threats, insults, or controlling behavior. It includes verbal abuse. It goes beyond healthy disagreements in a relationship. It's a sign of an unhealthy relationship.  Do you feel threatened, intimidated, or controlled?  Does your partner:  Threaten your children, other family members, or pets?  Use jokes meant to embarrass or shame you?  Call you names?  Tell you that you are a bad parent?  Threaten to take away your children?  Threaten to have you or your family members deported?  Control your access to money or other basic needs?  Control what you do, who you see or talk to, or where you go?  Another form of emotional abuse is denying that it is happening. Or the abuser may act like the abuse is no big deal or is your fault.  Sexual abuse  With sexual abuse, abusers may try to convince or force you to have sex. They may force you into sex acts you're not comfortable with. Or they may sexually assault you. Sexual abuse can happen even if you are in a committed relationship.  Physical abuse  Physical abuse means that a partner hits, kicks, or does something  else to physically hurt you. Physical abuse that starts with a slap might lead to kicking, shoving, and choking over time. The abuser may also threaten to hurt or kill you.  Stalking  Stalking means that an abuser gives you attention that you do not want and that causes you fear. Examples of stalking include:  Following you.  Showing up at places where the abuser isn't invited, such as at your work or school.  Constantly calling or texting you.  What problems can  to?  Intimate partner violence can be very dangerous. It can cause serious, repeated injury. It can even lead to death.  All forms of abuse can cause long-term health problems from the stress of a violent relationship. Verbal abuse can lead to sexual and physical abuse.  Abuse causes:  Emotional pain.  Depression.  Anxiety.  Post-traumatic stress.  Sexual abuse can lead to sexually transmitted infections (such as HIV/AIDS) and unplanned pregnancy.  Pregnancy can be a very dangerous time for people in abusive relationships. Abuse can cause or increase the risk of problems during pregnancy. These include low weight gain, anemia, infections, and bleeding. Abuse may also increase your baby's risk of low birth weight, premature birth, and death.  It can be hard for some victims of abuse to ask for help or to leave their relationship. You may feel scared, stuck, or not sure what steps to take. But it's important not to ignore abuse. Talking to someone you trust could be the first step to ending the abuse and taking care of your own health and happiness again. There are resources available that can help keep you safe.  Where can you get help?  Talk to a trusted friend. Find a local advocacy group, or talk to your doctor about the abuse.  Contact the National Domestic Violence Hotline at 2-465-010-DJCN (1-827.726.1258) for more safety tips. They can guide you to groups in your area that can help. Or go to the National Coalition Against Domestic Violence  "website at www.[employer] to learn more.  Domestic violence groups or a counselor in your area can help you make a safety plan for yourself and your children.  When to call for help  Call 911 anytime you think you may need emergency care. For example, call if:  You think that you or someone you know is in danger of being abused.  You have been hurt and can't have someone safely take you to emergency care.  You have just been abused.  A family member has just been abused.  Where can you learn more?  Go to https://www.Xoopit.net/patiented  Enter S665 in the search box to learn more about \"Learning About Intimate Partner Violence.\"  Current as of: June 25, 2023               Content Version: 13.8    5765-8620 [employer].   Care instructions adapted under license by your healthcare professional. If you have questions about a medical condition or this instruction, always ask your healthcare professional. [employer] disclaims any warranty or liability for your use of this information.      Vaginal Bleeding During Pregnancy: Care Instructions  Overview     It's common to have some vaginal spotting when you are pregnant. In some cases, the bleeding isn't serious. And there aren't any more problems with the pregnancy.  But sometimes bleeding is a sign of a more serious problem. This is more common if the bleeding is heavy or painful. Examples of more serious problems include miscarriage, an ectopic pregnancy, and a problem with the placenta.  You may have to see your doctor again to be sure everything is okay. You may also need more tests to find the cause of the bleeding.  Home treatment may be all you need. But it depends on what is causing the bleeding. Be sure to tell your doctor if you have any new symptoms or if your symptoms get worse.  The doctor has checked you carefully, but problems can develop later. If you notice any problems or new symptoms, get medical treatment right " away.  Follow-up care is a key part of your treatment and safety. Be sure to make and go to all appointments, and call your doctor if you are having problems. It's also a good idea to know your test results and keep a list of the medicines you take.  How can you care for yourself at home?  If your doctor prescribed medicines, take them exactly as directed. Call your doctor if you think you are having a problem with your medicine.  Do not have vaginal sex until your doctor says it's okay.  Do not put anything in your vagina until your doctor says it's okay.  Ask your doctor about other activities you can or can't do.  Get a lot of rest. Being pregnant can make you tired.  Do not use nonsteroidal anti-inflammatory drugs (NSAIDs), such as ibuprofen (Advil, Motrin), naproxen (Aleve), or aspirin, unless your doctor says it is okay.  When should you call for help?   Call 911 anytime you think you may need emergency care. For example, call if:    You passed out (lost consciousness).     You have severe vaginal bleeding. This means you are soaking through a pad each hour for 2 or more hours.     You have sudden, severe pain in your belly or pelvis.   Call your doctor now or seek immediate medical care if:    You have new or worse vaginal bleeding.     You are dizzy or lightheaded, or you feel like you may faint.     You have pain in your belly, pelvis, or lower back.     You think that you are in labor.     You have a sudden release of fluid from your vagina.     You've been having regular contractions for an hour. This means that you've had at least 8 contractions within 1 hour or at least 4 contractions within 20 minutes, even after you change your position and drink fluids.     You notice that your baby has stopped moving or is moving much less than normal.   Watch closely for changes in your health, and be sure to contact your doctor if you have any problems.  Where can you learn more?  Go to  "https://www.Liztic.net/patiented  Enter N829 in the search box to learn more about \"Vaginal Bleeding During Pregnancy: Care Instructions.\"  Current as of: July 11, 2023               Content Version: 13.8    7864-0677 [employer].   Care instructions adapted under license by your healthcare professional. If you have questions about a medical condition or this instruction, always ask your healthcare professional. [employer] disclaims any warranty or liability for your use of this information.      "

## 2024-03-13 NOTE — PROGRESS NOTES
SUBJECTIVE:     HPI:    This is a 33 year old female patient,  who presents for her first obstetrical visit.    NOAM: 2024, by Last Menstrual Period.  She is 6w0d weeks.  Her cycles are regular.  Her last menstrual period was normal.   Since her LMP, she has had no complaints).   She denies nausea, emesis, abdominal pain, headache, loss of appetite, vaginal discharge, dysuria, pelvic pain, urinary urgency, lightheadedness, urinary frequency, vaginal bleeding, hemorrhoids, and constipation.    Additional History: First Pregnancy      Have you travelled during the pregnancy?No  Have your sexual partner(s) travelled during the pregnancy?No      HISTORY:   Planned Pregnancy: Yes  Marital Status:   Occupation:   Living in Household: Spouse    Past History:  Her past medical history   Past Medical History:   Diagnosis Date    History of colonic polyps     dx age 12, multiple removed, benign   .      She has a history of   First Pregnancy    Since her last LMP she denies use of alcohol, tobacco and street drugs.    Past medical, surgical, social and family history were reviewed and updated in [facility].      Current Outpatient Medications   Medication    MAGNESIUM CITRATE PO    Prenatal Vit-Fe Fumarate-FA (PNV PRENATAL PLUS MULTIVITAMIN) 27-1 MG TABS per tablet    vitamin B-Complex    Vitamin D3 (CHOLECALCIFEROL) 25 mcg (1000 units) tablet     No current facility-administered medications for this visit.       ROS:   12 point review of systems negative other than symptoms noted below or in the HPI.  Constitutional: Fatigue      OBJECTIVE:     EXAM:  LMP 2024 (Exact Date)  There is no height or weight on file to calculate BMI.      ASSESSMENT/PLAN:     No diagnosis found.    33 year old , 6w0d weeks of pregnancy with NOAM of 2024, by Last Menstrual Period    Confirmed patient DESIRES delivery at [facility] with the [facility] provider.    Nurse phone  "visit completed. Prenatal book and folder (containing standard educational hand-outs and brochures) will be given to patient. Information in folder reviewed over the phone. Questions answered. Brochure given on optional screening available to assess chromosomal anomalies. Pt desires NIPS. Pt advised to call the clinic if she has any questions or concerns related to her pregnancy. Prenatal labs future ordered. New prenatal visit scheduled with Stephanie Phillips RN on 3/13/2024 at 3:52 PM      No results found for: \"PAP\"    PHQ-9 score:        3/12/2024     8:15 PM   PHQ   PHQ-9 Total Score 3   Q9: Thoughts of better off dead/self-harm past 2 weeks Not at all                   10/19/2023     9:06 PM 11/1/2023     2:07 PM 3/12/2024     8:17 PM   POLA-7 SCORE   Total Score 5 (mild anxiety) 3 (minimal anxiety) 7 (mild anxiety)   Total Score 5 3 7           Patient supplied answers from flow sheet for:  Prenatal OB Questionnaire.  Past Medical History  Have you ever recieved care for your mental health? : (!) Yes  Have you ever been in a major accident or suffered serious trauma?: No  Within the last year, has anyone hit, slapped, kicked or otherwise hurt you?: No  In the last year, has anyone forced you to have sex when you didn't want to?: No    Past Medical History 2   Have you ever received a blood transfusion?: No  Would you accept a blood transfusion if was medically recommended?: Yes  Does anyone in your home smoke?: No   Is your blood type Rh negative?: Unknown  Have you ever ?: No  Have you been hospitalized for a nonsurgical reason excluding normal delivery?: No  Have you ever had an abnormal pap smear?: No    Past Medical History (Continued)  Do you have a history of abnormalities of the uterus?: No  Did your mother take GRACE or any other hormones when she was pregnant with you?: Unknown  Do you have any other problems we have not asked about which you feel may be important to this " pregnancy?: (!) Yes

## 2024-04-01 NOTE — PROGRESS NOTES
SUBJECTIVE:      HPI:     This is a 33 year old female patient,  who presents for her first obstetrical visit.     NOAM: 2024, by Last Menstrual Period.  She is 6w0d weeks.  Her cycles are regular.  Her last menstrual period was normal.   Since her LMP, she has had no complaints).   She denies nausea, emesis, abdominal pain, headache, loss of appetite, vaginal discharge, dysuria, pelvic pain, urinary urgency, lightheadedness, urinary frequency, vaginal bleeding, hemorrhoids, and constipation.     Additional History:   - First Pregnancy  - Hx of GI concerns including stool burden and indigestion  - Hx of anxiety/mood concerns - impacted GI symptoms  - Moving to [place], debating where to deliver      Have you travelled during the pregnancy?No  Have your sexual partner(s) travelled during the pregnancy?No        HISTORY:   Planned Pregnancy: Yes  Marital Status:   Occupation:   Living in Household: Spouse     Past History:  Her past medical history   Past Medical History        Past Medical History:   Diagnosis Date    History of colonic polyps       dx age 12, multiple removed, benign         She has a history of   First Pregnancy     Since her last LMP she denies use of alcohol, tobacco and street drugs.     Past medical, surgical, social and family history were reviewed and updated in [employer].     OB HISTORY  OB History    Para Term  AB Living   1 0 0 0 0 0   SAB IAB Ectopic Multiple Live Births   0 0 0 0 0      # Outcome Date GA Lbr Josh/2nd Weight Sex Delivery Anes PTL Lv   1 Current                PERSONAL HISTORY  Exercise Habits:  aerobic 4-7 days per week.  Employment: Full time.  Her job involves light activity with no potential for toxic exposure.    Travel plans:  are non air travel.   Diet: eats regular meals - high fiber, low dairy  Abuse concerns? No  Hgb A1c screen:  BMI > 30: No, 1st degree family DM: No, History of GDM: No, PCOS: No, High risk ethnicity:  "No           Current Outpatient Medications   Medication    MAGNESIUM CITRATE PO    Prenatal Vit-Fe Fumarate-FA (PNV PRENATAL PLUS MULTIVITAMIN) 27-1 MG TABS per tablet    vitamin B-Complex    Vitamin D3 (CHOLECALCIFEROL) 25 mcg (1000 units) tablet      No current facility-administered medications for this visit.      PAP up to date     PHQ-9 score:         3/12/2024     8:15 PM   PHQ   PHQ-9 Total Score 3   Q9: Thoughts of better off dead/self-harm past 2 weeks Not at all               10/19/2023     9:06 PM 2023     2:07 PM 3/12/2024     8:17 PM   POLA-7 SCORE   Total Score 5 (mild anxiety) 3 (minimal anxiety) 7 (mild anxiety)   Total Score 5 3 7          ROS:   12 point review of systems negative other than symptoms noted below or in the HPI.  Constitutional: Fatigue          OBJECTIVE:     EXAM:  /62   Ht 1.707 m (5' 7.2\")   Wt 83.4 kg (183 lb 12.8 oz)   LMP 2024 (Exact Date)   BMI 28.62 kg/m   Body mass index is 28.62 kg/m .    GENERAL: alert and no distress  RESP: unlabored breathing  CV: well perfused  ABDOMEN: soft, nontender, no hepatosplenomegaly, no masses and bowel sounds normal  SKIN: no suspicious lesions or rashes to visualized skin  NEURO: Normal strength and tone, mentation intact and speech normal  PSYCH: mentation appears normal, affect normal/bright    ASSESSMENT/PLAN:       ICD-10-CM    1. Encounter for supervision of normal first pregnancy in first trimester  Z34.01 Urine Culture Aerobic Bacterial      2. History of anxiety state  Z91.89       3. History of indigestion  Z87.19       4. BMI 28.0-28.9,adult  Z68.28           33 year old , On 2024 patient is 8w5d weeks of pregnancy with NOAM of 2024, by Last Menstrual Period    Discussed as follows:  - Dating: confirmed by early ultrasound. EDC is 2024. Ultrasound results reviewed in clinic today.  - Genetic screening: NIPS desired by pt, consent and requisition forms signed; ordered. Does NOT want to know " sex. Declines carrier screening today, will review more at home.  - Mood is stable.   - Vaccines: planning flu/covid boosters next fall  - Does not meet criteria for ASA recommendation  - Does not meet criteria for MFM referral    COUNSELING  Instructed on use of triage nurse line and contacting the on call CNM after hours in an emergency.   Symptoms of N&V and fatigue usually start to resolve around 12-16 weeks   Reviewed CNM philosophy, call schedule for labor and delivery, and FSH for delivery  1st OB handout given outlining appointment spacing and CNM information  Reviewed exercise and nutrition  Recommend to gain 15-25 pounds with her pregnancy.  Discussed OTC medications. OB med list given  Encouraged patient to take PNV's/DHA  Travel precautions discussed, no air travel after 36 weeks and COVID recommendations discussed  Will notify patient with lab results when available    F/U to be addressed next visit:  lab results, delivery location    Will return to the clinic in 4 weeks for her next routine prenatal check.  Will call to be seen sooner if problems arise.    Stephanie Bowers, MARIA D CNM    50 minutes spent by me on the date of the encounter doing chart review, history and exam, documentation and further activities per the note

## 2024-04-02 ENCOUNTER — ANCILLARY PROCEDURE (OUTPATIENT)
Dept: ULTRASOUND IMAGING | Facility: CLINIC | Age: 34
End: 2024-04-02
Payer: COMMERCIAL

## 2024-04-02 ENCOUNTER — PRENATAL OFFICE VISIT (OUTPATIENT)
Dept: MIDWIFE SERVICES | Facility: CLINIC | Age: 34
End: 2024-04-02
Payer: COMMERCIAL

## 2024-04-02 VITALS
SYSTOLIC BLOOD PRESSURE: 122 MMHG | DIASTOLIC BLOOD PRESSURE: 62 MMHG | HEIGHT: 67 IN | WEIGHT: 183.8 LBS | BODY MASS INDEX: 28.85 KG/M2

## 2024-04-02 DIAGNOSIS — Z91.89 HISTORY OF ANXIETY STATE: ICD-10-CM

## 2024-04-02 DIAGNOSIS — Z87.19 HISTORY OF INDIGESTION: ICD-10-CM

## 2024-04-02 DIAGNOSIS — O36.80X0 PREGNANCY WITH INCONCLUSIVE FETAL VIABILITY: ICD-10-CM

## 2024-04-02 DIAGNOSIS — Z34.01 ENCOUNTER FOR SUPERVISION OF NORMAL FIRST PREGNANCY IN FIRST TRIMESTER: Primary | ICD-10-CM

## 2024-04-02 DIAGNOSIS — Z34.01 ENCOUNTER FOR SUPERVISION OF NORMAL FIRST PREGNANCY IN FIRST TRIMESTER: ICD-10-CM

## 2024-04-02 PROBLEM — Z86.59 HISTORY OF ANXIETY STATE: Status: ACTIVE | Noted: 2024-04-02

## 2024-04-02 PROBLEM — N92.6 IRREGULAR MENSTRUAL CYCLE: Status: ACTIVE | Noted: 2017-03-07

## 2024-04-02 PROBLEM — E78.5 HYPERLIPIDEMIA, UNSPECIFIED HYPERLIPIDEMIA TYPE: Status: ACTIVE | Noted: 2019-03-18

## 2024-04-02 PROBLEM — N92.6 IRREGULAR MENSTRUAL CYCLE: Status: RESOLVED | Noted: 2017-03-07 | Resolved: 2024-04-02

## 2024-04-02 LAB
ALBUMIN UR-MCNC: NEGATIVE MG/DL
APPEARANCE UR: ABNORMAL
BACTERIA #/AREA URNS HPF: ABNORMAL /HPF
BILIRUB UR QL STRIP: NEGATIVE
COLOR UR AUTO: YELLOW
GLUCOSE UR STRIP-MCNC: NEGATIVE MG/DL
HGB UR QL STRIP: NEGATIVE
KETONES UR STRIP-MCNC: NEGATIVE MG/DL
LEUKOCYTE ESTERASE UR QL STRIP: ABNORMAL
NITRATE UR QL: NEGATIVE
PH UR STRIP: 7 [PH] (ref 5–7)
RBC #/AREA URNS AUTO: ABNORMAL /HPF
SP GR UR STRIP: 1.01 (ref 1–1.03)
SQUAMOUS #/AREA URNS AUTO: ABNORMAL /LPF
UROBILINOGEN UR STRIP-ACNC: 0.2 E.U./DL
WBC #/AREA URNS AUTO: ABNORMAL /HPF

## 2024-04-02 PROCEDURE — 81001 URINALYSIS AUTO W/SCOPE: CPT

## 2024-04-02 PROCEDURE — 87086 URINE CULTURE/COLONY COUNT: CPT

## 2024-04-02 PROCEDURE — 76817 TRANSVAGINAL US OBSTETRIC: CPT | Performed by: OBSTETRICS & GYNECOLOGY

## 2024-04-02 PROCEDURE — 99215 OFFICE O/P EST HI 40 MIN: CPT

## 2024-04-02 NOTE — PATIENT INSTRUCTIONS
Thank you for coming to see the Midwives at the   [facility]!    Please take prenatal vitamin with DHA and vitamin D3 2,000-3,000 international unit(s) once daily during the entire pregnancy.    We will notify you about your labs that were drawn today once we get the results back.  If you have MyChart your lab results will be posted there.    Someone from the clinic will send you a [employer] message or call you personally with your results.    If you need any refills of medications please call your pharmacy and they will contact us.    If you have a medical emergency please call 911.    If you have any concerns about today's visit, wish to schedule another appointment, or have an urgent medical concern please call our office at [phone]. You can also make appointments through [employer].    After hours you may also call the clinic number above to be connected with [place]'s after hours triage nurse.  The nurse can page the midwife on call if needed. There is always a midwife on call 24 hours a day.    Prenatal Care Recommendations:    Before 14 weeks: Dating ultrasound, genetic testing       This ultrasound helps us determine your dates accurately. Innatal (genetic screening test) can be drawn anytime after 10 weeks of gestation.    16 weeks: Optional genetic testing single AFP       This testing helps understand your baby's risk for some genetic abnormalities.    18-22 weeks:  Screening anatomy ultrasound       This testing will look for early growth abnormalities, placenta location, and may tell the baby's gender if you wish to find out.    24-27 weeks: One hour diabetes test (GCT) and complete blood count       This test helps identify diabetes of pregnancy or gestational diabetes.  We also look   at the iron in your blood and how well your blood clots.    28 - 36 weeks: Tetanus shot (Tdap)       This shot helps protect you and your baby from whooping cough.    36 weeks and later: Group B  Strep test (GBS)       This test helps predict if you need antibiotics in labor to prevent infection for your baby.    Anytime September to April:  Flu shot       This shot helps protect you and your family from the flu.  This is especially important during pregnancy.        The typical schedule after your first visit today you can expect:     Visit 2 - 12-16 weeks  Visit 3 - 20 weeks  Visit 4 - 24 weeks  Visit 5 - 28 weeks  Visit 6 - 30 weeks  Visit 7 - 32 weeks  Visit 8 - 34 weeks  Visit 9 - 36 weeks  Weekly after 36 weeks until delivery.        Any time during or after your pregnancy you may experience increased depression and/or mood changes.    We are here to support you. Please contact us if you are:  Feeling anxious  Overwhelmed or sad   Trouble sleeping  Crying uncontrollably  Trouble caring for yourself or baby.  Any thoughts of hurting yourself, your baby, or anyone else    If anything comes up between your visits or you have concerns please don't hesitate to contact us.    Secure access to your medical record:  Use [employer] (secure email communication and access to your chart) to send your primary care provider a message or make an appointment. Ask someone on your Team how to sign up for [employer]. To log on to [employer] or for more information in [employer] please visit the website at [email or web address].       Certified Nurse Midwife (CNM) Team    SALLY Rosales, SALLY KILLIAN, SALLY, [facility]-BC  Sharita Bloom DNP, APRN, SALLY Zheng DNP, APRLUCIUS, SALLY Bowers DNP, MARIA D, ASIMM    Link to Midwifery Care website: [email or web address]      Again, thank you for choosing the midwives at [facility].  We are excited to be a part of your pregnancy! Please let us know how we can best partner with you to improve your and your family's health.    Remedies for nausea and vomiting with pregnancy    Eat small  frequent meals every 2-3 hours if possible.     Avoid food at extremes of temperature and drinks with carbonation.    Eat foods that appeal to you, avoiding fats and spicy foods.    Avoid liquids with foods.  Drink liquids 30-60 minutes before or after eating and sip slowly.    Bread, pasta, crackers, potatoes, and rice tend to be tolerated the best.    Don't worry about what you eat in the first 3 months, it is more important that you can eat and keep it down.     Try flat ginger ale or ginger tea    Before rising in the morning, eat a small amount of crackers or dry toast.    Peppermint tea    Ginger is a herbal remedy for nausea and you can use it in any form.  There are ginger tablets you can purchase.  The dose 1000 mg a day in divided doses.     You may also try doxylamine (Unisom) 12.5 mg three times a day which is a sleeping medication along with Vitamin B6 25 mg three times a day.  This combination takes up to a week to work so give it some time.     Benadryl (diphenhydramine) 25-50 mg every 8 hour or Dramamine (dimenhydrinate)  mg by mouth every 4-6 hours. Both of these medication may cause some drowsiness    Other things that may help include an Accupressure band and acupuncture    If these methods fail there are many prescriptions that we can try    If you begin to vomit more than 5 or 6 times a day and feel that you are unable to keep anything down, call the [facility] at [phone]    Fish Safety During Pregnancy    Often time's women worry about eating fish during pregnancy. Fish can be totally safe to eat during pregnancy.However, some fish may contain contaminants that could harm you or your baby if you eat certain types of fish or eat fish too often. Below is a list of which types of fish to eat, how often to eat fish, and which types of fish to avoid while pregnant.    Reasons to eat fish:  Fish is a great source of protein, vitamins, and minerals.  The oils found in  fish are important to unborn and breast fed babies  Eating fish may play a role in the prevention of heart disease in adults       Fish to EAT while pregnant   2 servings per week of any of these types of fish    Catfish (farm raised)  Cod    Crab    Brandt    Flatfish   Oysters    [place]   [place] ([place], not [place])     Sardines   Scallops    Shrimp   Tilapia   1 serving per week of any of these fish    Canned LIGHT tuna     [place] caught-        [place]   [place]    Yellow Perch   1 serving per MONTH of these types of fish    Canned WHITE tuna  Iranian Sea Brown    Grouper   Halibut    [place]    [place]roll Roughy    Tuna steak   [place] caught-    Bass    Catfish    [place] smaller than 20 inches    [place] smaller than 30 inches       Servings of fish should be based on your weight, 1 oz for every 20 lbs of body weight. For example: 130 lb person can safely eat 7 oz     150 lb person can safely eat 8 oz     170 lb person can safely eat 9 oz    Make sure to space out meals with fish throughout the month, don't eat all your fish meals for the month within a few days.       Fish to Avoid while Pregnant:      Shark   Mike Mackerel    Swordfish  Raw Sushi-any type of fish    Tile fish         [place] Caught:      [place] longer than 20 in    [place] longer than 30 in    Musky      Contaminants:    Mercury comes from air pollution and small amounts of mercury can damage the brain that is just starting to form or grow. Too much mercury may affect a child's behavior and lead to learning problems later in life. Too much mercury in adults and older children may cause tingling, numbness in hands and feet, or vision changes  PCBs a man-made substance once used in electrical transformers but was banned in 1967 although it can still be found in the [place]. A baby who are exposed to PCBs during pregnancy may have a lower birth weight, reduced head size, and delayed physical  development. Exposure to PCBs may also cause cancer  PFOS is a man-made chemical to make products that resist heat, oil, stains, and grease. Studies in lab animals exposed to low levels of PFOS showed decreas HDL (good cholesterol) and changes in thyroid hormone levels. The concern about PFOS is with long-term exposure such as consuming large amounts over a long period of time   Mercury and PFOS cannot be removed through cooking or cleaning. By removing fat when cleaning and cooking you can reduce PCBs.      For more information and up to date information about fish safety in [place] please contact the [place] Department of Health ([facility]) or the Department of Natural Resources (DNR):    www.health.[facility].mn.  DNR: [phone]  [facility]: [phone]

## 2024-04-03 LAB — BACTERIA UR CULT: NO GROWTH

## 2024-04-10 LAB
ABO/RH(D): NORMAL
ANTIBODY SCREEN: NEGATIVE
SPECIMEN EXPIRATION DATE: NORMAL

## 2024-04-11 ENCOUNTER — LAB (OUTPATIENT)
Dept: LAB | Facility: CLINIC | Age: 34
End: 2024-04-11
Payer: COMMERCIAL

## 2024-04-11 DIAGNOSIS — Z34.01 ENCOUNTER FOR SUPERVISION OF NORMAL FIRST PREGNANCY IN FIRST TRIMESTER: ICD-10-CM

## 2024-04-11 LAB
ERYTHROCYTE [DISTWIDTH] IN BLOOD BY AUTOMATED COUNT: 12.1 % (ref 10–15)
HCT VFR BLD AUTO: 33.3 % (ref 35–47)
HGB BLD-MCNC: 11.4 G/DL (ref 11.7–15.7)
MCH RBC QN AUTO: 29 PG (ref 26.5–33)
MCHC RBC AUTO-ENTMCNC: 34.2 G/DL (ref 31.5–36.5)
MCV RBC AUTO: 85 FL (ref 78–100)
PLATELET # BLD AUTO: 268 10E3/UL (ref 150–450)
RBC # BLD AUTO: 3.93 10E6/UL (ref 3.8–5.2)
WBC # BLD AUTO: 9.1 10E3/UL (ref 4–11)

## 2024-04-11 PROCEDURE — 86850 RBC ANTIBODY SCREEN: CPT

## 2024-04-11 PROCEDURE — 36415 COLL VENOUS BLD VENIPUNCTURE: CPT

## 2024-04-11 PROCEDURE — 87340 HEPATITIS B SURFACE AG IA: CPT

## 2024-04-11 PROCEDURE — 86803 HEPATITIS C AB TEST: CPT

## 2024-04-11 PROCEDURE — 87389 HIV-1 AG W/HIV-1&-2 AB AG IA: CPT

## 2024-04-11 PROCEDURE — 86901 BLOOD TYPING SEROLOGIC RH(D): CPT

## 2024-04-11 PROCEDURE — 86762 RUBELLA ANTIBODY: CPT

## 2024-04-11 PROCEDURE — 85027 COMPLETE CBC AUTOMATED: CPT

## 2024-04-11 PROCEDURE — 86900 BLOOD TYPING SEROLOGIC ABO: CPT

## 2024-04-11 PROCEDURE — 86780 TREPONEMA PALLIDUM: CPT

## 2024-04-12 LAB
HBV SURFACE AG SERPL QL IA: NONREACTIVE
HCV AB SERPL QL IA: NONREACTIVE
HIV 1+2 AB+HIV1 P24 AG SERPL QL IA: NONREACTIVE
RUBV IGG SERPL QL IA: 6.12 INDEX
RUBV IGG SERPL QL IA: POSITIVE
T PALLIDUM AB SER QL: NONREACTIVE

## 2024-04-25 ENCOUNTER — PRENATAL OFFICE VISIT (OUTPATIENT)
Dept: MIDWIFE SERVICES | Facility: CLINIC | Age: 34
End: 2024-04-25
Payer: COMMERCIAL

## 2024-04-25 VITALS — BODY MASS INDEX: 29.64 KG/M2 | DIASTOLIC BLOOD PRESSURE: 68 MMHG | WEIGHT: 190.4 LBS | SYSTOLIC BLOOD PRESSURE: 122 MMHG

## 2024-04-25 DIAGNOSIS — Z91.89 HISTORY OF ANXIETY STATE: Primary | ICD-10-CM

## 2024-04-25 DIAGNOSIS — Z34.01 ENCOUNTER FOR SUPERVISION OF NORMAL FIRST PREGNANCY IN FIRST TRIMESTER: ICD-10-CM

## 2024-04-25 DIAGNOSIS — Z36.89 ENCOUNTER FOR FETAL ANATOMIC SURVEY: ICD-10-CM

## 2024-04-25 LAB — SCANNED LAB RESULT: NORMAL

## 2024-04-25 PROCEDURE — 99207 PR PRENATAL VISIT: CPT | Performed by: ADVANCED PRACTICE MIDWIFE

## 2024-04-25 NOTE — PROGRESS NOTES
12w1d  Patient feels well overall. Here with Junito   Patient has not had nausea and has not had vomiting  Educated about diet, exercise and normal weight gain  Normal to feel movement between 18-22 weeks  Reviewed labs from 1st OB- WNL. Rh negative.   Discussed genetic screening; patient has had -NIPS negative   Warning signs discussed    Return to clinic 4 weeks    MARIA D Boucher, CNM

## 2024-05-22 NOTE — PROGRESS NOTES
16w1d   Feels well overall. Here Junito today.   Fetal movement No  Denies loss of fluid/vb/contractions/pelvic pain  Depression screening done  Declined AFP today  Declined GC/Chlamydia urine collection today  Anatomy ultrasound next visit between 19-21 weeks  Return to clinic 4 weeks    Sharita Bloom, DNP, APRN, CNM

## 2024-05-22 NOTE — PATIENT INSTRUCTIONS
Childbirth Education    There are many options for childbirth education in the [place] area. Additionally, there are many online options in other areas in the [place], so please if you find great classes, please let us know!    [place] is a boutique type center that is know for its prenatal/  yoga and community like setting. They offer great childbirth education. They are expensive, but people who go there love it. www.[email or web address]    [facility]: Yani offers well-rounded education including preparing for childbirth, breastfeeding, and even baby CPR. [email or web address]    [facility]: Birth offers similar classes as [place] at similar prices. They focus more on Lamaze style childbirth techniques. www.birthedmn.com    [facility]: I cannot recommend this place enough!!! [employer] mission is to provide high quality prenatal/  education to all [facility] residents. They provide  support, prenatal yoga, childbirth and breastfeeding education. They are a nonprofit organization. For people with Medicaid health care plans, most services are free. But even if you have private insurance, the costs are very reasonable. Plus, the money is funneled back into amazing childbirth education for all! www.everyday-miracles.org    [facility]: [facility] offers free parent topic sessions that occur virtually. These sessions are not comprehensive but can give small doses of education. www.childbirthcollective.org      Weeks 14 to 18 of Your Pregnancy: Care Instructions  Around this time, you may start to look pregnant. Your baby is now able to pass urine. And the first stool (meconium) is starting to collect in your baby's intestines. Hair is starting to grow on your baby's head.    You may notice some skin changes, such as itchy spots on your palms or acne on your face.    At your next doctor visit, you may have an ultrasound. So you might think about whether you want to know the  "sex of your baby. Also ask your doctor about flu and COVID-19 shots.     How to reduce stress   Ask for help when you need it.  Try to avoid things that cause you stress.  Seek out things that relieve stress, such as breathing exercises or yoga.     How to get exercise   If you don't usually exercise, start slowly. Short walks may be a good choice.  Try to be active 30 minutes a day, at least 5 days a week.  Avoid activities where you're more likely to fall.  Use light weights to reduce stress on your joints.     How to stay at a healthy weight for you   Talk to your doctor or midwife about how much weight you should gain.  It's generally best to gain:  About 28 to 40 pounds if you're underweight.  About 25 to 35 pounds if you're at a healthy weight.  About 15 to 25 pounds if you're overweight.  About 11 to 20 pounds if you're very overweight (obese).  Follow-up care is a key part of your treatment and safety. Be sure to make and go to all appointments, and call your doctor if you are having problems. It's also a good idea to know your test results and keep a list of the medicines you take.  Where can you learn more?  Go to https://www."[employer]net/patiented  Enter I453 in the search box to learn more about \"Weeks 14 to 18 of Your Pregnancy: Care Instructions.\"  Current as of: July 10, 2023               Content Version: 14.0    3925-5222 [employer].   Care instructions adapted under license by your healthcare professional. If you have questions about a medical condition or this instruction, always ask your healthcare professional. [employer] disclaims any warranty or liability for your use of this information.      Second-Trimester Fetal Ultrasound: About This Test  What is it?     Fetal ultrasound is a test that uses sound waves to make pictures of your baby (fetus) and placenta inside the uterus. The test is the safest way to find out the age, size, and position of your baby. You " "also may be able to find out the sex of your baby. (But the test isn't done just to find out a baby's sex.)  No known risks to the mother or the baby are linked to fetal ultrasound. But you may feel anxious if the test reveals a problem with your pregnancy or baby.  Why is this test done?  In the second trimester, a fetal ultrasound is done to:  Estimate the number of weeks and days a fetus has developed since the beginning of the pregnancy. This is called the gestational age.  Look at the size and position of the fetus, the placenta, and the fluid that surrounds the fetus.  Find major birth defects, such as heart problems or problems with the brain and spinal cord (neural tube defects). But the test may not be able to find many minor defects and some major birth defects.  How do you prepare for the test?  In general, there's nothing you have to do before this test, unless your doctor tells you to.  How is the test done?  You may be able to leave your clothes on, or you will be given a gown to wear.  You will lie on your back on a padded examination table.  A gel will be spread on your belly. It will be removed after the test.  A small, handheld device called a transducer will be pressed against the gel on your skin and moved across your belly several times.  You may watch the monitor to see the picture of your baby during the test.  What happens after the test?  You will probably be able to go home right away.  You most likely will be able to go back to your usual activities right away.  Follow-up care is a key part of your treatment and safety. Be sure to make and go to all appointments, and call your doctor if you are having problems. It's also a good idea to keep a list of the medicines you take. Ask your doctor when you can expect to have your test results.  Where can you learn more?  Go to https://www.healthwise.net/patiented  Enter Y671 in the search box to learn more about \"Second-Trimester Fetal Ultrasound: " "About This Test.\"  Current as of: July 10, 2023               Content Version: 14.0    0336-2208 [employer].   Care instructions adapted under license by your healthcare professional. If you have questions about a medical condition or this instruction, always ask your healthcare professional. [employer] disclaims any warranty or liability for your use of this information.      During Pregnancy: Exercises  Introduction  Here are some examples of exercises to do during your pregnancy. Start each exercise slowly. Ease off the exercise if you start to have pain.  Talk to your doctor about when you can start these exercises and which ones will work best for you.  How to do the exercises  Neck rotation    Sit up straight in a firm chair, or stand up straight. If you're standing, keep your feet about hip-width apart.  Keeping your chin level, turn your head to the right and hold for 15 to 30 seconds.  Turn your head to the left and hold for 15 to 30 seconds.  Repeat 2 to 4 times.  Neck stretch to the front    Sit up straight in a firm chair, or stand up straight. Look straight ahead. If you're standing, keep your feet about hip-width apart.  Slowly bend your head forward without moving your shoulders.  Hold for 15 to 30 seconds, then return to your starting position.  Repeat 2 to 4 times.  Back press    Stand with your back 10 to 12 inches away from a wall.  Lean into the wall until your back is against it. Press your lower back against the wall by pulling in your stomach muscles.  Slowly slide down until your knees are slightly bent, pressing your lower back against the wall.  Hold for at least 6 seconds, then slide back up the wall.  Repeat 8 to 12 times.  Over time, work up to holding this position for as much as 1 minute.  Trunk twist    Sit on the floor with your legs crossed. If that's not comfortable, you can sit on a folded blanket so your bottom is a few inches off the floor. Or you can " sit on a chair with your knees hip-width apart and your feet flat on the floor.  Reach your left hand toward your right knee. You can place your right hand at your side for support.  Slowly twist your body (trunk) to your right.  Relax and return to your starting position.  Repeat 2 to 4 times.  Switch your hands and twist to your left.  Repeat 2 to 4 times.  Pelvic rocking on hands and knees    Start on your hands and knees. Place your wrists directly below your shoulders and your knees below your hips.  Breathe in slowly. Tuck your head downward and round your back up, making a curve with your back in the shape of the letter C. Hold this position for about 6 seconds.  Breathe out slowly and bring your head back up. Relax, keeping your back straight. (Don't allow it to curve toward the floor.) Hold for about 6 seconds.  Repeat 8 to 12 times, gently rocking your pelvis.  Pelvic tilt    Lie on your back with your knees bent and your feet flat on the floor.  Tighten your belly muscles by pulling your belly button in toward your spine. Press your lower back to the floor. You should feel your hips and pelvis rock back.  Hold for 6 seconds while breathing smoothly, and then relax.  Repeat 8 to 12 times.  Do this exercise only during the first 4 months of pregnancy. After this point, lying on your back is not recommended, because it can cause blood flow problems for you and your baby.  Backward stretch    Start on your hands and knees with your knees 8 to 10 inches apart, hands directly below your shoulders, and arms and back straight.  Keeping your arms straight, slowly lower your buttocks toward your heels and tuck your head toward your knees. Hold for 15 to 30 seconds.  Slowly return to the starting position.  Repeat 2 to 4 times.  Forward bend    Sit comfortably in a chair, with your arms relaxed.  Slowly bend forward, allowing your arms to hang down. Lean only as far as you can without feeling discomfort or pressure  on your belly.  Hold for 15 to 30 seconds and then slowly sit up straight.  Repeat 2 to 4 times or to your comfort level.  Donkey kick    Start on your hands and knees. Place your hands directly below your shoulders, and keep your arms straight.  Tighten your belly muscles by pulling your belly button in toward your spine. Keep breathing normally, and don't hold your breath.  Lift one knee and bring it toward your elbow.  Slowly extend that leg behind you without completely straightening it. Be careful not to let your hip drop down. Avoid arching your back.  Hold your leg behind you for about 6 seconds.  Return to your starting position.  Repeat 8 to 12 times for each leg.  Tailor sitting    Sit on the floor.  Bring your feet close to your body while crossing your ankles.  Keep your back straight. Relax your legs and let your knees drop toward the floor.  Hold this position for as long as you are comfortable.  Toe reach    Sit on the floor with your back straight, legs about 12 inches apart, and feet relaxed outward.  Stretch your hands forward toward your right foot, then sit up.  Stretch your hands straight forward, then sit up.  Stretch your hands forward toward your left foot, then sit up.  Hold each stretch for 15 to 30 seconds.  Repeat 2 to 4 times.  Follow-up care is a key part of your treatment and safety. Be sure to make and go to all appointments, and call your doctor if you are having problems. It's also a good idea to know your test results and keep a list of the medicines you take.  Current as of: July 10, 2023               Content Version: 14.0    8872-5820 [employer].   Care instructions adapted under license by your healthcare professional. If you have questions about a medical condition or this instruction, always ask your healthcare professional. [employer] disclaims any warranty or liability for your use of this information.

## 2024-05-23 ENCOUNTER — PRENATAL OFFICE VISIT (OUTPATIENT)
Dept: MIDWIFE SERVICES | Facility: CLINIC | Age: 34
End: 2024-05-23
Payer: COMMERCIAL

## 2024-05-23 DIAGNOSIS — Z34.02 ENCOUNTER FOR SUPERVISION OF NORMAL FIRST PREGNANCY IN SECOND TRIMESTER: Primary | ICD-10-CM

## 2024-05-23 DIAGNOSIS — Z36.89 ENCOUNTER FOR FETAL ANATOMIC SURVEY: ICD-10-CM

## 2024-05-23 PROCEDURE — 99207 PR PRENATAL VISIT: CPT | Performed by: ADVANCED PRACTICE MIDWIFE

## 2024-05-23 RX ORDER — BERBERINE CHLOR/SEAWEED/CHROM 500-250 MG
CAPSULE ORAL
COMMUNITY

## 2024-05-23 RX ORDER — CALCIUM CARBONATE 500 MG/1
1 TABLET, CHEWABLE ORAL 2 TIMES DAILY
COMMUNITY

## 2024-06-16 ENCOUNTER — HEALTH MAINTENANCE LETTER (OUTPATIENT)
Age: 34
End: 2024-06-16

## 2024-06-20 ENCOUNTER — PRENATAL OFFICE VISIT (OUTPATIENT)
Dept: MIDWIFE SERVICES | Facility: CLINIC | Age: 34
End: 2024-06-20
Attending: ADVANCED PRACTICE MIDWIFE
Payer: COMMERCIAL

## 2024-06-20 ENCOUNTER — ANCILLARY PROCEDURE (OUTPATIENT)
Dept: ULTRASOUND IMAGING | Facility: CLINIC | Age: 34
End: 2024-06-20
Attending: ADVANCED PRACTICE MIDWIFE
Payer: COMMERCIAL

## 2024-06-20 VITALS — DIASTOLIC BLOOD PRESSURE: 68 MMHG | SYSTOLIC BLOOD PRESSURE: 122 MMHG | BODY MASS INDEX: 30.67 KG/M2 | WEIGHT: 197 LBS

## 2024-06-20 DIAGNOSIS — Z91.89 HISTORY OF ANXIETY STATE: ICD-10-CM

## 2024-06-20 DIAGNOSIS — Z36.89 ENCOUNTER FOR FETAL ANATOMIC SURVEY: ICD-10-CM

## 2024-06-20 DIAGNOSIS — Z3A.20 20 WEEKS GESTATION OF PREGNANCY: ICD-10-CM

## 2024-06-20 DIAGNOSIS — Z34.02 ENCOUNTER FOR SUPERVISION OF NORMAL FIRST PREGNANCY IN SECOND TRIMESTER: Primary | ICD-10-CM

## 2024-06-20 PROCEDURE — 76805 OB US >/= 14 WKS SNGL FETUS: CPT | Performed by: OBSTETRICS & GYNECOLOGY

## 2024-06-20 PROCEDURE — 99207 PR PRENATAL VISIT: CPT

## 2024-06-20 NOTE — PROGRESS NOTES
20w1d    Feels well overall. Is having increased cramping with bowel movements. Trying to stay on top of bowel regimen, but has had a variable schedule the past few weeks. Denies bleeding with Bms. BM q1-2 days.     Fetal movement: negative - not feeling yet  Denies loss of fluid/vb/contractions    Anatomy ultrasound results discussed; to be reviewed by MD, having a surprise!, Placenta:  posterior    GCT visit between 24-28 weeks, handout provided, reminded of longer appointment    Round ligament pain and comfort measures reviewed  Warning signs reviewed, all questions answered.  Return to clinic 4 weeks    MARIA D Banerjee, CNM

## 2024-06-20 NOTE — PATIENT INSTRUCTIONS

## 2024-07-16 NOTE — PROGRESS NOTES
24w1d   Feels good overall, here alone today. Swelling in both ankles, no pain but uncomfortable. Has been on the go a lot, and notices that the swelling increases at this time. Has compression socks which help  Sometimes has crampy feeling in lower pelvis; mostly when she goes for walk. Some feels cramping pain if she has to use the bathroom; residual pain lasts for a little bit after defecation, but then feels better.    Fetal movement: positive   Denies loss of fluid/vb/contractions, signs and symptoms preeclampsia  GCT/CBC today  Tdap next visit; reviewed CDC recommendations and partner/family vaccination recommended as well  Water birth discussed, patient is somewhat interested, handout given.   Need for Rhogam? Yes, to be done next visit     Counseled on signs and symptoms of PTL, preeclampsia, discussed fetal movement awareness  Recommend continuing to use compression socks to help with swelling, stay hydrated. Try elevating legs.    Reviewed need for Rhogam.     Return to clinic 4 weeks    BJ Cates       [facility]    I was present with the PA student who participated in the documentation of the services provided. I have verified the history and personally performed the physical exam and medical decision making, as documented by the student and edited by me.         07/18/24       Eri KILLIAN CNM

## 2024-07-16 NOTE — PATIENT INSTRUCTIONS
"\"I hope you had a positive experience and that you can definitely recommend [facility] to your family and friends. You ll be receiving a survey soon and I would look forward to hearing your feedback\".  Weeks 22 to 26 of Your Pregnancy: Care Instructions  Your baby's lungs are getting ready for breathing. Your baby may respond to your voice. Your baby likely turns less, and kicks or jerks more. Jerking may mean that your baby has hiccups.    Think about taking childbirth classes. And start to think about whether you want to have pain medicine during labor.    At your next doctor visit, you may be tested for anemia and for high blood sugar that first occurs during pregnancy (gestational diabetes). These conditions can cause problems for you and your baby.    To ease discomfort, such as back pain    Change your position often. Try not to sit or stand for too long.  Get some exercise. Things like walking or stretching may help.  Try using a heating pad or cold pack.    To ease or reduce swelling in your feet, ankles, hands, and fingers    Take off your rings.  Avoid high-sodium foods, such as potato chips.  Prop up your feet, and sleep with pillows under your feet.  Try to avoid standing for long periods of time.  Do not wear tight shoes.  Wear support stockings.  Kegel exercises to prevent urine from leaking    Squeeze your muscles as if you were trying not to pass gas. Your belly, legs, and buttocks shouldn't move. Hold the squeeze for 3 seconds, then relax for 5 to 10 seconds.    Add 1 second each week until you can squeeze for 10 seconds. Repeat the exercise 10 times a session. Do 3 to 8 sessions a day. If these exercises cause you pain, stop doing them and talk with your doctor.  Follow-up care is a key part of your treatment and safety. Be sure to make and go to all appointments, and call your doctor if you are having problems. It's also a good idea to know your test results and keep a list of the " "medicines you take.  Where can you learn more?  Go to https://www.[email or web address].net/patiented  Enter G264 in the search box to learn more about \"Weeks 22 to 26 of Your Pregnancy: Care Instructions.\"  Current as of: July 10, 2023               Content Version: 14.0    7269-4790 [employer].   Care instructions adapted under license by your healthcare professional. If you have questions about a medical condition or this instruction, always ask your healthcare professional. [employer] disclaims any warranty or liability for your use of this information.      Learning About Screening for Gestational Diabetes  What is gestational diabetes screening?     Screening for gestational diabetes is a way to look for high blood sugar during pregnancy. You drink some very sweet liquid. Then you have a blood test to see how your body uses sugar (glucose).  How is gestational diabetes screening done?  Screening for gestational diabetes may be done in a couple of ways.  Two-part screening.  Part one (glucose challenge test): A blood sample is taken after you drink a liquid that contains sugar (glucose). You don't need to stop eating or drinking before this test. If the test shows that you don't have a lot of sugar in your blood, you don't have gestational diabetes.  Part two (oral glucose tolerance test, or OGTT): If the first test shows a lot of sugar in your blood, then you may have an OGTT. You can't eat or drink for at least 8 hours before this test. A blood sample is taken, then you drink a sweet liquid. You have more blood tests after 1 to 3 hours. If the OGTT shows that you have a lot of sugar in your blood, you may have gestational diabetes.  One-part screening.  Sometimes doctors use the OGTT on its own. If the test shows that you don't have a lot of sugar in your blood, you don't have gestational diabetes. If you do have a lot of sugar in your blood, you may have the condition.  What are the risks of " "screening?  Your blood glucose level may drop very low toward the end of the test. If this happens, you may feel weak, hungry, and restless. Tell your doctor if you have these symptoms. The test usually will be stopped.  You may vomit after drinking the sweet liquid. If this happens, you may need to take the test at a later time.  Your doctor may do more glucose tests at other times during your pregnancy.  Follow-up care is a key part of your treatment and safety. Be sure to make and go to all appointments, and call your doctor if you are having problems. It's also a good idea to know your test results and keep a list of the medicines you take.  Where can you learn more?  Go to https://www.[employer].net/patiented  Enter A472 in the search box to learn more about \"Learning About Screening for Gestational Diabetes.\"  Current as of: 2023               Content Version: 14.0    3804-9156 [employer].   Care instructions adapted under license by your healthcare professional. If you have questions about a medical condition or this instruction, always ask your healthcare professional. [employer] disclaims any warranty or liability for your use of this information.      You have been provided the My Labor and Birth Wishes document.  Please review at home and bring to your next prenatal visit. Bring this sheet to the hospital for your birth. Give copies to your care team members and support person.   Additional copies can be found here:  [email or web address]  Weeks 26 to 30 of Your Pregnancy: Care Instructions  You're starting your last trimester. You'll probably feel your baby moving around more. Your back may ache as your body gets used to your baby's size and length. Take care of yourself, and pay attention to what your body needs.    Talk to your doctor about getting the Tdap shot. It will help protect your  against whooping cough (pertussis). Also ask your doctor about " flu and COVID-19 shots if you haven't had them yet. If your blood type is Rh negative, you may be given a shot of Rh immune globulin (such as RhoGAM). It can help prevent problems for your baby.    You may have [place] contractions. They are single or several strong contractions without a pattern. These are practice contractions but not the start of labor.  Be kind to yourself.     Take breaks when you're tired.  Change positions often. Don't sit for too long or stand for too long.  At work, rest during breaks if you can. If you don't get breaks, talk to your doctor about writing a letter to your employer to request them.  Avoid fumes, chemicals, and tobacco smoke.  Be sexual if you want to.     You may be interested in sex, or you may not. Everyone is different.  Sex is okay unless your doctor tells you not to.  Your belly can make it hard to find good positions for sex. [place] and explore.  Watch for signs of  labor.    These signs include:   Menstrual-like cramps. Or you may have pain or pressure in your pelvis that happens in a pattern.  About 6 or more contractions in an hour (even after rest and a glass of water).  A low, dull backache that doesn't go away when you change positions.  An increase or change in vaginal discharge.  Light vaginal bleeding or spotting.  Your water breaking.  Know what to do if you think you are having contractions.     Drink 1 or 2 glasses of water.  Lie down on your left side for at least an hour.  While on your side, feel the top of your belly to see if it's tight.  Write down your contractions for an hour. Time how long it is from the start of one contraction to the start of the next.  Call your doctor if you have regular contractions.  Follow-up care is a key part of your treatment and safety. Be sure to make and go to all appointments, and call your doctor if you are having problems. It's also a good idea to know your test results and keep a list of the  "medicines you take.  Where can you learn more?  Go to https://www.vitalclip.net/patiented  Enter S999 in the search box to learn more about \"Weeks 26 to 30 of Your Pregnancy: Care Instructions.\"  Current as of: July 10, 2023               Content Version: 14.0    9581-6037 [employer].   Care instructions adapted under license by your healthcare professional. If you have questions about a medical condition or this instruction, always ask your healthcare professional. [employer] disclaims any warranty or liability for your use of this information.      Counting Your Baby's Kicks: Care Instructions  Overview     Counting your baby's kicks is one way your doctor can tell that your baby is healthy. You will probably feel your baby move for the first time between 16 and 22 weeks. The movement may feel like flutters rather than kicks. Your baby may move more at certain times of the day. When you are active, you may notice less kicking than when you are resting. At your prenatal visits, your doctor will ask whether the baby is active.  In your last trimester, your doctor may ask you to count the number of times you feel your baby move.  Follow-up care is a key part of your treatment and safety. Be sure to make and go to all appointments, and call your doctor if you are having problems. It's also a good idea to know your test results and keep a list of the medicines you take.  How do you count fetal kicks?  A common method of checking your baby's movement is to note the length of time it takes to count 10 movements (such as kicks, flutters, or rolls).  Pick your baby's most active time of day to count. This may be any time from morning to evening.  If you don't feel 10 movements in an hour, have something to eat or drink and count for another hour. If you don't feel at least 10 movements in the 2-hour period, call your doctor.  Do not use an at-home Doppler heart monitor in place of counting " "fetal movements.  When should you call for help?   Call your doctor now or seek immediate medical care if:    You feel fewer than 10 movements in a 2-hour period.     You noticed that your baby has stopped moving or is moving less than normal.   Watch closely for changes in your health, and be sure to contact your doctor if you have any problems.  Where can you learn more?  Go to https://www.[email or web address].net/patiented  Enter U048 in the search box to learn more about \"Counting Your Baby's Kicks: Care Instructions.\"  Current as of: July 10, 2023               Content Version: 14.0    2437-2618 [employer].   Care instructions adapted under license by your healthcare professional. If you have questions about a medical condition or this instruction, always ask your healthcare professional. [employer] disclaims any warranty or liability for your use of this information.        "

## 2024-07-18 ENCOUNTER — LAB (OUTPATIENT)
Dept: LAB | Facility: CLINIC | Age: 34
End: 2024-07-18
Payer: COMMERCIAL

## 2024-07-18 ENCOUNTER — PRENATAL OFFICE VISIT (OUTPATIENT)
Dept: MIDWIFE SERVICES | Facility: CLINIC | Age: 34
End: 2024-07-18
Payer: COMMERCIAL

## 2024-07-18 VITALS — BODY MASS INDEX: 31.29 KG/M2 | DIASTOLIC BLOOD PRESSURE: 64 MMHG | WEIGHT: 201 LBS | SYSTOLIC BLOOD PRESSURE: 120 MMHG

## 2024-07-18 DIAGNOSIS — Z23 NEED FOR TDAP VACCINATION: ICD-10-CM

## 2024-07-18 DIAGNOSIS — Z29.13 NEED FOR RHOGAM DUE TO RH NEGATIVE MOTHER: Primary | ICD-10-CM

## 2024-07-18 DIAGNOSIS — Z36.9 ENCOUNTER FOR ANTENATAL SCREENING OF MOTHER: ICD-10-CM

## 2024-07-18 DIAGNOSIS — Z34.02 ENCOUNTER FOR SUPERVISION OF NORMAL FIRST PREGNANCY IN SECOND TRIMESTER: Primary | ICD-10-CM

## 2024-07-18 LAB
ANTIBODY SCREEN: NEGATIVE
ERYTHROCYTE [DISTWIDTH] IN BLOOD BY AUTOMATED COUNT: 12.3 % (ref 10–15)
GLUCOSE 1H P 50 G GLC PO SERPL-MCNC: 115 MG/DL (ref 70–129)
HCT VFR BLD AUTO: 33.6 % (ref 35–47)
HGB BLD-MCNC: 11 G/DL (ref 11.7–15.7)
MCH RBC QN AUTO: 29 PG (ref 26.5–33)
MCHC RBC AUTO-ENTMCNC: 32.7 G/DL (ref 31.5–36.5)
MCV RBC AUTO: 89 FL (ref 78–100)
PLATELET # BLD AUTO: 265 10E3/UL (ref 150–450)
RBC # BLD AUTO: 3.79 10E6/UL (ref 3.8–5.2)
SPECIMEN EXPIRATION DATE: NORMAL
WBC # BLD AUTO: 10.3 10E3/UL (ref 4–11)

## 2024-07-18 PROCEDURE — 86850 RBC ANTIBODY SCREEN: CPT

## 2024-07-18 PROCEDURE — 82950 GLUCOSE TEST: CPT

## 2024-07-18 PROCEDURE — 85027 COMPLETE CBC AUTOMATED: CPT

## 2024-07-18 PROCEDURE — 36415 COLL VENOUS BLD VENIPUNCTURE: CPT

## 2024-07-18 PROCEDURE — 99207 PR PRENATAL VISIT: CPT | Performed by: ADVANCED PRACTICE MIDWIFE

## 2024-08-14 ENCOUNTER — PRENATAL OFFICE VISIT (OUTPATIENT)
Dept: MIDWIFE SERVICES | Facility: CLINIC | Age: 34
End: 2024-08-14
Payer: COMMERCIAL

## 2024-08-14 VITALS — SYSTOLIC BLOOD PRESSURE: 118 MMHG | WEIGHT: 209 LBS | BODY MASS INDEX: 32.54 KG/M2 | DIASTOLIC BLOOD PRESSURE: 66 MMHG

## 2024-08-14 DIAGNOSIS — Z67.91 RH NEGATIVE STATUS DURING PREGNANCY IN THIRD TRIMESTER: ICD-10-CM

## 2024-08-14 DIAGNOSIS — Z34.03 ENCOUNTER FOR SUPERVISION OF NORMAL FIRST PREGNANCY IN THIRD TRIMESTER: Primary | ICD-10-CM

## 2024-08-14 DIAGNOSIS — O26.893 RH NEGATIVE STATUS DURING PREGNANCY IN THIRD TRIMESTER: ICD-10-CM

## 2024-08-14 DIAGNOSIS — Z23 NEED FOR TDAP VACCINATION: ICD-10-CM

## 2024-08-14 PROCEDURE — 99207 PR PRENATAL VISIT: CPT | Performed by: ADVANCED PRACTICE MIDWIFE

## 2024-08-14 PROCEDURE — 96372 THER/PROPH/DIAG INJ SC/IM: CPT

## 2024-08-14 PROCEDURE — 90471 IMMUNIZATION ADMIN: CPT | Performed by: ADVANCED PRACTICE MIDWIFE

## 2024-08-14 PROCEDURE — 90715 TDAP VACCINE 7 YRS/> IM: CPT | Performed by: ADVANCED PRACTICE MIDWIFE

## 2024-08-14 NOTE — PATIENT INSTRUCTIONS
Weeks 26 to 30 of Your Pregnancy: Care Instructions  You're starting your last trimester. You'll probably feel your baby moving around more. Your back may ache as your body gets used to your baby's size and length. Take care of yourself, and pay attention to what your body needs.    Talk to your doctor about getting the Tdap shot. It will help protect your  against whooping cough (pertussis). Also ask your doctor about flu and COVID-19 shots if you haven't had them yet. If your blood type is Rh negative, you may be given a shot of Rh immune globulin (such as RhoGAM). It can help prevent problems for your baby.   You may have [place] contractions. They are single or several strong contractions without a pattern. These are practice contractions but not the start of labor.   Be kind to yourself.       Take breaks when you're tired.  Change positions often. Don't sit for too long or stand for too long.  At work, rest during breaks if you can. If you don't get breaks, talk to your doctor about writing a letter to your employer to request them.  Avoid fumes, chemicals, and tobacco smoke.  Be sexual if you want to.       You may be interested in sex, or you may not. Everyone is different.  Sex is okay unless your doctor tells you not to.  Your belly can make it hard to find good positions for sex. [place] and explore.  Watch for signs of  labor.        These signs include:  Menstrual-like cramps. Or you may have pain or pressure in your pelvis that happens in a pattern.  About 6 or more contractions in an hour (even after rest and a glass of water).  A low, dull backache that doesn't go away when you change positions.  An increase or change in vaginal discharge.  Light vaginal bleeding or spotting.  Your water breaking.  Know what to do if you think you are having contractions.       Drink 1 or 2 glasses of water.  Lie down on your left side for at least an hour.  While on your side, feel the top of  "your belly to see if it's tight.  Write down your contractions for an hour. Time how long it is from the start of one contraction to the start of the next.  Call your doctor if you have regular contractions.  Follow-up care is a key part of your treatment and safety. Be sure to make and go to all appointments, and call your doctor if you are having problems. It's also a good idea to know your test results and keep a list of the medicines you take.  Where can you learn more?  Go to https://www.[employer].net/patiented  Enter S999 in the search box to learn more about \"Weeks 26 to 30 of Your Pregnancy: Care Instructions.\"  Current as of: July 10, 2023  Content Version: 14.1 2006-2024 [employer].   Care instructions adapted under license by your healthcare professional. If you have questions about a medical condition or this instruction, always ask your healthcare professional. [employer] disclaims any warranty or liability for your use of this information.    Counting Your Baby's Kicks: Care Instructions  Overview     Counting your baby's kicks is one way your doctor can tell that your baby is healthy. You will probably feel your baby move for the first time between 16 and 22 weeks. The movement may feel like flutters rather than kicks. Your baby may move more at certain times of the day. When you are active, you may notice less kicking than when you are resting. At your prenatal visits, your doctor will ask whether the baby is active.  In your last trimester, your doctor may ask you to count the number of times you feel your baby move.  Follow-up care is a key part of your treatment and safety. Be sure to make and go to all appointments, and call your doctor if you are having problems. It's also a good idea to know your test results and keep a list of the medicines you take.  How do you count fetal kicks?  A common method of checking your baby's movement is to note the length of time it " "takes to count 10 movements (such as kicks, flutters, or rolls).  Pick your baby's most active time of day to count. This may be any time from morning to evening.  If you don't feel 10 movements in an hour, have something to eat or drink and count for another hour. If you don't feel at least 10 movements in the 2-hour period, call your doctor.  Do not use an at-home Doppler heart monitor in place of counting fetal movements.  When should you call for help?   Call your doctor now or seek immediate medical care if:    You feel fewer than 10 movements in a 2-hour period.     You noticed that your baby has stopped moving or is moving less than normal.   Watch closely for changes in your health, and be sure to contact your doctor if you have any problems.  Where can you learn more?  Go to https://www.[employer].net/patiented  Enter U048 in the search box to learn more about \"Counting Your Baby's Kicks: Care Instructions.\"  Current as of: July 10, 2023               Content Version: 14.0    7426-7932 [employer].   Care instructions adapted under license by your healthcare professional. If you have questions about a medical condition or this instruction, always ask your healthcare professional. [employer] disclaims any warranty or liability for your use of this information.      "

## 2024-08-14 NOTE — PROGRESS NOTES
28w0d  Feels well overall. Here with Junito today.   Fetal movement: positive, denies loss of fluid/vb/contractions, signs and symptoms preeclampsia    Talked about labor process. Answered all questions.     Tdap given: Yes  Rhogam: Yes    Reviewed PTL precautions, S&S of preeclampsia and fetal movement awareness, patient verbalizes understanding and what to report    Return to clinic 2 weeks    Sharita Bloom, SHERIDAN, APRN, CNM

## 2024-08-28 PROBLEM — Z29.13 NEED FOR RHOGAM DUE TO RH NEGATIVE MOTHER: Status: ACTIVE | Noted: 2024-08-14

## 2024-08-28 NOTE — PROGRESS NOTES
30w0d  Feels well, some swelling, wondering about compression socks, heading to cabin.   Fetal Movement: positive, denies loss of fluid/vb, no contractions, denies signs and symptoms preeclampsia  Fetal kick counts reviewed and handout given  Reviewed PTL precautions, s/sx of preeclampsia, fetal movement counts  Discussed OTC compression socks, can do elevation feet above heart, avoid salt, increase hydration  Encouraged rest, listen to body in 3rd trimester, call with any concerns.  Return to clinic in 2 weeks    MARIA D Newby, ASIMM

## 2024-08-30 ENCOUNTER — PRENATAL OFFICE VISIT (OUTPATIENT)
Dept: MIDWIFE SERVICES | Facility: CLINIC | Age: 34
End: 2024-08-30
Payer: COMMERCIAL

## 2024-08-30 VITALS — SYSTOLIC BLOOD PRESSURE: 120 MMHG | DIASTOLIC BLOOD PRESSURE: 68 MMHG | WEIGHT: 212.4 LBS | BODY MASS INDEX: 33.07 KG/M2

## 2024-08-30 DIAGNOSIS — Z34.03 ENCOUNTER FOR SUPERVISION OF NORMAL FIRST PREGNANCY IN THIRD TRIMESTER: Primary | ICD-10-CM

## 2024-08-30 PROBLEM — E78.5 HYPERLIPIDEMIA, UNSPECIFIED HYPERLIPIDEMIA TYPE: Status: RESOLVED | Noted: 2019-03-18 | Resolved: 2024-08-30

## 2024-08-30 PROBLEM — R14.0 ABDOMINAL BLOATING: Status: RESOLVED | Noted: 2023-03-29 | Resolved: 2024-08-30

## 2024-08-30 PROCEDURE — 99207 PR PRENATAL VISIT: CPT | Performed by: ADVANCED PRACTICE MIDWIFE

## 2024-08-30 NOTE — PATIENT INSTRUCTIONS
"Counting Your Baby's Kicks: Care Instructions  Overview     Counting your baby's kicks is one way your doctor can tell that your baby is healthy. You will probably feel your baby move for the first time between 16 and 22 weeks. The movement may feel like flutters rather than kicks. Your baby may move more at certain times of the day. When you are active, you may notice less kicking than when you are resting. At your prenatal visits, your doctor will ask whether the baby is active.  In your last trimester, your doctor may ask you to count the number of times you feel your baby move.  Follow-up care is a key part of your treatment and safety. Be sure to make and go to all appointments, and call your doctor if you are having problems. It's also a good idea to know your test results and keep a list of the medicines you take.  How do you count fetal kicks?  A common method of checking your baby's movement is to note the length of time it takes to count 10 movements (such as kicks, flutters, or rolls).  Pick your baby's most active time of day to count. This may be any time from morning to evening.  If you don't feel 10 movements in an hour, have something to eat or drink and count for another hour. If you don't feel at least 10 movements in the 2-hour period, call your doctor.  Do not use an at-home Doppler heart monitor in place of counting fetal movements.  When should you call for help?   Call your doctor now or seek immediate medical care if:    You feel fewer than 10 movements in a 2-hour period.     You noticed that your baby has stopped moving or is moving less than normal.   Watch closely for changes in your health, and be sure to contact your doctor if you have any problems.  Where can you learn more?  Go to https://www.healthwise.net/patiented  Enter U048 in the search box to learn more about \"Counting Your Baby's Kicks: Care Instructions.\"  Current as of: July 10, 2023               Content Version: 14.0    " [id number] [employer].   Care instructions adapted under license by your healthcare professional. If you have questions about a medical condition or this instruction, always ask your healthcare professional. [employer] disclaims any warranty or liability for your use of this information.        Weeks 26 to 30 of Your Pregnancy: Care Instructions  You're starting your last trimester. You'll probably feel your baby moving around more. Your back may ache as your body gets used to your baby's size and length. Take care of yourself, and pay attention to what your body needs.    Talk to your doctor about getting the Tdap shot. It will help protect your  against whooping cough (pertussis). Also ask your doctor about flu and COVID-19 shots if you haven't had them yet. If your blood type is Rh negative, you may be given a shot of Rh immune globulin (such as RhoGAM). It can help prevent problems for your baby.   You may have [place] contractions. They are single or several strong contractions without a pattern. These are practice contractions but not the start of labor.   Be kind to yourself.       Take breaks when you're tired.  Change positions often. Don't sit for too long or stand for too long.  At work, rest during breaks if you can. If you don't get breaks, talk to your doctor about writing a letter to your employer to request them.  Avoid fumes, chemicals, and tobacco smoke.  Be sexual if you want to.       You may be interested in sex, or you may not. Everyone is different.  Sex is okay unless your doctor tells you not to.  Your belly can make it hard to find good positions for sex. [place] and explore.  Watch for signs of  labor.        These signs include:  Menstrual-like cramps. Or you may have pain or pressure in your pelvis that happens in a pattern.  About 6 or more contractions in an hour (even after rest and a glass of water).  A low, dull backache that doesn't  "go away when you change positions.  An increase or change in vaginal discharge.  Light vaginal bleeding or spotting.  Your water breaking.  Know what to do if you think you are having contractions.       Drink 1 or 2 glasses of water.  Lie down on your left side for at least an hour.  While on your side, feel the top of your belly to see if it's tight.  Write down your contractions for an hour. Time how long it is from the start of one contraction to the start of the next.  Call your doctor if you have regular contractions.  Follow-up care is a key part of your treatment and safety. Be sure to make and go to all appointments, and call your doctor if you are having problems. It's also a good idea to know your test results and keep a list of the medicines you take.  Where can you learn more?  Go to https://www.AchieveMint.net/patiented  Enter S999 in the search box to learn more about \"Weeks 26 to 30 of Your Pregnancy: Care Instructions.\"  Current as of: July 10, 2023  Content Version: 14.1 2006-2024 [employer].   Care instructions adapted under license by your healthcare professional. If you have questions about a medical condition or this instruction, always ask your healthcare professional. [employer] disclaims any warranty or liability for your use of this information.    Weeks 30 to 32 of Your Pregnancy: Care Instructions  Your baby is growing more every day. Its eyes can open and close, and it may have hair on its head. Your baby may sleep 20 to 45 minutes at a time and is more active at certain times.    You should feel your baby move several times every day. Your baby now turns less and kicks more.    This is a good time to tour your hospital or birthing center. You may also want to find childcare if needed.    To ease heartburn    Avoid foods that make your symptoms worse, such as chocolate, spicy foods, and caffeine.  Avoid bending over or lying down after meals.  Do not eat for " "2 hours before bedtime.  Take antacids like Tums, but don't take ones that have sodium bicarbonate, magnesium trisilicate, or aspirin.    To care for large, swollen veins (varicose veins)    Try to avoid standing for long periods of time.  Sit with your feet propped up.  Wear support hose.  Get some exercise every day, like walking or swimming.  Counting your baby's kicks  Your doctor may ask you to count your baby's movements, such as kicks, flutters, or rolls.    Find a quiet place, and get comfortable. Write down your start time. Count your baby's movements (except hiccups). When your baby has moved 10 times, you can stop counting. Write down how many minutes it took.    If an hour goes by and you don't feel 10 movements, have something to eat or drink. Count for another hour. If you don't feel at least 10 movements in the 2-hour period, call your doctor.  Follow-up care is a key part of your treatment and safety. Be sure to make and go to all appointments, and call your doctor if you are having problems. It's also a good idea to know your test results and keep a list of the medicines you take.  Where can you learn more?  Go to https://www.tagUin.net/patiented  Enter X471 in the search box to learn more about \"Weeks 30 to 32 of Your Pregnancy: Care Instructions.\"  Current as of: July 10, 2023               Content Version: 14.0    2870-2208 [email or web address].   Care instructions adapted under license by your healthcare professional. If you have questions about a medical condition or this instruction, always ask your healthcare professional. [email or web address] disclaims any warranty or liability for your use of this information.      "

## 2024-09-11 ENCOUNTER — PRENATAL OFFICE VISIT (OUTPATIENT)
Dept: MIDWIFE SERVICES | Facility: CLINIC | Age: 34
End: 2024-09-11
Payer: COMMERCIAL

## 2024-09-11 VITALS — SYSTOLIC BLOOD PRESSURE: 122 MMHG | DIASTOLIC BLOOD PRESSURE: 60 MMHG | BODY MASS INDEX: 33.63 KG/M2 | WEIGHT: 216 LBS

## 2024-09-11 DIAGNOSIS — Z34.03 ENCOUNTER FOR SUPERVISION OF NORMAL FIRST PREGNANCY IN THIRD TRIMESTER: Primary | ICD-10-CM

## 2024-09-11 PROCEDURE — 99207 PR PRENATAL VISIT: CPT | Performed by: ADVANCED PRACTICE MIDWIFE

## 2024-09-11 NOTE — PATIENT INSTRUCTIONS
"Weeks 32 to 34 of Your Pregnancy: Care Instructions    Decide whether you want to bank or donate your baby's umbilical cord blood. If you want to save this blood, you have to arrange for it ahead of time.   Decide about circumcision. Personal, Jew, or cultural beliefs may play a role in your decision. You get to decide what you want for your baby.         Learn how to ease hemorrhoids.   Get more liquids, fruits, vegetables, and fiber in your diet.  Avoid sitting for too long.  Clean yourself with moist toilet paper. Or try witch hazel pads.  Try ice packs or warm sitz baths for discomfort.  Use hydrocortisone cream for pain or itching.  Ask your doctor about stool softeners.        Consider the benefits of breastfeeding.   It reduces your baby's risk of sudden infant death syndrome (SIDS).   babies are less likely to get certain infections. And they're less likely to be obese or get diabetes later in life.  It can lower your risk of breast and ovarian cancers and osteoporosis.  It saves you money.  Follow-up care is a key part of your treatment and safety. Be sure to make and go to all appointments, and call your doctor if you are having problems. It's also a good idea to know your test results and keep a list of the medicines you take.  Where can you learn more?  Go to https://www.Pavlok.net/patiented  Enter X711 in the search box to learn more about \"Weeks 32 to 34 of Your Pregnancy: Care Instructions.\"  Current as of: July 10, 2023  Content Version: 14.1 2006-2024 [employer].   Care instructions adapted under license by your healthcare professional. If you have questions about a medical condition or this instruction, always ask your healthcare professional. Healthwise, [employer] disclaims any warranty or liability for your use of this information.    "

## 2024-09-11 NOTE — PROGRESS NOTES
32w0d  Feels well overall Here with Junito today.   Fetal Movement: positive, denies loss of fluid/vb, no contractions, denies signs and symptoms preeclampsia  Peds chosen: not yet - looking into it.  Discussed Hep B, Vit K, Erythromycin and circumcision.    Plans to breastfeed Yes    Fetal kick counts/movements reviewed  Reviewed danger signs, signs and symptoms PTL, and s/sx of preeclampsia     Return to clinic 2 weeks    Sharita Bloom, DNP, APRN, CNM

## 2024-09-25 ENCOUNTER — PRENATAL OFFICE VISIT (OUTPATIENT)
Dept: MIDWIFE SERVICES | Facility: CLINIC | Age: 34
End: 2024-09-25
Payer: COMMERCIAL

## 2024-09-25 VITALS — WEIGHT: 221 LBS | BODY MASS INDEX: 34.41 KG/M2 | SYSTOLIC BLOOD PRESSURE: 124 MMHG | DIASTOLIC BLOOD PRESSURE: 66 MMHG

## 2024-09-25 DIAGNOSIS — Z23 NEED FOR PROPHYLACTIC VACCINATION AND INOCULATION AGAINST INFLUENZA: ICD-10-CM

## 2024-09-25 DIAGNOSIS — Z23 NEED FOR COVID-19 VACCINE: ICD-10-CM

## 2024-09-25 DIAGNOSIS — Z34.03 ENCOUNTER FOR SUPERVISION OF NORMAL FIRST PREGNANCY IN THIRD TRIMESTER: Primary | ICD-10-CM

## 2024-09-25 PROCEDURE — 90480 ADMN SARSCOV2 VAC 1/ONLY CMP: CPT | Performed by: ADVANCED PRACTICE MIDWIFE

## 2024-09-25 PROCEDURE — 99207 PR PRENATAL VISIT: CPT | Performed by: ADVANCED PRACTICE MIDWIFE

## 2024-09-25 PROCEDURE — 90471 IMMUNIZATION ADMIN: CPT | Performed by: ADVANCED PRACTICE MIDWIFE

## 2024-09-25 PROCEDURE — 91320 SARSCV2 VAC 30MCG TRS-SUC IM: CPT | Performed by: ADVANCED PRACTICE MIDWIFE

## 2024-09-25 PROCEDURE — 90656 IIV3 VACC NO PRSV 0.5 ML IM: CPT | Performed by: ADVANCED PRACTICE MIDWIFE

## 2024-09-25 NOTE — PATIENT INSTRUCTIONS
"\"I hope you had a positive experience and that you can definitely recommend [facility] to your family and friends. You ll be receiving a survey soon and I would look forward to hearing your feedback\".  Weeks 34 to 36 of Your Pregnancy: Care Instructions  Your belly has grown quite large. It's almost time to give birth! Your baby's lungs are almost ready to breathe air. The skull bones are firm enough to protect your baby's head. But they're soft enough to move down through the birth canal.    You might be wondering what to expect during labor. Because each birth is different, there's no way to know exactly what childbirth will be like for you. Talk to your doctor or midwife about any concerns you have.    You'll probably have a test for group B streptococcus (GBS). GBS is bacteria that can live in the vagina and rectum. GBS can make your baby sick after birth. If you test positive, you'll get antibiotics during labor.    Choose what type of pain relief you would like during labor.  You can choose from a few types, including medicine and non-medicine options. You may want to use several types of pain relief.     Know how medicines can help with pain during labor.  Some medicines lower anxiety and help with some of the pain. Others make your lower body numb so that you will feel less pain.     Tell your doctor about your pain medicine choice.  Do this before you start labor or very early in your labor. You may be able to change your mind during labor.     Learn about the stages of labor.    The first stage includes the early (latent) and active phases of labor. Contractions start in early labor. During active labor, contractions get stronger, last longer, and happen more often. And the cervix opens more rapidly.  The second stage starts when you're ready to push. During this stage, your baby is born.  During the third stage, you'll usually have a few more contractions to push out the placenta. " "  Follow-up care is a key part of your treatment and safety. Be sure to make and go to all appointments, and call your doctor if you are having problems. It's also a good idea to know your test results and keep a list of the medicines you take.  Where can you learn more?  Go to https://www.Contextors.net/patiented  Enter B912 in the search box to learn more about \"Weeks 34 to 36 of Your Pregnancy: Care Instructions.\"  Current as of: July 10, 2023               Content Version: 14.0    3331-8986 [employer].   Care instructions adapted under license by your healthcare professional. If you have questions about a medical condition or this instruction, always ask your healthcare professional. [employer] disclaims any warranty or liability for your use of this information.      Group B Strep During Pregnancy: Care Instructions  Overview     Group B strep infection is caused by a type of bacteria. It's a different kind of bacteria than the kind that causes strep throat.  You may have this kind of bacteria in your body. Sometimes it may cause an infection, but most of the time it doesn't make you sick or cause symptoms. But if you pass the bacteria to your baby during the birth, it can cause serious health problems for your baby.  If you have this bacteria in your body, you will get antibiotics when you are in labor. Antibiotics help prevent problems for a  baby.  After birth, doctors will watch and may test your baby. If your baby tests positive for Group B strep, your baby will get antibiotics.  If you plan to breastfeed your baby, don't worry. It will be safe to breastfeed.  Follow-up care is a key part of your treatment and safety. Be sure to make and go to all appointments, and call your doctor if you are having problems. It's also a good idea to know your test results and keep a list of the medicines you take.  How can you care for yourself at home?  If your doctor has prescribed " "antibiotics, take them as directed. Do not stop taking them just because you feel better. You need to take the full course of antibiotics.  Tell your doctor if you are allergic to any antibiotic.  If you go into labor, or your water breaks, go to the hospital. Your doctor will give you antibiotics to help protect your baby from infection.  Tell the doctors and nurses if you have an allergy to penicillin.  Tell the doctors and nurses at the hospital that you tested positive for group B strep.  When should you call for help?   Call your doctor now or seek immediate medical care if:    You have symptoms of a urinary tract infection. These may include:  Pain or burning when you urinate.  A frequent need to urinate without being able to pass much urine.  Pain in the flank, which is just below the rib cage and above the waist on either side of the back.  Blood in your urine.  A fever.     You think you are in labor or your water has broken.     You have pain in your belly or pelvis.   Watch closely for changes in your health, and be sure to contact your doctor if you have any problems.  Where can you learn more?  Go to https://www.Crowdpark.net/patiented  Enter M001 in the search box to learn more about \"Group B Strep During Pregnancy: Care Instructions.\"  Current as of: June 12, 2023               Content Version: 14.0    9283-8035 [employer].   Care instructions adapted under license by your healthcare professional. If you have questions about a medical condition or this instruction, always ask your healthcare professional. [employer] disclaims any warranty or liability for your use of this information.      Circumcision in Infants: What to Expect at Home  Your Child's Recovery  After circumcision, your baby's penis may look red and swollen. It may have petroleum jelly and gauze on it. The gauze will likely come off when your baby urinates. Follow your doctor's directions about whether to " put clean gauze back on your baby's penis or to leave the gauze off. If you need to remove gauze from the penis, use warm water to soak the gauze and gently loosen it.  The doctor may have used a Plastibell device to do the circumcision. If so, your baby will have a plastic ring around the head of the penis. The ring should fall off by itself in 10 to 12 days.  A thin, yellow film may form over the area the day after the procedure. This is part of the normal healing process. It should go away in a few days.  Your baby may seem fussy while the area heals. It may hurt for your baby to urinate. This pain often gets better in 3 or 4 days. But it may last for up to 2 weeks.  Even though your baby's penis will likely start to feel better after 3 or 4 days, it may look worse. The penis often starts to look like it's getting better after about 7 to 10 days.  This care sheet gives you a general idea about how long it will take for your child to recover. But each child recovers at a different pace. Follow the steps below to help your child get better as quickly as possible.  How can you care for your child at home?  Activity    Let your baby rest as much as possible. Sleeping will help with recovery.     You can give your baby a sponge bath the day after surgery. Ask your doctor when it is okay to give your baby a bath.   Medicines    Your doctor will tell you if and when your child can restart any medicines. The doctor will also give you instructions about your child taking any new medicines.     Your doctor may recommend giving your baby acetaminophen (Tylenol) to help with pain after the procedure. Be safe with medicines. Give your child medicines exactly as prescribed. Call your doctor if you think your child is having a problem with a medicine.     Do not give your child two or more pain medicines at the same time unless the doctor told you to. Many pain medicines have acetaminophen, which is Tylenol. Too much  "acetaminophen (Tylenol) can be harmful.   Circumcision care    Always wash your hands before and after touching the circumcision area.     Gently wash your baby's penis with plain, warm water after each diaper change, and pat it dry. Do not use soap. Don't use hydrogen peroxide or alcohol. They can slow healing.     Do not try to remove the film that forms on the penis. The film will go away on its own.     Put plenty of petroleum jelly (such as Vaseline) on the circumcision area during each diaper change. This will prevent your baby's penis from sticking to the diaper while it heals.     Fasten your baby's diapers loosely so that there is less pressure on the penis while it heals.   Follow-up care is a key part of your child's treatment and safety. Be sure to make and go to all appointments, and call your doctor if your child is having problems. It's also a good idea to know your child's test results and keep a list of the medicines your child takes.  When should you call for help?   Call your doctor now or seek immediate medical care if:    Your baby has a fever over 100.4 F.     Your baby is extremely fussy or irritable, has a high-pitched cry, or refuses to eat.     Your baby does not have a wet diaper within 12 hours after the circumcision.     You find a spot of bleeding larger than a 2-inch [place] from the incision.     Your baby has signs of infection. Signs may include severe swelling; redness; a red streak on the shaft of the penis; or a thick, yellow discharge.   Watch closely for changes in your child's health, and be sure to contact your doctor if:    A Plastibell device was used for the circumcision and the ring has not fallen off after 10 to 12 days.   Where can you learn more?  Go to https://www.healthwise.net/patiented  Enter S255 in the search box to learn more about \"Circumcision in Infants: What to Expect at Home.\"  Current as of: October 24, 2023               Content Version: 14.0    4223-8517 " Healthwise, [employer].   Care instructions adapted under license by your healthcare professional. If you have questions about a medical condition or this instruction, always ask your healthcare professional. Healthwise, [employer] disclaims any warranty or liability for your use of this information.

## 2024-09-25 NOTE — PROGRESS NOTES
34w0d  Feels well overall. Here with Junito  Fetal Movement: positive, denies loss of fluid/vb, no  contractions, denies signs and symptoms preeclampsia     Have car seat Yes  Discussed GBS/cx check at next visit    Fetal kick counts/movements reviewed  AVS provided on signs and symptoms of PTL and s/sx of preeclampsia; denies any S&S and aware of what to report    Flu/Covid vaccine today    Return to clinic 2 weeks    Eri KILLIAN CNM      Next visit: RSV vaccine

## 2024-10-09 ENCOUNTER — PRENATAL OFFICE VISIT (OUTPATIENT)
Dept: MIDWIFE SERVICES | Facility: CLINIC | Age: 34
End: 2024-10-09
Payer: COMMERCIAL

## 2024-10-09 VITALS — DIASTOLIC BLOOD PRESSURE: 76 MMHG | SYSTOLIC BLOOD PRESSURE: 128 MMHG | WEIGHT: 224 LBS | BODY MASS INDEX: 34.87 KG/M2

## 2024-10-09 DIAGNOSIS — Z34.03 ENCOUNTER FOR SUPERVISION OF NORMAL FIRST PREGNANCY IN THIRD TRIMESTER: ICD-10-CM

## 2024-10-09 DIAGNOSIS — Z36.85 ANTENATAL SCREENING FOR STREPTOCOCCUS B: Primary | ICD-10-CM

## 2024-10-09 DIAGNOSIS — O99.013 ANEMIA AFFECTING PREGNANCY IN THIRD TRIMESTER: Primary | ICD-10-CM

## 2024-10-09 DIAGNOSIS — Z23 NEED FOR VACCINATION: ICD-10-CM

## 2024-10-09 LAB
ERYTHROCYTE [DISTWIDTH] IN BLOOD BY AUTOMATED COUNT: 12.3 % (ref 10–15)
HCT VFR BLD AUTO: 32.9 % (ref 35–47)
HGB BLD-MCNC: 10.9 G/DL (ref 11.7–15.7)
MCH RBC QN AUTO: 28.7 PG (ref 26.5–33)
MCHC RBC AUTO-ENTMCNC: 33.1 G/DL (ref 31.5–36.5)
MCV RBC AUTO: 87 FL (ref 78–100)
PLATELET # BLD AUTO: 257 10E3/UL (ref 150–450)
RBC # BLD AUTO: 3.8 10E6/UL (ref 3.8–5.2)
WBC # BLD AUTO: 10.5 10E3/UL (ref 4–11)

## 2024-10-09 PROCEDURE — 85027 COMPLETE CBC AUTOMATED: CPT | Performed by: ADVANCED PRACTICE MIDWIFE

## 2024-10-09 PROCEDURE — 36415 COLL VENOUS BLD VENIPUNCTURE: CPT | Performed by: ADVANCED PRACTICE MIDWIFE

## 2024-10-09 PROCEDURE — 87653 STREP B DNA AMP PROBE: CPT | Performed by: ADVANCED PRACTICE MIDWIFE

## 2024-10-09 PROCEDURE — 99207 PR PRENATAL VISIT: CPT | Performed by: ADVANCED PRACTICE MIDWIFE

## 2024-10-09 PROCEDURE — 90678 RSV VACC PREF BIVALENT IM: CPT | Performed by: ADVANCED PRACTICE MIDWIFE

## 2024-10-09 PROCEDURE — 90471 IMMUNIZATION ADMIN: CPT | Performed by: ADVANCED PRACTICE MIDWIFE

## 2024-10-09 RX ORDER — FERROUS GLUCONATE 324(38)MG
324 TABLET ORAL
Qty: 30 TABLET | Refills: 1 | Status: SHIPPED | OUTPATIENT
Start: 2024-10-09 | End: 2024-12-08

## 2024-10-09 NOTE — PATIENT INSTRUCTIONS
"\"I hope you had a positive experience and that you can definitely recommend [facility] to your family and friends. You ll be receiving a survey soon and I would look forward to hearing your feedback\".  Weeks 34 to 36 of Your Pregnancy: Care Instructions  Your belly has grown quite large. It's almost time to give birth! Your baby's lungs are almost ready to breathe air. The skull bones are firm enough to protect your baby's head. But they're soft enough to move down through the birth canal.    You might be wondering what to expect during labor. Because each birth is different, there's no way to know exactly what childbirth will be like for you. Talk to your doctor or midwife about any concerns you have.   You'll probably have a test for group B streptococcus (GBS). GBS is bacteria that can live in the vagina and rectum. GBS can make your baby sick after birth. If you test positive, you'll get antibiotics during labor.     Choose what type of pain relief you would like during labor.  You can choose from a few types, including medicine and non-medicine options. You may want to use several types of pain relief.     Know how medicines can help with pain during labor.  Some medicines lower anxiety and help with some of the pain. Others make your lower body numb so that you will feel less pain.     Tell your doctor about your pain medicine choice.  Do this before you start labor or very early in your labor. You may be able to change your mind during labor.     Learn about the stages of labor.    The first stage includes the early (latent) and active phases of labor. Contractions start in early labor. During active labor, contractions get stronger, last longer, and happen more often. And the cervix opens more rapidly.  The second stage starts when you're ready to push. During this stage, your baby is born.  During the third stage, you'll usually have a few more contractions to push out the placenta. " "  Follow-up care is a key part of your treatment and safety. Be sure to make and go to all appointments, and call your doctor if you are having problems. It's also a good idea to know your test results and keep a list of the medicines you take.  Where can you learn more?  Go to https://www.[employer].net/patiented  Enter B912 in the search box to learn more about \"Weeks 34 to 36 of Your Pregnancy: Care Instructions.\"  Current as of: July 10, 2023  Content Version: 142024 [employer].   Care instructions adapted under license by your healthcare professional. If you have questions about a medical condition or this instruction, always ask your healthcare professional. Healthwise[employer] Incorporated disclaims any warranty or liability for your use of this information.    Group B Strep During Pregnancy: Care Instructions  Overview     Group B strep infection is caused by a type of bacteria. It's a different kind of bacteria than the kind that causes strep throat.  You may have this kind of bacteria in your body. Sometimes it may cause an infection, but most of the time it doesn't make you sick or cause symptoms. But if you pass the bacteria to your baby during the birth, it can cause serious health problems for your baby.  If you have this bacteria in your body, you will get antibiotics when you are in labor. Antibiotics help prevent problems for a  baby.  After birth, doctors will watch and may test your baby. If your baby tests positive for Group B strep, your baby will get antibiotics.  If you plan to breastfeed your baby, don't worry. It will be safe to breastfeed.  Follow-up care is a key part of your treatment and safety. Be sure to make and go to all appointments, and call your doctor if you are having problems. It's also a good idea to know your test results and keep a list of the medicines you take.  How can you care for yourself at home?  If your doctor has prescribed antibiotics, take them as " "directed. Do not stop taking them just because you feel better. You need to take the full course of antibiotics.  Tell your doctor if you are allergic to any antibiotic.  If you go into labor, or your water breaks, go to the hospital. Your doctor will give you antibiotics to help protect your baby from infection.  Tell the doctors and nurses if you have an allergy to penicillin.  Tell the doctors and nurses at the hospital that you tested positive for group B strep.  When should you call for help?   Call your doctor now or seek immediate medical care if:    You have symptoms of a urinary tract infection. These may include:  Pain or burning when you urinate.  A frequent need to urinate without being able to pass much urine.  Pain in the flank, which is just below the rib cage and above the waist on either side of the back.  Blood in your urine.  A fever.     You think you are in labor or your water has broken.     You have pain in your belly or pelvis.   Watch closely for changes in your health, and be sure to contact your doctor if you have any problems.  Where can you learn more?  Go to https://www.[employer].net/patiented  Enter M001 in the search box to learn more about \"Group B Strep During Pregnancy: Care Instructions.\"  Current as of: June 12, 2023  Content Version: 14.2 2024 [employer].   Care instructions adapted under license by your healthcare professional. If you have questions about a medical condition or this instruction, always ask your healthcare professional. Healthwise, Incorporated disclaims any warranty or liability for your use of this information.    Week 37 of Your Pregnancy: Care Instructions    Most babies are born between 37 and 40 weeks.   This is a good time to pack a bag to take with you to the birth. Then it will be ready to go when you are.     Learn about breastfeeding.  For example, find out about ways to hold your baby to make breastfeeding easier. And think about " "learning how to pump and store milk.     Know that crying is normal.  It's common for babies to cry 1 to 3 hours a day. Some cry more, and some cry less.     Learn why babies cry.  They may be hungry; have gas; need a diaper change; or feel cold, warm, tired, lonely, or tense. Sometimes they cry for unknown reasons.     Think about what will help you stay calm when your baby cries.  Taking slow, deep breaths can help. So can taking a break. It's okay to put your baby somewhere safe (like their crib) and walk away for a few minutes.     Learn about safe sleep for your baby.  Always put your baby to sleep on their back. Place them alone in a crib or bassinet with a firm, flat surface. Keep soft items like stuffed animals out of the crib.     Learn what to expect with  poop.  Your baby will have their own bowel patterns. Some babies have several bowel movements a day. Some have fewer.     Know that  babies will often have loose, yellow bowel movements.  Formula-fed babies have more formed stools. If your baby's poop looks like pellets, your baby is constipated.   Follow-up care is a key part of your treatment and safety. Be sure to make and go to all appointments, and call your doctor if you are having problems. It's also a good idea to know your test results and keep a list of the medicines you take.  Where can you learn more?  Go to [email or web address].net/[email or web address]  Enter N257 in the search box to learn more about \"Week 37 of Your Pregnancy: Care Instructions.\"  Current as of: July 10, 2023  Content Version: 14.2024 [facility] [facility].   Care instructions adapted under license by your healthcare professional. If you have questions about a medical condition or this instruction, always ask your healthcare professional. Healthwise, Incorporated disclaims any warranty or liability for your use of this information.    "

## 2024-10-09 NOTE — PROGRESS NOTES
36w0d  Feels well overall  Wants to do a healthcare directive.   Fetal movement: positive  Denies bleeding/lof, a little breanna thomas contractions, denies signs and symptoms preeclampsia  GBS swab done today  Confirmed vertex via leopolds and bedside US  Cx:  Deferred       Breast pump Rx yes  CBC drawn today: Yes  RSV: Accepts  Labor instructions given  Reviewed fetal kick/movement counts  Warning signs and signs and symptoms preeclampsia reviewed    Return to clinic 1 week    Eri KILLIAN CNM    Next visit: Ask about birth plan

## 2024-10-11 LAB — GP B STREP DNA SPEC QL NAA+PROBE: NEGATIVE

## 2024-10-17 ENCOUNTER — PRENATAL OFFICE VISIT (OUTPATIENT)
Dept: MIDWIFE SERVICES | Facility: CLINIC | Age: 34
End: 2024-10-17
Payer: COMMERCIAL

## 2024-10-17 VITALS — WEIGHT: 223 LBS | DIASTOLIC BLOOD PRESSURE: 80 MMHG | SYSTOLIC BLOOD PRESSURE: 122 MMHG | BODY MASS INDEX: 34.72 KG/M2

## 2024-10-17 DIAGNOSIS — Z34.03 ENCOUNTER FOR SUPERVISION OF NORMAL FIRST PREGNANCY IN THIRD TRIMESTER: Primary | ICD-10-CM

## 2024-10-17 PROCEDURE — 99207 PR PRENATAL VISIT: CPT | Performed by: ADVANCED PRACTICE MIDWIFE

## 2024-10-17 NOTE — PROGRESS NOTES
37w1d  Feels well overall. Here with Junito today  Having some chest tightening last couple days. Subtle feeling on Sunday, Monday noticed it more throughout the day and then it has tapered off. Thinks it may be related to anxiety and stress. Feels like when she has too much caffeine but hasn't been drinking caffeine. Will continue monitoring    Fetal movement: positive  Denies vaginal bleeding/lof, no contractions, denies signs and symptoms preeclampsia  Has some perineal pressure/nerve pain    Plans for birth control after baby: Unsure, did NFP prior.   Birth Plan: nothing specific, wants the least amount of interventions.   Iron supplement: Yes is taking  AVS on fetal kick/movement counts, labor signs and symptoms, pre-eclampsia s/symptoms.  Aware of numbers to call      Return to clinic 1 week    Eri KILLIAN CNM

## 2024-10-17 NOTE — PATIENT INSTRUCTIONS
"\"I hope you had a positive experience and that you can definitely recommend [facility] to your family and friends. You ll be receiving a survey soon and I would look forward to hearing your feedback\".  Week 37 of Your Pregnancy: Care Instructions    Most babies are born between 37 and 40 weeks.   This is a good time to pack a bag to take with you to the birth. Then it will be ready to go when you are.     Learn about breastfeeding.  For example, find out about ways to hold your baby to make breastfeeding easier. And think about learning how to pump and store milk.     Know that crying is normal.  It's common for babies to cry 1 to 3 hours a day. Some cry more, and some cry less.     Learn why babies cry.  They may be hungry; have gas; need a diaper change; or feel cold, warm, tired, lonely, or tense. Sometimes they cry for unknown reasons.     Think about what will help you stay calm when your baby cries.  Taking slow, deep breaths can help. So can taking a break. It's okay to put your baby somewhere safe (like their crib) and walk away for a few minutes.     Learn about safe sleep for your baby.  Always put your baby to sleep on their back. Place them alone in a crib or bassinet with a firm, flat surface. Keep soft items like stuffed animals out of the crib.     Learn what to expect with  poop.  Your baby will have their own bowel patterns. Some babies have several bowel movements a day. Some have fewer.     Know that  babies will often have loose, yellow bowel movements.  Formula-fed babies have more formed stools. If your baby's poop looks like pellets, your baby is constipated.   Follow-up care is a key part of your treatment and safety. Be sure to make and go to all appointments, and call your doctor if you are having problems. It's also a good idea to know your test results and keep a list of the medicines you take.  Where can you learn more?  Go to " "https://[email or web address]/patiented  Enter N257 in the search box to learn more about \"Week 37 of Your Pregnancy: Care Instructions.\"  Current as of: July 10, 2023  Content Version: 2024 [employer].   Care instructions adapted under license by your healthcare professional. If you have questions about a medical condition or this instruction, always ask your healthcare professional. Healthwise, Incorporated disclaims any warranty or liability for your use of this information.    Week 38 of Your Pregnancy: Care Instructions  Believe it or not, your baby is almost here. You may notice how your baby responds to sounds, warmth, cold, and light. You may even know what kind of music your baby likes.    Even if you expect a vaginal birth, it's a good idea to learn about  section ().  is the delivery of a baby through a cut (incision) in your belly. It's a major surgery, so it has more risks than a vaginal birth.   During the first 2 weeks after the birth, limit visitors. Don't allow visitors who have colds or infections. Ask visitors to wash their hands before they touch your baby. And never let anyone smoke around your baby.     Know about unplanned C-sections.  Reasons for an unplanned  include labor that slows or stops, signs of distress in your baby, and high blood pressure or other problems for you.     Know about planned C-sections.  If your baby isn't in a head-down position for delivery (breech position), your doctor may plan a . Or you may have a planned  if you're having twins or more.     Get as much help as you can while you're in the hospital.  You can learn about feeding, diapering, and bathing your baby.     Talk to your doctor or midwife about how to care for the umbilical cord stump.  If your baby will be circumcised, also ask about how to care for that.     Ask friends or family for help, as you need it.  If you can, have another " "adult in your home for at least 2 or 3 days after the birth.     Try to nap when your baby naps.  This may be your best chance to get some sleep.     Watch for changes in your mental health.  For the first 1 to 2 weeks after birth, it's common to cry or feel sad or irritable. If these feelings last for more than 2 weeks, you may have postpartum depression. Ask your doctor for help. Postpartum depression can be treated.   Follow-up care is a key part of your treatment and safety. Be sure to make and go to all appointments, and call your doctor if you are having problems. It's also a good idea to know your test results and keep a list of the medicines you take.  Where can you learn more?  Go to https://www.[employer].net/patiented  Enter B044 in the search box to learn more about \"Week 38 of Your Pregnancy: Care Instructions.\"  Current as of: July 10, 2023  Content Version: 14.2024 [employer].   Care instructions adapted under license by your healthcare professional. If you have questions about a medical condition or this instruction, always ask your healthcare professional. Healthwise, Incorporated disclaims any warranty or liability for your use of this information.    Week 39 of Your Pregnancy: Care Instructions     babies can look different than what you see in pictures or movies. Their heads can be a strange shape right after birth. And they may have swollen eyes and red marks on their faces.   You can still get pregnant even if you are breastfeeding. If you don't want to get pregnant, talk to your doctor about birth control.   Tips for week 39 of pregnancy  If you plan to breastfeed, get prepared.       Continue to eat healthy foods.  Keep taking your prenatal vitamins during breastfeeding if your doctor recommends it.  Talk to your doctor before taking any medicines or supplements.  Choose the right birth control for you.       Intrauterine devices (IUDs) are placed in the uterus. " "Sometimes the IUD can be placed right after giving birth. They work for years.  Hormonal implants are placed under the skin of the arm. They also work for years.  Depo-Provera is a shot. You get it every 3 months.  Birth control pills can be used. They're taken every day.  Tubal ligation (tying your tubes) and vasectomy are surgeries. They're permanent.  Diaphragms, spermicide, and condoms must be used each time you have sex. If you used a diaphragm before, you should get refitted after the baby is born.  A birth control patch or ring can be used. These just can't be started until several weeks after you give birth.  Follow-up care is a key part of your treatment and safety. Be sure to make and go to all appointments, and call your doctor if you are having problems. It's also a good idea to know your test results and keep a list of the medicines you take.  Where can you learn more?  Go to https://www.[employer].net/patiented  Enter A811 in the search box to learn more about \"Week 39 of Your Pregnancy: Care Instructions.\"  Current as of: July 10, 2023  Content Version: 14.2 2024 [facility].   Care instructions adapted under license by your healthcare professional. If you have questions about a medical condition or this instruction, always ask your healthcare professional. Healthwise, Incorporated disclaims any warranty or liability for your use of this information.    Weeks 40 to 41 of Your Pregnancy: Care Instructions  By week 40, you've reached your due date. Your baby could be coming any day. Most babies born after their due dates are healthy. But you may have tests to make sure everything is okay. If you feel stressed, you could try a meditation exercise, such as guided imagery. It may help you relax.    If you are past 41 weeks, your doctor may measure the amount of fluid that surrounds your baby. They may also test your baby's movement and heart rate.   If you don't start labor on your own by 41 " "or 42 weeks, your doctor may want to start (induce) labor. If there are other concerns, your doctor may talk to you about a .   To start (induce) your labor, your doctor may do any of these things.    Place a narrow tube with a small balloon on the end (balloon catheter) into your cervix.  The doctor inflates the balloon. This helps your cervix open (dilate).     Sweep the membranes (separate the lining of the amniotic sac from the uterus).  This helps the uterus make a chemical that can start contractions.     \"Break your water\" (rupture the amniotic sac).  This may be done if your cervix has started to open.     Use medicines.  They may be used to soften the cervix or start contractions.   How to do guided imagery    Close your eyes, and take a few deep breaths. Picture a peaceful setting, such as a beach or a meadow. Add a winding path. Follow the path until you feel more and more relaxed.   Take a few minutes to breathe slowly and feel the calm. When you are ready, slowly take yourself back to the present. Count to 3, and open your eyes.   Follow-up care is a key part of your treatment and safety. Be sure to make and go to all appointments, and call your doctor if you are having problems. It's also a good idea to know your test results and keep a list of the medicines you take.  Where can you learn more?  Go to https://www.[employer].net/patiented  Enter T922 in the search box to learn more about \"Weeks 40 to 41 of Your Pregnancy: Care Instructions.\"  Current as of: July 10, 2023  Content Version: 14.2024 [employer].   Care instructions adapted under license by your healthcare professional. If you have questions about a medical condition or this instruction, always ask your healthcare professional. Healthwise, Incorporated disclaims any warranty or liability for your use of this information.    "

## 2024-10-22 NOTE — PATIENT INSTRUCTIONS
"NATURAL LABOR PREPARATION    So, you're getting closer and closer to your due date and wondering what you can do to help get your body ready for labor.   Here are some natural methods you can try:    NOTE: DO NOT begin any of the following prior to 36 completed weeks of pregnancy!    Evening Primrose Oil: Take 1000mg by mouth three times per day. This encourages the cervix to ripen. Cervical ripening is when your cervix becomes more soft and stretchy to get ready for dilation and effacement.     Red Raspberry Leaf Tea: Red raspberry tea (get it at a Co[employer] or in the grocery store). Drink three cups per day (hot or cold). This can help to prepare the uterine muscles for labor.     Labor Instructions for Midwife Patients    When to call:  Both during and after office hours call  [phone]. There is a triage RN to take your calls and answer your questions 24 hours a day.  If they cannot answer your question they will page the midwife on call for you.    When to call:  Call anytime you have important concerns about you or your baby.     Call if:  You are having contractions at regular intervals about 5-6 minutes apart lasting 60 seconds and becoming increasingly more intense   You have an uncontrollable gush of fluid from your vagina or feel a pop and gush like your water has broken  You have HEAVY bleeding, like heavy period, blood running down your legs, or  soaking a pad.   Some bleeding after a pelvic exam, after intercourse, or in labor when your cervix is dilating is normal and is referred to as \"bloody show\"  You have severe, continuous back or abdominal pain  You feel it is time to go to the hospital  If this is your first labor, call when contractions are very intense and have been about every 3-4 minutes for about an hour  If it is your second labor or more, call when contractions are strong and about every 3-5 minutes or sooner depending on your level of discomfort.     Keep in mind we are always here for " you! If you have questions, concerns please don't hesitate to call us.     What to eat/drink in labor: Drink plenty of fluid (water most importantly, juice, soda or tea without caffeine). Eat rice, pasta, soup, cereal, bread/toast, and fruit. Avoid dairy and greasy food as they are difficult to digest and you may experience some nausea during labor.    Comfort measures:  Baths and showers (ok even with ruptured membranes, it may temporarily slow contractions if you are still in the early stage of labor)  Warm/hot packs for back pain or discomfort  Back, belly, or thigh massages  Standing, rocking, walking, leaning over bed or tables, side-lying and sleeping    Miscellaneous:   Contractions are timed from the beginning of one to the beginning of the next  Try hard to sleep during the early stage of labor when you are not that uncomfortable. Timing of contractions at this point is not important  Even if you cannot sleep, resting in bed or on the couch can help you maintain your energy for labor  When you arrive at the hospital the nurse will check your baby's heartbeat, check your cervix, and will call us. The midwife on call will come in and be with you when you are in active labor  From 7am to 11pm, you can park in the East ramp and enter the hospital through Door 6  After hours you need to enter the hospital through the emergency room     COMMON MEDICATIONS AFTER GIVING BIRTH    After giving birth, it is common to be prescribed medications that are available over the counter.  It is recommended that you purchase these medications before you have your baby and have at home.  The common medications are:  Colace 100mg (generic name docusate sodium)  Motrin or Advil 200mg (generic name ibuprofen)  Tylenol 325-500mg (generic name acetaminophen)    If you are unable to find the medications or would prefer to have prescriptions sent to a pharmacy, that can be done when you are being discharged from the hospital.  If you  have any questions or concerns, please do not hesitate to contact our clinic: [phone].     Fetal Kick Counts    It is important to know when your baby's movements occur. We often get busy with work and life and do not pay close attention to their movements.      Women typically begin feeling movement between 18-22 weeks of gestation, sometimes it can be earlier or later depending on where your placenta is     Movements usually begin feeling like popping or fluttering and as the baby grows they become more pronounced    Toward the end of pregnancy as the baby gets larger they may not move as much or make as big of movements. Babies have maturing sleep cycles as well as not as much room to move and flip. If you are ever concerned about your baby's movements or have not felt the baby move for a while, we recommend you do a fetal kick count. Prior to starting your count drink a glass of water or juice and eat a snack. Then lay down on your side and begin to count movements.     How to do a Fetal Kick Counts    There are many different ways to monitor your baby's movements. Movements can range from large jabs to small kicks, or wiggles.  Hiccups count!    Count 10 movements in 2 hours when resting and focusing  Count 10 movements in 12 hours when doing normal activity    We recommend that if movements occur but seem decreased that you should be seen in the clinic or hospital for evaluation within 12 hours. If fetal movement is absent or fetal kick counts are low please contact us right away.    If you ever have any concerns about your baby's movements DO NOT HESITATE to call us, we are here for you!    [facility]    [phone]       PREECLAMPSIA SIGNS AND SYMPTOMS    Preeclampsia is a dangerous condition that some women develop in the second half of pregnancy. It can also begin after the baby is born.  Preeclampsia causes high blood pressure and can cause problems with many organ systems in your  "body.  It can also affect the growth of your baby. The exact cause of preeclampsia is unknown, however, there are signs and symptoms to watch for:    -A bad headache that doesn't improve with Tylenol  -Visual changes such as spots, flashes of light, blurry vision  -Pain in the upper right part of your abdomen, especially under the ribs that doesn't go away  -Nausea and/or vomiting  -Feeling extremely tired  -Yellowing of the skin and/or eyes  -Feeling \"not quite right\" or that something is wrong  -An extreme amount of swelling (some swelling in pregnancy is very normal)    If your midwife feels that you are developing preeclampsia, you will have lab tests drawn and will be monitored very closely.     If you are experiencing anyof these symptoms, call the [facility] immediately at [phone].       Week 38 of Your Pregnancy: Care Instructions  Believe it or not, your baby is almost here. You may notice how your baby responds to sounds, warmth, cold, and light. You may even know what kind of music your baby likes.    Even if you expect a vaginal birth, it's a good idea to learn about  section ().  is the delivery of a baby through a cut (incision) in your belly. It's a major surgery, so it has more risks than a vaginal birth.   During the first 2 weeks after the birth, limit visitors. Don't allow visitors who have colds or infections. Ask visitors to wash their hands before they touch your baby. And never let anyone smoke around your baby.     Know about unplanned C-sections.  Reasons for an unplanned  include labor that slows or stops, signs of distress in your baby, and high blood pressure or other problems for you.     Know about planned C-sections.  If your baby isn't in a head-down position for delivery (breech position), your doctor may plan a . Or you may have a planned  if you're having twins or more.     Get as much help as you can " "while you're in the hospital.  You can learn about feeding, diapering, and bathing your baby.     Talk to your doctor or midwife about how to care for the umbilical cord stump.  If your baby will be circumcised, also ask about how to care for that.     Ask friends or family for help, as you need it.  If you can, have another adult in your home for at least 2 or 3 days after the birth.     Try to nap when your baby naps.  This may be your best chance to get some sleep.     Watch for changes in your mental health.  For the first 1 to 2 weeks after birth, it's common to cry or feel sad or irritable. If these feelings last for more than 2 weeks, you may have postpartum depression. Ask your doctor for help. Postpartum depression can be treated.   Follow-up care is a key part of your treatment and safety. Be sure to make and go to all appointments, and call your doctor if you are having problems. It's also a good idea to know your test results and keep a list of the medicines you take.  Where can you learn more?  Go to https://www.[employer].net/patiented  Enter B044 in the search box to learn more about \"Week 38 of Your Pregnancy: Care Instructions.\"  Current as of: July 10, 2023  Content Version: 14.2 2024 [facility] [employer].   Care instructions adapted under license by your healthcare professional. If you have questions about a medical condition or this instruction, always ask your healthcare professional. [employer] disclaims any warranty or liability for your use of this information.    "

## 2024-10-22 NOTE — PROGRESS NOTES
38w0d    Feels well overall.     Fetal movement: positive  Denies vaginal bleeding/lof, no contractions, denies signs and symptoms preeclampsia  Code status at hospital: FULL Code    Fetal kick/movement counts reviewed  Labor signs and symptoms discussed, aware of numbers to call  Discussed warning signs, signs and symptoms preeclampsia    Return to clinic 1 week    MARIA D Banerjee, CNM

## 2024-10-23 ENCOUNTER — PRENATAL OFFICE VISIT (OUTPATIENT)
Dept: MIDWIFE SERVICES | Facility: CLINIC | Age: 34
End: 2024-10-23
Payer: COMMERCIAL

## 2024-10-23 VITALS — SYSTOLIC BLOOD PRESSURE: 126 MMHG | BODY MASS INDEX: 35.31 KG/M2 | WEIGHT: 226.8 LBS | DIASTOLIC BLOOD PRESSURE: 82 MMHG

## 2024-10-23 DIAGNOSIS — Z3A.38 38 WEEKS GESTATION OF PREGNANCY: ICD-10-CM

## 2024-10-23 DIAGNOSIS — Z34.03 ENCOUNTER FOR SUPERVISION OF NORMAL FIRST PREGNANCY IN THIRD TRIMESTER: Primary | ICD-10-CM

## 2024-10-23 DIAGNOSIS — Z91.89 HISTORY OF ANXIETY STATE: ICD-10-CM

## 2024-10-23 DIAGNOSIS — Z29.13 NEED FOR RHOGAM DUE TO RH NEGATIVE MOTHER: ICD-10-CM

## 2024-10-23 PROCEDURE — 99207 PR PRENATAL VISIT: CPT

## 2024-10-30 ENCOUNTER — PRENATAL OFFICE VISIT (OUTPATIENT)
Dept: MIDWIFE SERVICES | Facility: CLINIC | Age: 34
End: 2024-10-30
Payer: COMMERCIAL

## 2024-10-30 VITALS — DIASTOLIC BLOOD PRESSURE: 80 MMHG | WEIGHT: 225 LBS | SYSTOLIC BLOOD PRESSURE: 130 MMHG | BODY MASS INDEX: 35.03 KG/M2

## 2024-10-30 DIAGNOSIS — Z34.03 ENCOUNTER FOR SUPERVISION OF NORMAL FIRST PREGNANCY IN THIRD TRIMESTER: Primary | ICD-10-CM

## 2024-10-30 PROCEDURE — 99207 PR PRENATAL VISIT: CPT | Performed by: ADVANCED PRACTICE MIDWIFE

## 2024-10-30 NOTE — PATIENT INSTRUCTIONS
Week 38 of Your Pregnancy: Care Instructions  Believe it or not, your baby is almost here. You may notice how your baby responds to sounds, warmth, cold, and light. You may even know what kind of music your baby likes.    Even if you expect a vaginal birth, it's a good idea to learn about  section ().  is the delivery of a baby through a cut (incision) in your belly. It's a major surgery, so it has more risks than a vaginal birth.   During the first 2 weeks after the birth, limit visitors. Don't allow visitors who have colds or infections. Ask visitors to wash their hands before they touch your baby. And never let anyone smoke around your baby.     Know about unplanned C-sections.  Reasons for an unplanned  include labor that slows or stops, signs of distress in your baby, and high blood pressure or other problems for you.     Know about planned C-sections.  If your baby isn't in a head-down position for delivery (breech position), your doctor may plan a . Or you may have a planned  if you're having twins or more.     Get as much help as you can while you're in the hospital.  You can learn about feeding, diapering, and bathing your baby.     Talk to your doctor or midwife about how to care for the umbilical cord stump.  If your baby will be circumcised, also ask about how to care for that.     Ask friends or family for help, as you need it.  If you can, have another adult in your home for at least 2 or 3 days after the birth.     Try to nap when your baby naps.  This may be your best chance to get some sleep.     Watch for changes in your mental health.  For the first 1 to 2 weeks after birth, it's common to cry or feel sad or irritable. If these feelings last for more than 2 weeks, you may have postpartum depression. Ask your doctor for help. Postpartum depression can be treated.   Follow-up care is a key part of your treatment and safety. Be sure to make and go  "to all appointments, and call your doctor if you are having problems. It's also a good idea to know your test results and keep a list of the medicines you take.  Where can you learn more?  Go to https://www.[employer].net/patiented  Enter B044 in the search box to learn more about \"Week 38 of Your Pregnancy: Care Instructions.\"  Current as of: July 10, 2023  Content Version: 14.2 2024 [facility].   Care instructions adapted under license by your healthcare professional. If you have questions about a medical condition or this instruction, always ask your healthcare professional. [employer] disclaims any warranty or liability for your use of this information.    "

## 2024-11-02 NOTE — PROGRESS NOTES
40w0d  Feels good overall, here with  Guilherme Feeling really good! Hoping for spontaneous labor   Fetal movement: positive  Denies vaginal bleeding/lof, no contractions, denies signs and symptoms preeclampsia    Discussed post dates management, order for BPP between at 41 weeks, again at 41+ weeks (this will likely have to be done in [facility] if undelivered), discussed recommendation for induction of labor after 41 weeks, delivery by 42 weeks     Fetal kick/movement counts reviewed  Labor signs and symptoms discussed, aware of numbers to call  Discussed warning signs, signs and symptoms preeclampsia  Will plan to check cervix at next visit and schedule IOL. If IOL is after 41w3d then she will need a BPP over the weekend in [place] on 11/16 and 11/17     Return to clinic 1 week for 41 wk appt and BPP     MARIA D Boucher, ASIMM

## 2024-11-02 NOTE — PATIENT INSTRUCTIONS
"Labor Instructions for Midwife Patients    When to call:  Both during and after office hours call  [phone]. There is a triage RN to take your calls and answer your questions 24 hours a day.  If they cannot answer your question they will page the midwife on call for you.    When to call:  Call anytime you have important concerns about you or your baby.     Call if:  You are having contractions at regular intervals about 5-6 minutes apart lasting 60 seconds and becoming increasingly more intense   You have an uncontrollable gush of fluid from your vagina or feel a pop and gush like your water has broken  You have HEAVY bleeding, like heavy period, blood running down your legs, or  soaking a pad.   Some bleeding after a pelvic exam, after intercourse, or in labor when your cervix is dilating is normal and is referred to as \"bloody show\"  You have severe, continuous back or abdominal pain  You feel it is time to go to the hospital  If this is your first labor, call when contractions are very intense and have been about every 3-4 minutes for about an hour  If it is your second labor or more, call when contractions are strong and about every 3-5 minutes or sooner depending on your level of discomfort.     Keep in mind we are always here for you! If you have questions, concerns please don't hesitate to call us.     What to eat/drink in labor: Drink plenty of fluid (water most importantly, juice, soda or tea without caffeine). Eat rice, pasta, soup, cereal, bread/toast, and fruit. Avoid dairy and greasy food as they are difficult to digest and you may experience some nausea during labor.    Comfort measures:  Baths and showers (ok even with ruptured membranes, it may temporarily slow contractions if you are still in the early stage of labor)  Warm/hot packs for back pain or discomfort  Back, belly, or thigh massages  Standing, rocking, walking, leaning over bed or tables, side-lying and sleeping    Miscellaneous: " "  Contractions are timed from the beginning of one to the beginning of the next  Try hard to sleep during the early stage of labor when you are not that uncomfortable. Timing of contractions at this point is not important  Even if you cannot sleep, resting in bed or on the couch can help you maintain your energy for labor  When you arrive at the hospital the nurse will check your baby's heartbeat, check your cervix, and will call us. The midwife on call will come in and be with you when you are in active labor  From 7am to 11pm, you can park in the East ramp and enter the hospital through Door 6  After hours you need to enter the hospital through the emergency room      PREECLAMPSIA SIGNS AND SYMPTOMS    Preeclampsia is a dangerous condition that some women develop in the second half of pregnancy. It can also begin after the baby is born.  Preeclampsia causes high blood pressure and can cause problems with many organ systems in your body.  It can also affect the growth of your baby. The exact cause of preeclampsia is unknown, however, there are signs and symptoms to watch for:    -A bad headache that doesn't improve with Tylenol  -Visual changes such as spots, flashes of light, blurry vision  -Pain in the upper right part of your abdomen, especially under the ribs that doesn't go away  -Nausea and/or vomiting  -Feeling extremely tired  -Yellowing of the skin and/or eyes  -Feeling \"not quite right\" or that something is wrong  -An extreme amount of swelling (some swelling in pregnancy is very normal)    If your midwife feels that you are developing preeclampsia, you will have lab tests drawn and will be monitored very closely.     If you are experiencing anyof these symptoms, call the [facility] immediately at [phone].    Fetal Kick Counts    It is important to know when your baby's movements occur. We often get busy with work and life and do not pay close attention to their movements.    "   Women typically begin feeling movement between 18-22 weeks of gestation, sometimes it can be earlier or later depending on where your placenta is     Movements usually begin feeling like popping or fluttering and as the baby grows they become more pronounced    Toward the end of pregnancy as the baby gets larger they may not move as much or make as big of movements. Babies have maturing sleep cycles as well as not as much room to move and flip. If you are ever concerned about your baby's movements or have not felt the baby move for a while, we recommend you do a fetal kick count. Prior to starting your count drink a glass of water or juice and eat a snack. Then lay down on your side and begin to count movements.     How to do a Fetal Kick Counts    There are many different ways to monitor your baby's movements. Movements can range from large jabs to small kicks, or wiggles.  Hiccups count!    Count 10 movements in 2 hours when resting and focusing  Count 10 movements in 12 hours when doing normal activity    We recommend that if movements occur but seem decreased that you should be seen in the clinic or hospital for evaluation within 12 hours. If fetal movement is absent or fetal kick counts are low please contact us right away.    If you ever have any concerns about your baby's movements DO NOT HESITATE to call us, we are here for you!    [facility]    [phone]

## 2024-11-06 ENCOUNTER — PRENATAL OFFICE VISIT (OUTPATIENT)
Dept: MIDWIFE SERVICES | Facility: CLINIC | Age: 34
End: 2024-11-06
Payer: COMMERCIAL

## 2024-11-06 VITALS — BODY MASS INDEX: 35.19 KG/M2 | SYSTOLIC BLOOD PRESSURE: 134 MMHG | WEIGHT: 226 LBS | DIASTOLIC BLOOD PRESSURE: 82 MMHG

## 2024-11-06 DIAGNOSIS — Z29.13 NEED FOR RHOGAM DUE TO RH NEGATIVE MOTHER: ICD-10-CM

## 2024-11-06 DIAGNOSIS — O48.0 POST-TERM PREGNANCY, 40-42 WEEKS OF GESTATION: ICD-10-CM

## 2024-11-06 DIAGNOSIS — Z34.03 ENCOUNTER FOR SUPERVISION OF NORMAL FIRST PREGNANCY IN THIRD TRIMESTER: Primary | ICD-10-CM

## 2024-11-06 PROCEDURE — 99207 PR PRENATAL VISIT: CPT | Performed by: ADVANCED PRACTICE MIDWIFE

## 2024-11-11 PROBLEM — O09.93 SUPERVISION OF HIGH RISK PREGNANCY IN THIRD TRIMESTER: Status: ACTIVE | Noted: 2024-03-13

## 2024-11-11 NOTE — PROGRESS NOTES
41w0d  Feels well overall. Here with Junito today.  Elevated BP today, repeat normal at end of visit. No s/symptoms of pre-eclampsia  Fetal Movement: present   Denies loss of fluid/vb     BPP:  8/8    Induction of Labor Plan. Hoping yet for spontaneous onset of labor.  Will discuss and scheduled IOL on Saturday when in [place] for her BPP. BPP scheduled for 0900, 11/16. [employer] message sent to pt. with details.  Discussed SVE can be done in [facility] Saturday  Briefly discussed cervical ripening methods, if indicated.   If no cervical ripening is needed she can wait until Tuesday morning for IOL. If cervical ripening is needed, will plan for IOL Monday night.  However, if BP is elevated she would technically meet criteria for GHTN and IOL would be recommended on that alone, Saturday when she comes in for her BPP    Fetal kick/movement counts reviewed  Labor signs and symptoms discussed, aware of numbers to call  Discussed warning signs, signs and symptoms preeclampsia      Eri KILLIAN CNM

## 2024-11-13 ENCOUNTER — PRENATAL OFFICE VISIT (OUTPATIENT)
Dept: MIDWIFE SERVICES | Facility: CLINIC | Age: 34
End: 2024-11-13
Payer: COMMERCIAL

## 2024-11-13 ENCOUNTER — TELEPHONE (OUTPATIENT)
Dept: MIDWIFE SERVICES | Facility: CLINIC | Age: 34
End: 2024-11-13

## 2024-11-13 ENCOUNTER — MYC MEDICAL ADVICE (OUTPATIENT)
Dept: MIDWIFE SERVICES | Facility: CLINIC | Age: 34
End: 2024-11-13

## 2024-11-13 ENCOUNTER — ANCILLARY PROCEDURE (OUTPATIENT)
Dept: ULTRASOUND IMAGING | Facility: CLINIC | Age: 34
End: 2024-11-13
Attending: ADVANCED PRACTICE MIDWIFE
Payer: COMMERCIAL

## 2024-11-13 VITALS — SYSTOLIC BLOOD PRESSURE: 122 MMHG | BODY MASS INDEX: 35.22 KG/M2 | DIASTOLIC BLOOD PRESSURE: 86 MMHG | WEIGHT: 226.2 LBS

## 2024-11-13 DIAGNOSIS — Z67.91 RH NEGATIVE STATE IN ANTEPARTUM PERIOD, THIRD TRIMESTER: ICD-10-CM

## 2024-11-13 DIAGNOSIS — O09.93 ENCOUNTER FOR SUPERVISION OF HIGH RISK PREGNANCY IN THIRD TRIMESTER, ANTEPARTUM: Primary | ICD-10-CM

## 2024-11-13 DIAGNOSIS — Z86.59 HISTORY OF ANXIETY: ICD-10-CM

## 2024-11-13 DIAGNOSIS — O26.893 RH NEGATIVE STATE IN ANTEPARTUM PERIOD, THIRD TRIMESTER: ICD-10-CM

## 2024-11-13 DIAGNOSIS — O99.013 ANEMIA DURING PREGNANCY IN THIRD TRIMESTER: ICD-10-CM

## 2024-11-13 DIAGNOSIS — O48.0 POST-TERM PREGNANCY, 40-42 WEEKS OF GESTATION: ICD-10-CM

## 2024-11-13 PROCEDURE — 99207 PR PRENATAL VISIT: CPT | Performed by: ADVANCED PRACTICE MIDWIFE

## 2024-11-13 PROCEDURE — 76819 FETAL BIOPHYS PROFIL W/O NST: CPT | Performed by: OBSTETRICS & GYNECOLOGY

## 2024-11-13 NOTE — TELEPHONE ENCOUNTER
Type of Paperwork received:  FMLA     Date Rcvd:  11/13/2024    Rcvd From (Company name): [employer]    Telephone/Fax number to fax forms to: [phone]    Provider:  Midwives    Placed on Provider Cart Date:  11/13/2024

## 2024-11-13 NOTE — PATIENT INSTRUCTIONS
"\"I hope you had a positive experience and that you can definitely recommend [facility] to your family and friends. You ll be receiving a survey soon and I would look forward to hearing your feedback\".  Weeks 40 to 41 of Your Pregnancy: Care Instructions  By week 40, you've reached your due date. Your baby could be coming any day. Most babies born after their due dates are healthy. But you may have tests to make sure everything is okay. If you feel stressed, you could try a meditation exercise, such as guided imagery. It may help you relax.    If you are past 41 weeks, your doctor may measure the amount of fluid that surrounds your baby. They may also test your baby's movement and heart rate.   If you don't start labor on your own by 41 or 42 weeks, your doctor may want to start (induce) labor. If there are other concerns, your doctor may talk to you about a .   To start (induce) your labor, your doctor may do any of these things.    Place a narrow tube with a small balloon on the end (balloon catheter) into your cervix.  The doctor inflates the balloon. This helps your cervix open (dilate).     Sweep the membranes (separate the lining of the amniotic sac from the uterus).  This helps the uterus make a chemical that can start contractions.     \"Break your water\" (rupture the amniotic sac).  This may be done if your cervix has started to open.     Use medicines.  They may be used to soften the cervix or start contractions.   How to do guided imagery    Close your eyes, and take a few deep breaths. Picture a peaceful setting, such as a beach or a meadow. Add a winding path. Follow the path until you feel more and more relaxed.   Take a few minutes to breathe slowly and feel the calm. When you are ready, slowly take yourself back to the present. Count to 3, and open your eyes.   Follow-up care is a key part of your treatment and safety. Be sure to make and go to all appointments, and call your " "doctor if you are having problems. It's also a good idea to know your test results and keep a list of the medicines you take.  Where can you learn more?  Go to https://www.[employer].net/patiented  Enter T922 in the search box to learn more about \"Weeks 40 to 41 of Your Pregnancy: Care Instructions.\"  Current as of: July 10, 2023  Content Version: 14.2 2024 [employer].   Care instructions adapted under license by your healthcare professional. If you have questions about a medical condition or this instruction, always ask your healthcare professional. [employer] disclaims any warranty or liability for your use of this information.    Labor Induction  What You Need to Know  What is labor induction?  In most cases, it is best to go into labor naturally. When labor does not start on its own, we may use medicine or other methods to start (induce) labor. This is called induction, which:  Helps the uterus contract  Thins, softens and opens the cervix (opening of the uterus)  When is induction used?  There are two types of induction.  Medical induction may be done to protect the health of the parent or baby if:  There are medical concerns for you or your baby.  You haven't had your baby by 41 weeks.  Induction for non-medical reasons may be done at 39 weeks or later if:  You live far from the hospital.  You've been having contractions for many days.  You've given birth quickly in the past.   We will not perform an induction for non-medical reasons before 39 weeks. Studies show that babies born before 39 weeks may struggle with breathing, feeding, sleeping and staying warm. They are more likely to have health problems and may need to stay in the hospital longer. If we start your labor for medical reasons, the benefits will outweigh these risks. We will talk to you about your risks, benefits and alternatives (other options) before we start your labor.  How is induction done?  We may start your " "labor by doing one or more of the following:  We may need to help your cervix soften and open (sometimes called \"ripening\") to prepare it for labor. There are two ways to do this:  Medicines--these may be in the form of pills that you swallow or medicines that are put into the vagina next to the cervix.  Mechanical--using either a single or double balloon. These are small balloons that are attached to tubes that go up inside the cervix. The balloons are then filled with water to put pressure on the cervix and help the cervix soften and open. Depending on the type of balloon used, you may be allowed to go home after it is placed.  After your cervix is ready:  We may give you medicine through an IV (a small tube placed in your vein). This medicine is called Pitocin. It helps the muscles of your uterus to contract.  We may make a small opening in your bag of water (the sac around your baby). This is called an amniotomy. Sometimes called \"breaking the water\". It may help your uterus contract and your cervix open.  What might happen if my labor is induced?  Some of this depends on how your labor is started and how your body responds. Your labor may be more complicated. You and your baby may need more medical treatments. In general:  You may not go into labor right away. If so, we may send you home with follow-up instructions.  It will be important to monitor you and your baby during the induction.  You may not be able to move around during labor. You will have two belts with monitors attached to your belly. These allow the nurse to watch your contractions and your baby's heart rate.  Your labor may take longer than if you went into labor on your own. It might take more than one day.  If you need cervical ripening, it is important to know that it may be many hours (even days) until delivery happens.  Cervical ripening can be slow and require several doses before your body is ready to labor.  A long labor may increase the " risk of infection in mother and baby.  Your labor may not progress as planned. This could lead to a  birth.  Can I plan the date of my delivery?  After 39 weeks, you may ask about planning your delivery date. This is only an option if your body--and your baby--are ready. To find out, we will check your cervix and your baby's heart rate.   If you are ready to be induced, we will give you a date and time to come to the hospital. If many patients are in labor that day, we may need to start your labor at another time.  If you are not ready, we will not start your labor. It will be safer for your baby to come on its own.  What else do I need to know?  Before you have an induction, make sure you understand the reasons, risks and benefits. Ask your doctor or nurse-midwife:  Why do I need to be induced?  What are the risks to my baby?  How will you start my labor?  How will you know if my baby is ready to be born?  How will you know if my body is ready to give birth?  Where can I get more information?  To learn more about induction, you may visit these websites:  The American College of Nurse-Midwives:  www.mymidwife.org  The American College of Obstetricians and Gynecologists: www.acog.org  Childbirth Connection:  www.childbirthconnection.org  March of Dimes: www.marchofdimes.com  Association of Women's Health, Obstetrics, and  Nursing  www.imeauw9pjp.org/go-the-full-40&#047;  For informational purposes only. Not to replace the advice of your health care provider. Copyright   2008 [employer]. All rights reserved. Clinically reviewed by the  System Operations Leadership team. [employer]craft [id number] - REV .

## 2024-11-14 ENCOUNTER — HOSPITAL ENCOUNTER (INPATIENT)
Facility: CLINIC | Age: 34
End: 2024-11-14
Attending: ADVANCED PRACTICE MIDWIFE | Admitting: ADVANCED PRACTICE MIDWIFE
Payer: COMMERCIAL

## 2024-11-14 ENCOUNTER — TELEPHONE (OUTPATIENT)
Dept: MIDWIFE SERVICES | Facility: CLINIC | Age: 34
End: 2024-11-14
Payer: COMMERCIAL

## 2024-11-14 DIAGNOSIS — O13.3 GESTATIONAL HYPERTENSION, THIRD TRIMESTER: ICD-10-CM

## 2024-11-14 DIAGNOSIS — Z34.03 ENCOUNTER FOR SUPERVISION OF NORMAL FIRST PREGNANCY IN THIRD TRIMESTER: ICD-10-CM

## 2024-11-14 DIAGNOSIS — R52 POSTPARTUM PAIN: ICD-10-CM

## 2024-11-14 DIAGNOSIS — Z37.9 VACUUM-ASSISTED VAGINAL DELIVERY: Primary | ICD-10-CM

## 2024-11-14 LAB
ABO/RH(D): NORMAL
ALBUMIN MFR UR ELPH: 15.1 MG/DL
ALBUMIN SERPL BCG-MCNC: 3.5 G/DL (ref 3.5–5.2)
ALP SERPL-CCNC: 173 U/L (ref 40–150)
ALT SERPL W P-5'-P-CCNC: 23 U/L (ref 0–50)
ANION GAP SERPL CALCULATED.3IONS-SCNC: 12 MMOL/L (ref 7–15)
ANTIBODY SCREEN: NEGATIVE
AST SERPL W P-5'-P-CCNC: 25 U/L (ref 0–45)
BILIRUB SERPL-MCNC: <0.2 MG/DL
BUN SERPL-MCNC: 10.9 MG/DL (ref 6–20)
CALCIUM SERPL-MCNC: 9 MG/DL (ref 8.8–10.4)
CHLORIDE SERPL-SCNC: 104 MMOL/L (ref 98–107)
CREAT SERPL-MCNC: 0.8 MG/DL (ref 0.51–0.95)
CREAT UR-MCNC: 96.1 MG/DL
EGFRCR SERPLBLD CKD-EPI 2021: >90 ML/MIN/1.73M2
ERYTHROCYTE [DISTWIDTH] IN BLOOD BY AUTOMATED COUNT: 12.6 % (ref 10–15)
GLUCOSE SERPL-MCNC: 100 MG/DL (ref 70–99)
HCO3 SERPL-SCNC: 21 MMOL/L (ref 22–29)
HCT VFR BLD AUTO: 32.5 % (ref 35–47)
HGB BLD-MCNC: 11.4 G/DL (ref 11.7–15.7)
MCH RBC QN AUTO: 29.4 PG (ref 26.5–33)
MCHC RBC AUTO-ENTMCNC: 35.1 G/DL (ref 31.5–36.5)
MCV RBC AUTO: 84 FL (ref 78–100)
PLATELET # BLD AUTO: 249 10E3/UL (ref 150–450)
POTASSIUM SERPL-SCNC: 4.1 MMOL/L (ref 3.4–5.3)
PROT SERPL-MCNC: 6.2 G/DL (ref 6.4–8.3)
PROT/CREAT 24H UR: 0.16 MG/MG CR (ref 0–0.2)
RBC # BLD AUTO: 3.88 10E6/UL (ref 3.8–5.2)
SODIUM SERPL-SCNC: 137 MMOL/L (ref 135–145)
SPECIMEN EXPIRATION DATE: NORMAL
WBC # BLD AUTO: 15.9 10E3/UL (ref 4–11)

## 2024-11-14 PROCEDURE — 120N000001 HC R&B MED SURG/OB

## 2024-11-14 PROCEDURE — 86900 BLOOD TYPING SEROLOGIC ABO: CPT | Performed by: ADVANCED PRACTICE MIDWIFE

## 2024-11-14 PROCEDURE — 85014 HEMATOCRIT: CPT | Performed by: ADVANCED PRACTICE MIDWIFE

## 2024-11-14 PROCEDURE — 250N000011 HC RX IP 250 OP 636: Performed by: ADVANCED PRACTICE MIDWIFE

## 2024-11-14 PROCEDURE — 36415 COLL VENOUS BLD VENIPUNCTURE: CPT | Performed by: ADVANCED PRACTICE MIDWIFE

## 2024-11-14 PROCEDURE — 84156 ASSAY OF PROTEIN URINE: CPT | Performed by: ADVANCED PRACTICE MIDWIFE

## 2024-11-14 PROCEDURE — 250N000013 HC RX MED GY IP 250 OP 250 PS 637: Performed by: ADVANCED PRACTICE MIDWIFE

## 2024-11-14 PROCEDURE — 82565 ASSAY OF CREATININE: CPT | Performed by: ADVANCED PRACTICE MIDWIFE

## 2024-11-14 PROCEDURE — 86780 TREPONEMA PALLIDUM: CPT | Performed by: ADVANCED PRACTICE MIDWIFE

## 2024-11-14 PROCEDURE — 86850 RBC ANTIBODY SCREEN: CPT | Performed by: ADVANCED PRACTICE MIDWIFE

## 2024-11-14 PROCEDURE — G0463 HOSPITAL OUTPT CLINIC VISIT: HCPCS

## 2024-11-14 PROCEDURE — 99221 1ST HOSP IP/OBS SF/LOW 40: CPT | Performed by: ADVANCED PRACTICE MIDWIFE

## 2024-11-14 PROCEDURE — 258N000003 HC RX IP 258 OP 636: Performed by: ADVANCED PRACTICE MIDWIFE

## 2024-11-14 PROCEDURE — 82435 ASSAY OF BLOOD CHLORIDE: CPT | Performed by: ADVANCED PRACTICE MIDWIFE

## 2024-11-14 RX ORDER — PROCHLORPERAZINE MALEATE 5 MG/1
10 TABLET ORAL EVERY 6 HOURS PRN
Status: DISCONTINUED | OUTPATIENT
Start: 2024-11-14 | End: 2024-11-15 | Stop reason: HOSPADM

## 2024-11-14 RX ORDER — LOPERAMIDE HYDROCHLORIDE 2 MG/1
4 CAPSULE ORAL
Status: DISCONTINUED | OUTPATIENT
Start: 2024-11-14 | End: 2024-11-15 | Stop reason: HOSPADM

## 2024-11-14 RX ORDER — CALCIUM GLUCONATE 94 MG/ML
1 INJECTION, SOLUTION INTRAVENOUS
Status: DISCONTINUED | OUTPATIENT
Start: 2024-11-14 | End: 2024-11-19 | Stop reason: HOSPADM

## 2024-11-14 RX ORDER — METOCLOPRAMIDE HYDROCHLORIDE 5 MG/ML
10 INJECTION INTRAMUSCULAR; INTRAVENOUS EVERY 6 HOURS PRN
Status: DISCONTINUED | OUTPATIENT
Start: 2024-11-14 | End: 2024-11-15 | Stop reason: HOSPADM

## 2024-11-14 RX ORDER — IBUPROFEN 400 MG/1
800 TABLET, FILM COATED ORAL
Status: COMPLETED | OUTPATIENT
Start: 2024-11-14 | End: 2024-11-15

## 2024-11-14 RX ORDER — MAGNESIUM SULFATE HEPTAHYDRATE 40 MG/ML
2 INJECTION, SOLUTION INTRAVENOUS
Status: DISCONTINUED | OUTPATIENT
Start: 2024-11-14 | End: 2024-11-19 | Stop reason: HOSPADM

## 2024-11-14 RX ORDER — METHYLERGONOVINE MALEATE 0.2 MG/ML
200 INJECTION INTRAVENOUS
Status: DISCONTINUED | OUTPATIENT
Start: 2024-11-14 | End: 2024-11-15 | Stop reason: HOSPADM

## 2024-11-14 RX ORDER — ACETAMINOPHEN 325 MG/1
650 TABLET ORAL EVERY 4 HOURS PRN
Status: DISCONTINUED | OUTPATIENT
Start: 2024-11-14 | End: 2024-11-15 | Stop reason: HOSPADM

## 2024-11-14 RX ORDER — LIDOCAINE 40 MG/G
CREAM TOPICAL
Status: DISCONTINUED | OUTPATIENT
Start: 2024-11-14 | End: 2024-11-19 | Stop reason: HOSPADM

## 2024-11-14 RX ORDER — NALOXONE HYDROCHLORIDE 0.4 MG/ML
0.4 INJECTION, SOLUTION INTRAMUSCULAR; INTRAVENOUS; SUBCUTANEOUS
Status: DISCONTINUED | OUTPATIENT
Start: 2024-11-14 | End: 2024-11-15 | Stop reason: HOSPADM

## 2024-11-14 RX ORDER — CARBOPROST TROMETHAMINE 250 UG/ML
250 INJECTION, SOLUTION INTRAMUSCULAR
Status: DISCONTINUED | OUTPATIENT
Start: 2024-11-14 | End: 2024-11-15 | Stop reason: HOSPADM

## 2024-11-14 RX ORDER — METOCLOPRAMIDE 10 MG/1
10 TABLET ORAL EVERY 6 HOURS PRN
Status: DISCONTINUED | OUTPATIENT
Start: 2024-11-14 | End: 2024-11-15 | Stop reason: HOSPADM

## 2024-11-14 RX ORDER — NIFEDIPINE 30 MG/1
30 TABLET, EXTENDED RELEASE ORAL DAILY
Status: DISCONTINUED | OUTPATIENT
Start: 2024-11-14 | End: 2024-11-14

## 2024-11-14 RX ORDER — ONDANSETRON 4 MG/1
4 TABLET, ORALLY DISINTEGRATING ORAL EVERY 6 HOURS PRN
Status: DISCONTINUED | OUTPATIENT
Start: 2024-11-14 | End: 2024-11-15 | Stop reason: HOSPADM

## 2024-11-14 RX ORDER — LOPERAMIDE HYDROCHLORIDE 2 MG/1
2 CAPSULE ORAL
Status: DISCONTINUED | OUTPATIENT
Start: 2024-11-14 | End: 2024-11-15 | Stop reason: HOSPADM

## 2024-11-14 RX ORDER — NALOXONE HYDROCHLORIDE 0.4 MG/ML
0.2 INJECTION, SOLUTION INTRAMUSCULAR; INTRAVENOUS; SUBCUTANEOUS
Status: DISCONTINUED | OUTPATIENT
Start: 2024-11-14 | End: 2024-11-15 | Stop reason: HOSPADM

## 2024-11-14 RX ORDER — NIFEDIPINE 30 MG/1
30 TABLET, EXTENDED RELEASE ORAL DAILY
Status: DISCONTINUED | OUTPATIENT
Start: 2024-11-14 | End: 2024-11-17

## 2024-11-14 RX ORDER — MAGNESIUM SULFATE HEPTAHYDRATE 40 MG/ML
4 INJECTION, SOLUTION INTRAVENOUS
Status: DISCONTINUED | OUTPATIENT
Start: 2024-11-14 | End: 2024-11-19 | Stop reason: HOSPADM

## 2024-11-14 RX ORDER — TRANEXAMIC ACID 10 MG/ML
1 INJECTION, SOLUTION INTRAVENOUS EVERY 30 MIN PRN
Status: DISCONTINUED | OUTPATIENT
Start: 2024-11-14 | End: 2024-11-15 | Stop reason: HOSPADM

## 2024-11-14 RX ORDER — ONDANSETRON 2 MG/ML
4 INJECTION INTRAMUSCULAR; INTRAVENOUS EVERY 6 HOURS PRN
Status: DISCONTINUED | OUTPATIENT
Start: 2024-11-14 | End: 2024-11-15 | Stop reason: HOSPADM

## 2024-11-14 RX ORDER — KETOROLAC TROMETHAMINE 30 MG/ML
30 INJECTION, SOLUTION INTRAMUSCULAR; INTRAVENOUS
Status: COMPLETED | OUTPATIENT
Start: 2024-11-14 | End: 2024-11-15

## 2024-11-14 RX ORDER — MAGNESIUM SULFATE IN WATER 40 MG/ML
2 INJECTION, SOLUTION INTRAVENOUS CONTINUOUS
Status: DISCONTINUED | OUTPATIENT
Start: 2024-11-14 | End: 2024-11-19 | Stop reason: HOSPADM

## 2024-11-14 RX ORDER — SODIUM CHLORIDE, SODIUM LACTATE, POTASSIUM CHLORIDE, CALCIUM CHLORIDE 600; 310; 30; 20 MG/100ML; MG/100ML; MG/100ML; MG/100ML
10-125 INJECTION, SOLUTION INTRAVENOUS CONTINUOUS
Status: DISCONTINUED | OUTPATIENT
Start: 2024-11-14 | End: 2024-11-19 | Stop reason: HOSPADM

## 2024-11-14 RX ORDER — MAGNESIUM SULFATE HEPTAHYDRATE 40 MG/ML
4 INJECTION, SOLUTION INTRAVENOUS ONCE
Status: COMPLETED | OUTPATIENT
Start: 2024-11-14 | End: 2024-11-14

## 2024-11-14 RX ORDER — FENTANYL CITRATE 50 UG/ML
100 INJECTION, SOLUTION INTRAMUSCULAR; INTRAVENOUS
Status: DISCONTINUED | OUTPATIENT
Start: 2024-11-14 | End: 2024-11-15 | Stop reason: HOSPADM

## 2024-11-14 RX ORDER — HYDRALAZINE HYDROCHLORIDE 20 MG/ML
10 INJECTION INTRAMUSCULAR; INTRAVENOUS
Status: DISCONTINUED | OUTPATIENT
Start: 2024-11-14 | End: 2024-11-19 | Stop reason: HOSPADM

## 2024-11-14 RX ORDER — MISOPROSTOL 200 UG/1
800 TABLET ORAL
Status: DISCONTINUED | OUTPATIENT
Start: 2024-11-14 | End: 2024-11-15 | Stop reason: HOSPADM

## 2024-11-14 RX ORDER — OXYTOCIN 10 [USP'U]/ML
10 INJECTION, SOLUTION INTRAMUSCULAR; INTRAVENOUS
Status: DISCONTINUED | OUTPATIENT
Start: 2024-11-14 | End: 2024-11-19 | Stop reason: HOSPADM

## 2024-11-14 RX ORDER — OXYTOCIN 10 [USP'U]/ML
10 INJECTION, SOLUTION INTRAMUSCULAR; INTRAVENOUS
Status: DISCONTINUED | OUTPATIENT
Start: 2024-11-14 | End: 2024-11-15 | Stop reason: HOSPADM

## 2024-11-14 RX ORDER — OXYTOCIN/0.9 % SODIUM CHLORIDE 30/500 ML
340 PLASTIC BAG, INJECTION (ML) INTRAVENOUS CONTINUOUS PRN
Status: DISCONTINUED | OUTPATIENT
Start: 2024-11-14 | End: 2024-11-15 | Stop reason: HOSPADM

## 2024-11-14 RX ORDER — CITRIC ACID/SODIUM CITRATE 334-500MG
30 SOLUTION, ORAL ORAL
Status: DISCONTINUED | OUTPATIENT
Start: 2024-11-14 | End: 2024-11-15 | Stop reason: HOSPADM

## 2024-11-14 RX ORDER — MISOPROSTOL 200 UG/1
400 TABLET ORAL
Status: DISCONTINUED | OUTPATIENT
Start: 2024-11-14 | End: 2024-11-15 | Stop reason: HOSPADM

## 2024-11-14 RX ORDER — MAGNESIUM SULFATE HEPTAHYDRATE 40 MG/ML
2 INJECTION, SOLUTION INTRAVENOUS ONCE
Status: COMPLETED | OUTPATIENT
Start: 2024-11-14 | End: 2024-11-14

## 2024-11-14 RX ORDER — LABETALOL HYDROCHLORIDE 5 MG/ML
20-80 INJECTION, SOLUTION INTRAVENOUS EVERY 10 MIN PRN
Status: DISCONTINUED | OUTPATIENT
Start: 2024-11-14 | End: 2024-11-19 | Stop reason: HOSPADM

## 2024-11-14 RX ORDER — OXYTOCIN/0.9 % SODIUM CHLORIDE 30/500 ML
100-340 PLASTIC BAG, INJECTION (ML) INTRAVENOUS CONTINUOUS PRN
Status: DISCONTINUED | OUTPATIENT
Start: 2024-11-14 | End: 2024-11-19 | Stop reason: HOSPADM

## 2024-11-14 RX ADMIN — MAGNESIUM SULFATE HEPTAHYDRATE 2 G/HR: 40 INJECTION, SOLUTION INTRAVENOUS at 19:00

## 2024-11-14 RX ADMIN — NIFEDIPINE 30 MG: 30 TABLET, FILM COATED, EXTENDED RELEASE ORAL at 19:16

## 2024-11-14 RX ADMIN — MAGNESIUM SULFATE HEPTAHYDRATE 2 G: 40 INJECTION, SOLUTION INTRAVENOUS at 18:46

## 2024-11-14 RX ADMIN — LABETALOL HYDROCHLORIDE 20 MG: 5 INJECTION INTRAVENOUS at 17:56

## 2024-11-14 RX ADMIN — LABETALOL HYDROCHLORIDE 40 MG: 5 INJECTION INTRAVENOUS at 21:51

## 2024-11-14 RX ADMIN — LABETALOL HYDROCHLORIDE 40 MG: 5 INJECTION INTRAVENOUS at 18:06

## 2024-11-14 RX ADMIN — MAGNESIUM SULFATE IN WATER FOR 4 G: 40 INJECTION INTRAVENOUS at 18:23

## 2024-11-14 RX ADMIN — LABETALOL HYDROCHLORIDE 20 MG: 5 INJECTION INTRAVENOUS at 21:39

## 2024-11-14 RX ADMIN — SODIUM CHLORIDE, POTASSIUM CHLORIDE, SODIUM LACTATE AND CALCIUM CHLORIDE 25 ML/HR: 600; 310; 30; 20 INJECTION, SOLUTION INTRAVENOUS at 18:28

## 2024-11-14 ASSESSMENT — ACTIVITIES OF DAILY LIVING (ADL)
ADLS_ACUITY_SCORE: 0

## 2024-11-14 ASSESSMENT — COLUMBIA-SUICIDE SEVERITY RATING SCALE - C-SSRS
6. HAVE YOU EVER DONE ANYTHING, STARTED TO DO ANYTHING, OR PREPARED TO DO ANYTHING TO END YOUR LIFE?: NO
1. IN THE PAST MONTH, HAVE YOU WISHED YOU WERE DEAD OR WISHED YOU COULD GO TO SLEEP AND NOT WAKE UP?: NO
2. HAVE YOU ACTUALLY HAD ANY THOUGHTS OF KILLING YOURSELF IN THE PAST MONTH?: NO

## 2024-11-14 NOTE — PLAN OF CARE
Pt comes to triage with c/o vaginal bleeding since 0500, contractions since 1245, and headache.    EUM/US applied. VSS +fetal movement noted. Admission database obtained and prenatal record reviewed.  Noted swelling in legs over last few weeks.  Pt denies epigastric pain, or visual changes.

## 2024-11-14 NOTE — PROVIDER NOTIFICATION
11/14/24 1505   Provider Notification   Provider Name/Title Natalie ZAVALA   Method of Notification Electronic Page   Request Evaluate - Remote   Notification Reason Status Update;TOYA Estrada CNM called back updated on pt status, contracgtion pattern, vaginal bleeding, FHT's, and BP's.  Plan to admit to labor and delivery.  WALTER Bloom to place orders.

## 2024-11-14 NOTE — PLAN OF CARE
Data: Patient admitted to room 215 at 1525. Patient is a . Prenatal record reviewed.   OB History    Para Term  AB Living   1 0 0 0 0 0   SAB IAB Ectopic Multiple Live Births   0 0 0 0 0      # Outcome Date GA Lbr Josh/2nd Weight Sex Type Anes PTL Lv   1 Current            .  Medical History:   Past Medical History:   Diagnosis Date    History of colonic polyps     dx age 12, multiple removed, benign   .  Gestational age 41w1d. Vital signs per doc flowsheet. Fetal movement present. Patient reports Vaginal Bleeding   as reason for admission. Support persons Junito present.  Action: Report from Aminta ELLIOTT RN obtained at 1515. Care of patient assumed at 1525. Verbal consent for EFM, external fetal monitors applied. Admission assessment completed. Patient and support persons educated on labor process. Patient instructed to report change in fetal movement, contractions, vaginal leaking of fluid or bleeding, abdominal pain, or any concerns related to the pregnancy to her nurse/physician. Patient oriented to room, call light in reach.   Response: Sharita Bloom CNM informed of patient admission. Patient verbalized understanding of education and verbalized agreement with current treatment plan.

## 2024-11-14 NOTE — TELEPHONE ENCOUNTER
Caller reporting the following red-flag symptom(s): pregnant pt in labor    Per the system red-flag symptom policy, patient was instructed to:  speak with a Registered Nurse    Action:  Patient warm transferred to a Registered Nurse

## 2024-11-14 NOTE — H&P
[facility]   H&P    Selina Cerrato MRN# [id number]   Age: 34 year old  Gestational age: 41w1d YOB: 1990       Date of Admission: 2024          Subjective:         Selina Cerrato is a 34 year old  with an IUP at 41w1d  Partner/support Person: Junito  [facility]  Provider: Lilliana    Selina states contractions started irregularly this morning. At about 12:45 her contractions started to become more regular. She was initially spotting, but the bleeding continued and she did soak through a panty liner with bright red blood. She stated the bleeding has reduced since she has been in the hospital.     She initially had a headache. This is new for her in pregnancy. But when offered Tylenol on admission, she said the headache was gone.     Obstetrical history  Estimated Date of Delivery: 2024 determined by LMP  Patient's last menstrual period was 2024 (exact date).   Dating U/S: 24    Fetal anatomic survey: Normal  Placenta: posterior    PRENATAL COURSE  Prenatal care began at 8w6d gestation for a total of 15 prenatal visits.  Total wt gain 44 lbs; Body mass index is 35.4 kg/m .  Prenatal Blood Pressure: Normotensive until first BP taken at yesterday's prenatal appointment (138/90); second BP was 122/86.  Prenatal course was complicated by (1) Rh negative blood type (2) Anxiety   Tdap: 24  RSV: 10/9/24  Flu: 10/9/24    Patient Active Problem List   Diagnosis    History of indigestion    Supervision of high risk pregnancy in third trimester    History of anxiety state    Need for rhogam due to Rh negative mother    Encounter for supervision of normal first pregnancy in third trimester    Gestational hypertension, third trimester       HISTORY  Allergies   Allergen Reactions    Cefaclor Rash     Pt states she had allergy as a infant (Rash)        Past Medical History:   Diagnosis Date    History of colonic polyps     dx age 12,  "multiple removed, benign     Past Surgical History:   Procedure Laterality Date    TONSILLECTOMY & ADENOIDECTOMY      childhood     Family History   Problem Relation Age of Onset    Hypertension Mother     Hyperlipidemia Mother         only borderline to high    Hyperlipidemia Father         only borderline to high    Diabetes Maternal Grandmother     Cerebrovascular Disease Maternal Grandmother     Bladder Cancer Maternal Grandfather     Prostate Cancer Maternal Grandfather     Lymphoma Paternal Grandmother     Cerebrovascular Disease Paternal Grandmother         only mini    Other Cancer Paternal Grandmother         NH lymphoma     Social History     Tobacco Use    Smoking status: Never     Passive exposure: Never    Smokeless tobacco: Never   Substance Use Topics    Alcohol use: Not Currently     OB History    Para Term  AB Living   1 0 0 0 0 0   SAB IAB Ectopic Multiple Live Births   0 0 0 0 0      # Outcome Date GA Lbr Josh/2nd Weight Sex Type Anes PTL Lv   1 Current              GBS Status: negative  Rubella: Immune    HIV: Non-Reactive   1hr GCT:  115    ROS   Pt is alert and oriented  Pt denies significant constitutional symptoms including fever and/or malaise.    Pt denies significant respiratory, cardiovacular, GI, or muscular/skeletal complaints.    Neuro: Denies HA at this time and visual changes  Muscoloskeletal: Denies except for discomforts r/t pregnancy           Objective:          Patient Vitals for the past 24 hrs:   BP Temp Temp src Height Weight   24 1617 (!) 156/76 -- -- -- --   24 1547 (!) 161/89 -- -- -- --   24 1532 (!) 156/89 -- -- -- --   24 1505 (!) 158/83 -- -- -- --   24 1450 (!) 150/86 -- -- -- --   24 1440 -- -- -- 1.702 m (5' 7\") 102.5 kg (226 lb)   24 1435 -- 98.2  F (36.8  C) Temporal -- --     Temp:  [98.2  F (36.8  C)] 98.2  F (36.8  C)  BP: (150-161)/(76-89) 156/76    Results for orders placed or performed during the " hospital encounter of 11/14/24 (from the past 24 hours)   ABO/Rh type and screen    Narrative    The following orders were created for panel order ABO/Rh type and screen.  Procedure                               Abnormality         Status                     ---------                               -----------         ------                     Adult Type and Screen[[id number]]                            Preliminary result           Please view results for these tests on the individual orders.   CBC with platelets   Result Value Ref Range    WBC Count 15.9 (H) 4.0 - 11.0 10e3/uL    RBC Count 3.88 3.80 - 5.20 10e6/uL    Hemoglobin 11.4 (L) 11.7 - 15.7 g/dL    Hematocrit 32.5 (L) 35.0 - 47.0 %    MCV 84 78 - 100 fL    MCH 29.4 26.5 - 33.0 pg    MCHC 35.1 31.5 - 36.5 g/dL    RDW 12.6 10.0 - 15.0 %    Platelet Count 249 150 - 450 10e3/uL   Comprehensive metabolic panel   Result Value Ref Range    Sodium 137 135 - 145 mmol/L    Potassium 4.1 3.4 - 5.3 mmol/L    Carbon Dioxide (CO2) 21 (L) 22 - 29 mmol/L    Anion Gap 12 7 - 15 mmol/L    Urea Nitrogen 10.9 6.0 - 20.0 mg/dL    Creatinine 0.80 0.51 - 0.95 mg/dL    GFR Estimate >90 >60 mL/min/1.73m2    Calcium 9.0 8.8 - 10.4 mg/dL    Chloride 104 98 - 107 mmol/L    Glucose 100 (H) 70 - 99 mg/dL    Alkaline Phosphatase 173 (H) 40 - 150 U/L    AST 25 0 - 45 U/L    ALT 23 0 - 50 U/L    Protein Total 6.2 (L) 6.4 - 8.3 g/dL    Albumin 3.5 3.5 - 5.2 g/dL    Bilirubin Total <0.2 <=1.2 mg/dL   Adult Type and Screen   Result Value Ref Range    SPECIMEN EXPIRATION DATE [id number]        Gen: Well-appearing, NAD  Abdomen: Gravid, Soft, Non-tender   LE: 2+ edema, nontender.     LABS (Last ten results)   CBC  Recent Labs   Lab 11/14/24  1550   WBC 15.9*   HGB 11.4*   HCT 32.5*           Recent Labs   Lab 11/14/24  1550   AST 25   ALT 23   CR 0.80     Contractions: Pt is felix every 2-4 minutes, lasting 50-90 seconds and palpates mild    Fetal heart tones: Baseline 145    Variability: moderate   Accelerations: absent  Decelerations: absent    EFM: Category I but non reactive at this time    Cervix: 3/ 70-80%/ Mid/  -1, Vtx (per triage RN)  Bloody show: yes bright red blood  Membranes:  intact          Assessment/Plan:          34 year old y.o.  with titus IUP at 41w1d admitted for spontaneous labor         1) Gestational hypertension: Will implement hypertensive protocol if criteria met. Discussed with Clifton and Junito labs drawn and that medications may need to be given. Continuous monitoring required.  ALT/AST/platelets WNL. PCR pending.   2) Increased vaginal bleeding in labor: Reviewed what a placental abruption is and that would indicate a  section. We will continue to monitor bleeding. Discussed with RNs to notify me if they are worried or want me to see bleeding amounts.   3) Rh negative in pregnancy: Will administer RhoGam postpartum if indicated.  4) Anxiety    Clifton is planning on an unmedicated delivery. We reviewed that I will not force her to get an epidural, but epidurals can have positive effects on elevated blood pressures. She wanted a low interventional birth, but is understanding why her plans must change. I repeated our #1 goal is healthy mom and baby and our #2 goals is birth preferences. We will try to help her move as much as she wants.    Clifton is aware that I am available for labor support at any time. I encouraged her to rest until contractions become more intense.     Plan to check in with her by  or when clinically indicated.      MARIA D Gayle CNM  [facility] Obstetrics and Gynecology  [facility]

## 2024-11-14 NOTE — TELEPHONE ENCOUNTER
Routing pt [employer] message to provider to advise.  FYI only  Toan Phillips RN on 11/14/2024 at 11:40 AM   [facility]

## 2024-11-14 NOTE — TELEPHONE ENCOUNTER
Routing pt [email or web address] message to provider to advise.  Toan Phillips RN on 11/14/2024 at 9:12 AM   [facility]

## 2024-11-14 NOTE — PROGRESS NOTES
RN notified me of another severe range BP of 168/77 at 1730. Labetalol ordered with severe hypertension protocol. Additionally 30 mg nifedipine p.o. every morning will be started.    I talked with Clifton and Junito about the situation. I explained the medications that will administered. We discussed that if another severe range BP is noted, then I will start magnesium sulfate. We discussed reasons for the medication and side effects. PCR was WNL.     Sharita Bloom, SHERIDAN, APRN, CNM

## 2024-11-14 NOTE — TELEPHONE ENCOUNTER
41w1d    This morning woke up w some spotting - like beginning of period. Just a streak on her underwear.  Cramping this morning - not able to time yet.  No LOF  Reports active FM    Could very well be early labor signs w her cramping as well.  RN discussed labor signs, when to call. Discussed what spotting is most likely from but she is to call back with any heavier bleeding - filling the liner, severe pain, LOF and reviewed labor and what to look for.    Pt verbalized understanding, in agreement with plan, and voiced no further questions.    Lety Sue RN on 11/14/2024 at 9:41 AM

## 2024-11-14 NOTE — TELEPHONE ENCOUNTER
Caller reporting the following red-flag symptom(s): Pt is 41w and started spotting    Per the system red-flag symptom policy, patient was instructed to:  speak with a Registered Nurse    Action:  Patient warm transferred to a Registered Nurse

## 2024-11-14 NOTE — TELEPHONE ENCOUNTER
Patient calling back   She reporting further bleeding - filled a liner of bright red blood which went onto her underwear and making her nervous.  Definitely having contractions that are lasting 1 minute each and almost every 1-2 minutes for last 1/2 hour.  Does not sound in distress, still doing well w contractions. Admits the bleeding is making her nervous and would be willing to get checked out in MAC.    Reports FM. No LOF.    RN directed pt to MAC for evaluation of bleeding and r/o labor. Explained to MAC pt coming and above. Contact TITO Bloom [employer] for orders.    Pt verbalized understanding, in agreement with plan, and voiced no further questions.    Lety Sue RN on 11/14/2024 at 1:37 PM

## 2024-11-15 ENCOUNTER — ANESTHESIA EVENT (OUTPATIENT)
Dept: OBGYN | Facility: CLINIC | Age: 34
End: 2024-11-15
Payer: COMMERCIAL

## 2024-11-15 ENCOUNTER — ANESTHESIA (OUTPATIENT)
Dept: OBGYN | Facility: CLINIC | Age: 34
End: 2024-11-15
Payer: COMMERCIAL

## 2024-11-15 PROBLEM — Z37.9 VACUUM-ASSISTED VAGINAL DELIVERY: Status: ACTIVE | Noted: 2024-11-15

## 2024-11-15 LAB
PLATELET # BLD AUTO: 242 10E3/UL (ref 150–450)
T PALLIDUM AB SER QL: NONREACTIVE

## 2024-11-15 PROCEDURE — 722N000001 HC LABOR CARE VAGINAL DELIVERY SINGLE

## 2024-11-15 PROCEDURE — 250N000009 HC RX 250: Performed by: ADVANCED PRACTICE MIDWIFE

## 2024-11-15 PROCEDURE — 250N000013 HC RX MED GY IP 250 OP 250 PS 637: Performed by: ADVANCED PRACTICE MIDWIFE

## 2024-11-15 PROCEDURE — 85049 AUTOMATED PLATELET COUNT: CPT | Performed by: ANESTHESIOLOGY

## 2024-11-15 PROCEDURE — 250N000011 HC RX IP 250 OP 636: Performed by: ANESTHESIOLOGY

## 2024-11-15 PROCEDURE — 120N000012 HC R&B POSTPARTUM

## 2024-11-15 PROCEDURE — 59300 EPISIOTOMY OR VAGINAL REPAIR: CPT | Performed by: ADVANCED PRACTICE MIDWIFE

## 2024-11-15 PROCEDURE — 999N000016 HC STATISTIC ATTENDANCE AT DELIVERY

## 2024-11-15 PROCEDURE — 0KQM0ZZ REPAIR PERINEUM MUSCLE, OPEN APPROACH: ICD-10-PCS | Performed by: OBSTETRICS & GYNECOLOGY

## 2024-11-15 PROCEDURE — 258N000003 HC RX IP 258 OP 636: Performed by: ANESTHESIOLOGY

## 2024-11-15 PROCEDURE — 59400 OBSTETRICAL CARE: CPT | Performed by: ANESTHESIOLOGY

## 2024-11-15 PROCEDURE — 258N000003 HC RX IP 258 OP 636: Performed by: ADVANCED PRACTICE MIDWIFE

## 2024-11-15 PROCEDURE — 250N000011 HC RX IP 250 OP 636: Performed by: ADVANCED PRACTICE MIDWIFE

## 2024-11-15 PROCEDURE — 370N000003 HC ANESTHESIA WARD SERVICE: Performed by: ANESTHESIOLOGY

## 2024-11-15 RX ORDER — ONDANSETRON 4 MG/1
4 TABLET, ORALLY DISINTEGRATING ORAL EVERY 6 HOURS PRN
Status: DISCONTINUED | OUTPATIENT
Start: 2024-11-15 | End: 2024-11-15 | Stop reason: HOSPADM

## 2024-11-15 RX ORDER — ROPIVACAINE HYDROCHLORIDE 2 MG/ML
10 INJECTION, SOLUTION EPIDURAL; INFILTRATION; PERINEURAL ONCE
Status: DISCONTINUED | OUTPATIENT
Start: 2024-11-15 | End: 2024-11-15 | Stop reason: HOSPADM

## 2024-11-15 RX ORDER — MISOPROSTOL 200 UG/1
800 TABLET ORAL
Status: DISCONTINUED | OUTPATIENT
Start: 2024-11-15 | End: 2024-11-19 | Stop reason: HOSPADM

## 2024-11-15 RX ORDER — EPHEDRINE SULFATE 50 MG/ML
5 INJECTION, SOLUTION INTRAMUSCULAR; INTRAVENOUS; SUBCUTANEOUS
Status: DISCONTINUED | OUTPATIENT
Start: 2024-11-15 | End: 2024-11-15 | Stop reason: HOSPADM

## 2024-11-15 RX ORDER — CARBOPROST TROMETHAMINE 250 UG/ML
250 INJECTION, SOLUTION INTRAMUSCULAR
Status: DISCONTINUED | OUTPATIENT
Start: 2024-11-15 | End: 2024-11-19 | Stop reason: HOSPADM

## 2024-11-15 RX ORDER — METHYLERGONOVINE MALEATE 0.2 MG/ML
200 INJECTION INTRAVENOUS
Status: DISCONTINUED | OUTPATIENT
Start: 2024-11-15 | End: 2024-11-19 | Stop reason: HOSPADM

## 2024-11-15 RX ORDER — LIDOCAINE 40 MG/G
CREAM TOPICAL
Status: DISCONTINUED | OUTPATIENT
Start: 2024-11-15 | End: 2024-11-15 | Stop reason: HOSPADM

## 2024-11-15 RX ORDER — LOPERAMIDE HYDROCHLORIDE 2 MG/1
2 CAPSULE ORAL
Status: DISCONTINUED | OUTPATIENT
Start: 2024-11-15 | End: 2024-11-19 | Stop reason: HOSPADM

## 2024-11-15 RX ORDER — NALBUPHINE HYDROCHLORIDE 10 MG/ML
2.5-5 INJECTION INTRAMUSCULAR; INTRAVENOUS; SUBCUTANEOUS EVERY 6 HOURS PRN
Status: DISCONTINUED | OUTPATIENT
Start: 2024-11-15 | End: 2024-11-19 | Stop reason: HOSPADM

## 2024-11-15 RX ORDER — ROPIVACAINE HYDROCHLORIDE 2 MG/ML
INJECTION, SOLUTION EPIDURAL; INFILTRATION; PERINEURAL
Status: COMPLETED | OUTPATIENT
Start: 2024-11-15 | End: 2024-11-15

## 2024-11-15 RX ORDER — LOPERAMIDE HYDROCHLORIDE 2 MG/1
4 CAPSULE ORAL
Status: DISCONTINUED | OUTPATIENT
Start: 2024-11-15 | End: 2024-11-19 | Stop reason: HOSPADM

## 2024-11-15 RX ORDER — ACETAMINOPHEN 325 MG/1
650 TABLET ORAL EVERY 4 HOURS PRN
Status: DISCONTINUED | OUTPATIENT
Start: 2024-11-15 | End: 2024-11-19 | Stop reason: HOSPADM

## 2024-11-15 RX ORDER — DOCUSATE SODIUM 100 MG/1
100 CAPSULE, LIQUID FILLED ORAL DAILY
Status: DISCONTINUED | OUTPATIENT
Start: 2024-11-15 | End: 2024-11-19 | Stop reason: HOSPADM

## 2024-11-15 RX ORDER — OXYTOCIN 10 [USP'U]/ML
10 INJECTION, SOLUTION INTRAMUSCULAR; INTRAVENOUS
Status: DISCONTINUED | OUTPATIENT
Start: 2024-11-15 | End: 2024-11-19 | Stop reason: HOSPADM

## 2024-11-15 RX ORDER — TRANEXAMIC ACID 10 MG/ML
1 INJECTION, SOLUTION INTRAVENOUS EVERY 30 MIN PRN
Status: DISCONTINUED | OUTPATIENT
Start: 2024-11-15 | End: 2024-11-19 | Stop reason: HOSPADM

## 2024-11-15 RX ORDER — ONDANSETRON 2 MG/ML
4 INJECTION INTRAMUSCULAR; INTRAVENOUS EVERY 6 HOURS PRN
Status: DISCONTINUED | OUTPATIENT
Start: 2024-11-15 | End: 2024-11-15 | Stop reason: HOSPADM

## 2024-11-15 RX ORDER — TERBUTALINE SULFATE 1 MG/ML
0.25 INJECTION, SOLUTION SUBCUTANEOUS
Status: DISCONTINUED | OUTPATIENT
Start: 2024-11-15 | End: 2024-11-15 | Stop reason: HOSPADM

## 2024-11-15 RX ORDER — MISOPROSTOL 200 UG/1
400 TABLET ORAL
Status: DISCONTINUED | OUTPATIENT
Start: 2024-11-15 | End: 2024-11-19 | Stop reason: HOSPADM

## 2024-11-15 RX ORDER — BISACODYL 10 MG
10 SUPPOSITORY, RECTAL RECTAL DAILY PRN
Status: DISCONTINUED | OUTPATIENT
Start: 2024-11-15 | End: 2024-11-19 | Stop reason: HOSPADM

## 2024-11-15 RX ORDER — IBUPROFEN 400 MG/1
800 TABLET, FILM COATED ORAL EVERY 6 HOURS PRN
Status: DISCONTINUED | OUTPATIENT
Start: 2024-11-15 | End: 2024-11-19 | Stop reason: HOSPADM

## 2024-11-15 RX ORDER — OXYTOCIN/0.9 % SODIUM CHLORIDE 30/500 ML
1-24 PLASTIC BAG, INJECTION (ML) INTRAVENOUS CONTINUOUS
Status: DISCONTINUED | OUTPATIENT
Start: 2024-11-15 | End: 2024-11-15 | Stop reason: HOSPADM

## 2024-11-15 RX ORDER — OXYTOCIN/0.9 % SODIUM CHLORIDE 30/500 ML
340 PLASTIC BAG, INJECTION (ML) INTRAVENOUS CONTINUOUS PRN
Status: DISCONTINUED | OUTPATIENT
Start: 2024-11-15 | End: 2024-11-19 | Stop reason: HOSPADM

## 2024-11-15 RX ORDER — HYDROCORTISONE 25 MG/G
CREAM TOPICAL 3 TIMES DAILY PRN
Status: DISCONTINUED | OUTPATIENT
Start: 2024-11-15 | End: 2024-11-19 | Stop reason: HOSPADM

## 2024-11-15 RX ORDER — MODIFIED LANOLIN
OINTMENT (GRAM) TOPICAL
Status: DISCONTINUED | OUTPATIENT
Start: 2024-11-15 | End: 2024-11-19 | Stop reason: HOSPADM

## 2024-11-15 RX ADMIN — ACETAMINOPHEN 650 MG: 325 TABLET, FILM COATED ORAL at 20:28

## 2024-11-15 RX ADMIN — IBUPROFEN 800 MG: 400 TABLET, FILM COATED ORAL at 15:45

## 2024-11-15 RX ADMIN — ACETAMINOPHEN 650 MG: 325 TABLET, FILM COATED ORAL at 14:04

## 2024-11-15 RX ADMIN — DOCUSATE SODIUM 100 MG: 100 CAPSULE, LIQUID FILLED ORAL at 14:04

## 2024-11-15 RX ADMIN — MAGNESIUM SULFATE HEPTAHYDRATE 2 G/HR: 40 INJECTION, SOLUTION INTRAVENOUS at 13:56

## 2024-11-15 RX ADMIN — ROPIVACAINE HYDROCHLORIDE 10 ML: 2 INJECTION, SOLUTION EPIDURAL; INFILTRATION at 03:37

## 2024-11-15 RX ADMIN — ACETAMINOPHEN 650 MG: 325 TABLET, FILM COATED ORAL at 09:10

## 2024-11-15 RX ADMIN — Medication: at 03:35

## 2024-11-15 RX ADMIN — METHYLERGONOVINE MALEATE 200 MCG: 0.2 INJECTION INTRAVENOUS at 07:48

## 2024-11-15 RX ADMIN — Medication 2 MILLI-UNITS/MIN: at 02:01

## 2024-11-15 RX ADMIN — SODIUM CHLORIDE, POTASSIUM CHLORIDE, SODIUM LACTATE AND CALCIUM CHLORIDE 25 ML/HR: 600; 310; 30; 20 INJECTION, SOLUTION INTRAVENOUS at 12:37

## 2024-11-15 RX ADMIN — IBUPROFEN 800 MG: 400 TABLET, FILM COATED ORAL at 09:11

## 2024-11-15 RX ADMIN — IBUPROFEN 800 MG: 400 TABLET, FILM COATED ORAL at 21:56

## 2024-11-15 RX ADMIN — TRANEXAMIC ACID 1 G: 10 INJECTION, SOLUTION INTRAVENOUS at 07:46

## 2024-11-15 RX ADMIN — SODIUM CHLORIDE, POTASSIUM CHLORIDE, SODIUM LACTATE AND CALCIUM CHLORIDE 1000 ML: 600; 310; 30; 20 INJECTION, SOLUTION INTRAVENOUS at 03:00

## 2024-11-15 RX ADMIN — NIFEDIPINE 30 MG: 30 TABLET, FILM COATED, EXTENDED RELEASE ORAL at 09:10

## 2024-11-15 RX ADMIN — MAGNESIUM SULFATE HEPTAHYDRATE 2 G/HR: 40 INJECTION, SOLUTION INTRAVENOUS at 04:48

## 2024-11-15 NOTE — L&D DELIVERY NOTE
OB Vacuum Assisted Vaginal Delivery Summary    Called to bedside as the in-house OB due to fetal heart rate decelerations with pushing. Primary CNM Sharita Bloom is with patient and patient is pushing effectively. OB is en-route to the hospital.     In brief, patient is a  with  IUP @ 41w2d being induced for vaginal bleeding and gHTN, now with severe gHTN and on magnesium for seizure prophylaxis. Epidural in place for pain control. Pushing since 635, just under one hour. Thick meconium noted. Deep decelerations noted with pushing, with last contraction, deceleration was prolonged for 2-3 minutes (tracing intermittently broken during pushing). Observed patient pushing with a contraction, pushes to +3 station, +2 without pushing. At the end of the contraction, fetal heart rate noted to be in the 50's with slow recovery. Patient repositioned with improvement in FHT. Vacuum assisted delivery recommended. EFW 8lb. Fetal presentation:  Cephalic- direct OA..  Fetal station: +2, pushes to +3.  Verbal informed consent obtained.  Alternatives to vacuum discussed.  Risks of vacuum delivery discussed including risk of cephalohematoma, subgaleal hemorrhage, epidural hemorrhage, intercranial hemorrhage and maternal perineal laceration. Patient gave verbal consent to proceed.  NICU and OR alerted that vacuum extraction planned.  Patient's adams catheter was removed. Vacuum applied over saggital suture, 3cm anterior to posterior fontanelle.  Vacuum applied.  Pulls:  3 contractions.  Pop-offs 0.  Total time vacuum applied <3 min.  Maximum pressure used green zone. Good descent of fetal vertex with each contraction.  Vacuum removed after fetal vertex brought to a full crown.  Remainder of infant delivered without complication. A loose nuchal cord was noted, as well as thick meconium. Infant- viable, vigorous male infant delivered at 0729 on 11/15/2024, Apgars 8 and 9 at one and five minutes.  Weight pending. Pitocin was  started. Cord blood was collected. The perineum and vaginal were inspected. A small second degree laceration was noted, and will be repaired by the patient's primary CNM. I remained available on the unit in the event that further assistance was needed.       Erinn Santos MD  11/15/2024  7:37 AM

## 2024-11-15 NOTE — PLAN OF CARE
Pt ambulated to bathroom, did well.  Pt able to void.  Pads changed.  Vss, afebrile.  Pt denies h/a, blurred vision or epigastric pain.  DTR's +2/+2, no clonus.  Pt alert and tolerating magnesium sulfate well.  Breast fed and bonding well.   present and supportive at bedside.  Pt transferred to PP room 412 with  and belongings at side.  Report given to IDA Thorpe taking over.

## 2024-11-15 NOTE — ANESTHESIA PREPROCEDURE EVALUATION
Anesthesia Pre-Procedure Evaluation    Patient: Selina Cerrato   MRN: [id number] : 1990        Procedure :           Past Medical History:   Diagnosis Date    History of colonic polyps     dx age 12, multiple removed, benign      Past Surgical History:   Procedure Laterality Date    TONSILLECTOMY & ADENOIDECTOMY      childhood      Allergies   Allergen Reactions    Cefaclor Rash     Pt states she had allergy as a infant (Rash)         Social History     Tobacco Use    Smoking status: Never     Passive exposure: Never    Smokeless tobacco: Never   Substance Use Topics    Alcohol use: Not Currently      Wt Readings from Last 1 Encounters:   24 102.5 kg (226 lb)        Anesthesia Evaluation            ROS/MED HX  ENT/Pulmonary:    (-) asthma   Neurologic:  - neg neurologic ROS     Cardiovascular: Comment: Platelets rechecked prior to SAUL placement.  242.  Stable.    (+)  - - PIH and Mg ++ gtt  -  - -                                      METS/Exercise Tolerance:     Hematologic:     (+)  no anticoagulation therapy, no coagulopathy,             Musculoskeletal:       GI/Hepatic:     (+) GERD,                   Renal/Genitourinary:       Endo:       Psychiatric/Substance Use:       Infectious Disease:       Malignancy:       Other:            Physical Exam    Airway        Mallampati: II   TM distance: > 3 FB   Neck ROM: full     Respiratory Devices and Support         Dental  no notable dental history         Cardiovascular   cardiovascular exam normal          Pulmonary   pulmonary exam normal                OUTSIDE LABS:  CBC:   Lab Results   Component Value Date    WBC 15.9 (H) 2024    WBC 10.5 10/09/2024    HGB 11.4 (L) 2024    HGB 10.9 (L) 10/09/2024    HCT 32.5 (L) 2024    HCT 32.9 (L) 10/09/2024     2024     10/09/2024     BMP:   Lab Results   Component Value Date     2024     2022    POTASSIUM 4.1 2024    POTASSIUM 3.8 2022  "   CHLORIDE 104 11/14/2024    CHLORIDE 104 02/14/2022    CO2 21 (L) 11/14/2024    CO2 26 02/14/2022    BUN 10.9 11/14/2024    BUN 16 02/14/2022    CR 0.80 11/14/2024    CR 0.72 02/14/2022     (H) 11/14/2024    GLC 97 02/14/2022     COAGS: No results found for: \"PTT\", \"INR\", \"FIBR\"  POC: No results found for: \"BGM\", \"HCG\", \"HCGS\"  HEPATIC:   Lab Results   Component Value Date    ALBUMIN 3.5 11/14/2024    PROTTOTAL 6.2 (L) 11/14/2024    ALT 23 11/14/2024    AST 25 11/14/2024    ALKPHOS 173 (H) 11/14/2024    BILITOTAL <0.2 11/14/2024     OTHER:   Lab Results   Component Value Date    LEANNE 9.0 11/14/2024    TSH 1.52 02/14/2022       Anesthesia Plan    ASA Status:  3       Anesthesia Type: Epidural.              Consents    Anesthesia Plan(s) and associated risks, benefits, and realistic alternatives discussed. Questions answered and patient/representative(s) expressed understanding.     - Discussed:     - Discussed with:  Patient            Postoperative Care            Comments:    Other Comments: Orders to manage the epidural infusion have been entered, and through coordination with the nurse, we will continute to manage and monitor the patient's labor epidural.  We will continuously be available to adjust as needed thruout the entire L&D process.            Alf Ellis, DO    I have reviewed the pertinent notes and labs in the chart from the past 30 days and (re)examined the patient.  Any updates or changes from those notes are reflected in this note.               # Hypertension: Noted on problem list                     "

## 2024-11-15 NOTE — PROVIDER NOTIFICATION
Notified Sharita Bloom CNM at 1735 about patients blood pressure of 168/77. Provider arrived on unit at 1740 and discussed plan moving forward. Orders placed for hypertensive protocol. Writer administered labetalol x2, without improvement after first doses, received order for magnesium sulfate infusion, continuing with current plan of care and continuous monitoring at bedside.   11/14/24 1740   Provider Notification   Provider Name/Title Sharita Bloom CNM   Method of Notification In Department   Request Evaluate in Person   Notification Reason Status Update;Lab/Diagnostic Study;Maternal Vital Sign Change

## 2024-11-15 NOTE — L&D DELIVERY NOTE
OB Vaginal Delivery Note    Selina Cerrato MRN# [id number]   Age: 34 year old YOB: 1990     ASSESSMENT/PLAN  VAVD: Routine postpartum cares. Hgb in the morning. Plan for discharge on postpartum day 2 unless blood pressures are not under control.     Lactating mother: Support Clifton in breast feeding as needed.    Vaginal bleeding: Did not meet criteria for hemorrhage, but due to consistent blood trickle and uterine atony methergine, pitocin, and TXA given. Will monitor uterine tone and treat accordingly.     Gestational hypertension: Magnesium sulfate for 24 hours postpartum. Nifedipine 30 mg po daily. PCD ordered. Will monitor BP and adjust medications as needed. Will consult with physician back up if needed.      Rh negative in pregnancy: Rhogam workup ordered.      Anxiety: Continue to monitor. Clifton is very cognizant of her feelings.     GA: 41w2d  GP:   Labor Complications: Preeclampsia/Hypertension  Delivery QBL: 175 mL  Delivery Type: Vaginal, Vacuum (Extractor)  ROM to Delivery Time: (Delivered) Hours: 2 Minutes: 29   Weight: 3.32 kg (7 lb 5.1 oz)   1 Minute 5 Minute 10 Minute   Apgar Totals: 8   9        SUBHASH DAVILA;WATSON NORMAN;MAULIK GARCIA    Delivery Details:  Selina Cerrato, a 34 year old  female with an IUP at 41w2d gestation. She delivered a viable boy, Enma, with apgars of 8  and 9 .     She was admitted for labor. Complications included gestational hypertension with severe range pressures in labor that were treated with labetalol and nifedipine. Magnesium sulfate was given for neural protection.     She labored unmedicated and nitrous oxide until 8 centimeters before deciding to get an epidural. She was augmented with pitocin. She started pushing shortly after dilation was complete. FHT showed severe variable decelerations with a majority of contractions, but recovered to baseline without compensating. She was pushing effectively. The  in-house backup was called after a prolonged deceleration into the 50 bpm. After the in-house evaluated, a decision for a vacuum assisted delivery was made (please see her note for details). Infant delivered in vertex occiput anterior position. Anterior and posterior shoulders delivered without difficulty. The cord was clamped, cut twice and 3 vessels were noted. Cord blood was obtained in routine fashion with the following disposition: lab.      CNM took back care after delivery. A vaginal laceration located at the 6 o'clock area was repaired in the usual fashion with a 3.0 vicryl CT. The placenta delivered intact, spontaneously. The uterus would firm with massage, but then would get boggy with a constant tiny stream of blood. Since she was on magnesium sulfate and her uterus was not remaining firm, I made the decision to treat with methergine and TXA in addition to pitocin even though her blood loss was not at hemorrhage level.     NICU was present for delivery due to moderate meconium, fetal heart tones, and vacuum assisted delivery. Infant was placed on mother's chest. Mother and baby are currently stable. Infant skin to skin with mother.          Azucena Cerrato [[id number]]      Labor Event Times      Latent labor onset date/time: 2024 1245    Active labor onset date: 24 Onset time:  9:55 PM   Dilation complete date: 11/15/24 Complete time:  5:58 AM   Start pushing date/time: 11/15/2024 0635          Labor Length      1st Stage (hrs): 8 (min): 3   2nd Stage (hrs): 1 (min): 31   3rd Stage (hrs): 0 (min): 10          Labor Events     labor?: No   steroids: None  Predominate monitoring during 1st stage: continuous electronic fetal monitoring     Antibiotics received during labor?: No     Rupture identifier: Sac 1  Rupture date/time: 11/15/24 0500   Rupture type: Spontaneous Rupture of Membranes  Fluid color: Meconium  Fluid odor: Normal     Augmentation: Oxytocin  Indications for  augmentation: Hypertension       Delivery/Placenta Date and Time      Delivery Date: 11/15/24 Delivery Time:  7:29 AM   Placenta Date/Time: 11/15/2024  7:39 AM  Oxytocin given at the time of delivery: after delivery of baby  Delivering clinician: Erinn Santos MD   Other personnel present at delivery:  Provider Role   Gloria Abad, RN Registered Nurse   Leidy Sewell RN Registered Nurse   Sharita Bloom APRN CNM Certified Nurse Midwife             Vaginal Counts       Initial count performed by 2 team members:  Two Team Members   Dr Chuckie Abad RN         Needles Suture Needles Sponges (RETIRED) Instruments   Initial counts 2 0 5    Added to count  1     Relief counts       Final counts 2 1 5            Placed during labor Accounted for at the end of labor   FSE NA NA   IUPC NA NA   Cervidil NA NA                  Final count performed by 2 team members:  Two Team Members   Dr Danielle Sainz RN      Final count correct?: Yes  Pre-Birth Team Brief: Complete  Post-Birth Team Debrief: Complete       Apgars    Living status: Living   1 Minute 5 Minute 10 Minute 15 Minute 20 Minute   Skin color: 0  1       Heart rate: 2  2       Reflex irritability: 2  2       Muscle tone: 2  2       Respiratory effort: 2  2       Total: 8  9       Apgars assigned by: KRYS CLARK       Cord      Cord Complications: None               Cord Blood Disposition: Lab    Stem cell collection?: No           [place] Resuscitation    Methods: None   Care at Delivery: NNP Delivery Note    Requested by Dr. Santos to attend the delivery of this full term, male infant with a gestational age of 41 2/7 weeks secondary to meconium stained amniotic fluid born by vaginal delivery.      Infant delivered at 0729 hours on 11/15/2024. Infant had spontaneous respirations at birth with delayed cord clamping. He was placed on mom's chest, dried, stimulated, and  suctioned at birth. Apgars were 8 at one minute  "and 9 at five minutes of age. Infant was not brought to warmer for assessment due to being well crying/active. Infant was shown to mother and father and will be transferred to the [facility] for further care.    Jasmyne Liu KRYS-BC 11/15/2024 7:37 AM           Measurements      Weight: 7 lb 5.1 oz Length: 1' 9\"     Head circumference: 33 cm           Skin to Skin and Feeding Plan      Skin to skin initiation date/time: 1842    Skin to skin with: Mother  Skin to skin end date/time: 1841           Labor Events and Shoulder Dystocia    Fetal Tracing Prior to Delivery: Category 2  Shoulder dystocia present?: Neg       Delivery (Maternal) (Provider to Complete) ([id number])    Episiotomy: None  Perineal lacerations: None      Vaginal laceration?: Yes Repaired?: Yes   Repair suture: 3-0 Vicryl  Genital tract inspection done: Pos       Blood Loss  Mother: Clifton Cerrato #[id number]     Start of Mother's Information      Delivery Blood Loss   Intrapartum & Postpartum: 24 2155 - 11/15/24 1034    Delivery Admission: 24 1427 - 11/15/24 1034         Intrapartum & Postpartum Delivery Admission    Delivery QBL (mL) Hospital Encounter 175 mL 175 mL    Total  175 mL 175 mL               End of Mother's Information  Mother: Clifton Cerrato #[id number]                Delivery - Provider to Complete ([id number])    Delivering clinician: Erinn Santos MD  Delivery Type (Choose the 1 that will go to the Birth History): Vaginal, Vacuum (Extractor)    Verbal Informed Consent Obtained For Vacuum: Yes            # of Pop-Offs (vacuum): 0    Position (vacuum): OA         Indication for Operative Vaginal Delivery (vacuum): Fetal Status                Other personnel:  Provider Role   Gloria Abad RN Registered Nurse   Leidy Sewell RN Registered Nurse   Sharita Bloom APRN CNM Certified Nurse Midwife                    Placenta    Date/Time: 11/15/2024  7:39 AM  Removal: Spontaneous  Disposition: Hospital " disposal             Anesthesia    Method: Nitrous Oxide, Epidural  Cervical dilation at placement: 8-10                    Presentation and Position    Presentation: Vertex     Occiput Anterior                     MARIA D GayleM

## 2024-11-15 NOTE — PROGRESS NOTES
"CNM PROGRESS NOTE    SUBJECTIVE:  Clifton had been coping well with contractions. She is currently using nitrous oxide. She has been changing positions on the bed (currently on hands and knees). Junito is supportive at the bedside.    Clifton looks tired, but able to rest between contractions. She is still breathing deeply with contractions while using the nitrous, but also tenses a leg or holds on tightly while the contractions is at its peak.     Labetalol has been needed x4. Magnesium sulfate is currently infusing.     Contractions have spaced out. Cervical change has been gradual.   2145: 6 cm  2315: 7.5 cm  0130: 8 cm    Araceli has had 2 episodes of decelerations lasting a about 2 minutes each time. Pitocin was to be started at the time the 2nd episode started. It has been held at this time.     OBJECTIVE:  BP (!) 140/60   Temp 98.4  F (36.9  C) (Temporal)   Resp 20   Ht 1.702 m (5' 7\")   Wt 102.5 kg (226 lb)   LMP 01/31/2024 (Exact Date)   SpO2 96%   BMI 35.40 kg/m      Patient Vitals for the past 24 hrs:   BP Temp Temp src Resp SpO2 Height Weight   11/15/24 0115 (!) 140/60 -- -- -- -- -- --   11/15/24 0045 133/64 -- -- -- 96 % -- --   11/15/24 0015 131/64 -- -- -- -- -- --   11/14/24 2345 (!) 141/77 -- -- -- 95 % -- --   11/14/24 2300 (!) 143/78 -- -- -- -- -- --   11/14/24 2245 (!) 150/82 -- -- -- -- -- --   11/14/24 2230 (!) 151/72 -- -- -- 96 % -- --   11/14/24 2200 (!) 148/88 -- -- -- -- -- --   11/14/24 2145 (!) 185/100 -- -- -- -- -- --   11/14/24 2135 (!) 179/83 -- -- -- -- -- --   11/14/24 2130 (!) 178/92 -- -- -- -- -- --   11/14/24 2115 (!) 150/85 -- -- -- -- -- --   11/14/24 2110 (!) 164/87 98.4  F (36.9  C) Temporal 20 -- -- --   11/14/24 2015 128/69 -- -- 19 -- -- --   11/14/24 1945 (!) 145/81 -- -- -- -- -- --   11/14/24 1917 (!) 147/79 -- -- -- -- -- --   11/14/24 1901 -- -- -- -- 97 % -- --   11/14/24 1900 (!) 141/69 -- -- -- -- -- --   11/14/24 1856 -- -- -- -- 97 % -- --   11/14/24 1851 -- -- -- " "-- 96 % -- --   11/14/24 1846 -- -- -- -- 96 % -- --   11/14/24 1845 (!) 146/75 -- -- -- -- -- --   11/14/24 1842 -- -- -- -- 94 % -- --   11/14/24 1841 -- -- -- -- 96 % -- --   11/14/24 1837 (!) 140/75 -- -- -- -- -- --   11/14/24 1836 -- -- -- -- 95 % -- --   11/14/24 1831 -- -- -- -- 96 % -- --   11/14/24 1815 (!) 164/78 -- -- -- -- -- --   11/14/24 1800 (!) 174/80 99.2  F (37.3  C) Temporal -- -- -- --   11/14/24 1731 (!) 168/77 -- -- -- -- -- --   11/14/24 1701 (!) 143/83 -- -- -- -- -- --   11/14/24 1647 (!) 160/76 -- -- -- -- -- --   11/14/24 1617 (!) 156/76 -- -- -- -- -- --   11/14/24 1547 (!) 161/89 -- -- -- -- -- --   11/14/24 1532 (!) 156/89 -- -- -- -- -- --   11/14/24 1505 (!) 158/83 -- -- -- -- -- --   11/14/24 1450 (!) 150/86 -- -- -- -- -- --   11/14/24 1440 -- -- -- -- -- 1.702 m (5' 7\") 102.5 kg (226 lb)   11/14/24 1435 -- 98.2  F (36.8  C) Temporal -- -- -- --       Fetal heart tones: Baseline 130   Variability: moderate  Accelerations: absent  Decelerations: absent    Contractions: Pt is felix every 3-5 minutes, lasting 60 seconds and palpates moderate    Cervix: 8/ 80%/ 0 to -1, Vtx  ROM: spontaneously ruptured, clear minimal fluid     Pitocin- not started yet    ASSESSMENT/PLAN:  IUP @ 41w2d active labor   GBS- negative; membranes spontaneously ruptured; clear fluid     Gestational hypertension: Received Labetalol x4 for a total of 120 mg. Receiving Nifedipine 30 mg daily. Magnesium sulfate running.      Rh negative in pregnancy     Anxiety     Will start pitocin 20 minutes from last deceleration. Discussed with Clifton and Junito that change is being made slowly, but as long as babe tolerates pitocin we will try to augment labor.     MARIA D Gayle CNM      "

## 2024-11-15 NOTE — PROGRESS NOTES
I discussed the patient with primary CNM Sharita Bloom. Being induced after vaginal bleeding and elevated Bps (meeting initial criteria for gHTN). ANC otherwise complicated by anxiety. Intrapartum, has had severe range Bps treated with 20 and now 40 of IV labetalol. Had mild headache on admission but resolved and now asymptomatic. HELLP labs normal. Starting magnesium sulfate infusion as well as long acting Nifedipine. At this time, CNM comfortable continuing to manage and will update me if need to switch to MD care. Agree with the above treatment plan.     Elyssa Banks MD

## 2024-11-15 NOTE — PLAN OF CARE
Patient started on loading dose of magnesium at 1823. Nurse at bedside for continuous monitoring of patient.

## 2024-11-15 NOTE — TELEPHONE ENCOUNTER
Completed paperwork has been faxed to  [phone]. A copy has been scanned into patient's chart and original is filed in drawer at check out

## 2024-11-15 NOTE — PROGRESS NOTES
0312 MDA at bedside, pt positioned. Time out done  0322 procedure start  0328 catheter placed  0330 test dose  0331 bolus dose

## 2024-11-15 NOTE — PLAN OF CARE
Goal Outcome Evaluation: Pt delivered viable male via vacuum assisted delivery.

## 2024-11-15 NOTE — LACTATION NOTE
Initial Lactation visit with Clifton, FOBRODY, and baby boy. Clifton reports feeding is going ok so far. Baby had a couple good feedings after delivery and now is sleepy. Discussed  recovery period and feeding expectations. Discussed the feelings and looks of a deep latch. Discussed nipple shape immediately after baby unlatched. Encouraged to call with help positioning, latching, and latch checks.    Recommend unlimited, frequent breast feedings: At least 8 - 12 times every 24 hours. Recommended rooming in. Instructed in hand expression. Avoid pacifiers and supplementation with formula unless medically indicated. Explained benefits of holding baby skin on skin to help promote better breastfeeding outcomes. Will revisit as needed.    Amanda Benoit RN, IBCLC

## 2024-11-15 NOTE — ANESTHESIA PROCEDURE NOTES
Epidural catheter Procedure Note    Pre-Procedure   Staff -        Anesthesiologist:  Alf Ellis DO       Performed By: anesthesiologist       Location: OB       Pre-Anesthestic Checklist: patient identified, IV checked, site marked, risks and benefits discussed, informed consent, monitors and equipment checked, pre-op evaluation and at physician/surgeon's request  Timeout:       Correct Patient: Yes        Correct Procedure: Yes        Correct Site: Yes        Correct Position: Yes   Procedure Documentation  Procedure: epidural catheter       Patient Position: sitting       Skin prep: Betadine       Local skin infiltrated with 1 mL of 1% lidocaine.        Insertion Site: L2-3. (midline approach).       Technique: LORT saline and LORT air        Needle Type: Touhy needle       Needle Gauge: 17.        Needle Length (Inches): 3.5        Catheter: 19 G.          Catheter threaded easily.             # of attempts: 2 and  # of redirects:  1    Assessment/Narrative         Paresthesias: No.       Test dose of 3 mL lidocaine 1.5% w/ 1:200,000 epinephrine at.         Test dose negative, 3 minutes after injection, for signs of intravascular, subdural, or intrathecal injection.       Insertion/Infusion Method: LORT saline and LORT air       Aspiration negative for Heme or CSF via Epidural Catheter.    Medication(s) Administered   0.2% Ropivacaine (Epidural) - EPIDURAL   10 mL - 11/15/2024 3:37:00 AM   Comments:  Pre-procedure time out completed with L&D nursing staff. Patient in seated position on side of bed, the lumbar spine was prepped and draped in sterile fashion. The L2/L3 interspace was identified and local anesthetic was injected for local skin infiltration. A 17 G touhy needle was advanced to the epidural space which was confirmed with the loss of resistance technique at 7 cm. A catheter was then advanced easily into the epidural space. The catheter was left at 11 cm at the skin. No aspiration of blood  "or CSF. 3 mL test dose of 1.5% lidocaine with 1:200,000 epinephrine was injected through the catheter and was negative for intravascular injection. The site was covered with sterile tegaderm and the catheter was secured with tape.     Orders to manage the epidural infusion have been entered and, through coordination with the nurse, we will continue to manage and monitor the patient's labor epidural.  We will continuously be available to adjust as needed throughout the entire labor and delivery process.          FOR [facility] ([facility] [place]) ONLY:   Pain Team Contact information: please page the Pain Team Via Intern. Search \"Pain\". During daytime hours, please page the attending first. At night please page the resident first.      "

## 2024-11-15 NOTE — PROGRESS NOTES
0720 Dr Noguera at bedside, vacuum to be done. Yoana out  0722 NICU at bedside for vacuum delivery  0723 vac applied x3 contractions. Vacuum off in between contractions  0729 baby boy

## 2024-11-15 NOTE — PROGRESS NOTES
"CNM PROGRESS NOTE    SUBJECTIVE:  Clifton is feeling flushed and tingly after magnesium sulfate started. She is coping with contractions well. She has been feeling shaky. She states contractions are more intense, but spaced since magnesium sulfate was started. Junito is supportive holding her hand.    Vaginal bleeding has stopped.    OBJECTIVE:  BP (!) 147/79 (BP Location: Right arm, Patient Position: Semi-Patton's, Cuff Size: Adult Regular)   Temp 99.2  F (37.3  C) (Temporal)   Ht 1.702 m (5' 7\")   Wt 102.5 kg (226 lb)   LMP 01/31/2024 (Exact Date)   SpO2 97%   BMI 35.40 kg/m      Patient Vitals for the past 24 hrs:   BP Temp Temp src SpO2 Height Weight   11/14/24 1917 (!) 147/79 -- -- -- -- --   11/14/24 1901 -- -- -- 97 % -- --   11/14/24 1900 (!) 141/69 -- -- -- -- --   11/14/24 1856 -- -- -- 97 % -- --   11/14/24 1851 -- -- -- 96 % -- --   11/14/24 1846 -- -- -- 96 % -- --   11/14/24 1845 (!) 146/75 -- -- -- -- --   11/14/24 1842 -- -- -- 94 % -- --   11/14/24 1841 -- -- -- 96 % -- --   11/14/24 1837 (!) 140/75 -- -- -- -- --   11/14/24 1836 -- -- -- 95 % -- --   11/14/24 1831 -- -- -- 96 % -- --   11/14/24 1815 (!) 164/78 -- -- -- -- --   11/14/24 1800 (!) 174/80 99.2  F (37.3  C) Temporal -- -- --   11/14/24 1731 (!) 168/77 -- -- -- -- --   11/14/24 1701 (!) 143/83 -- -- -- -- --   11/14/24 1647 (!) 160/76 -- -- -- -- --   11/14/24 1617 (!) 156/76 -- -- -- -- --   11/14/24 1547 (!) 161/89 -- -- -- -- --   11/14/24 1532 (!) 156/89 -- -- -- -- --   11/14/24 1505 (!) 158/83 -- -- -- -- --   11/14/24 1450 (!) 150/86 -- -- -- -- --   11/14/24 1440 -- -- -- -- 1.702 m (5' 7\") 102.5 kg (226 lb)   11/14/24 1435 -- 98.2  F (36.8  C) Temporal -- -- --       Fetal heart tones: Baseline 130   Variability: moderate  Accelerations: absent  Decelerations: absent    Contractions: Pt is felix every 4-6 minutes, lasting 60 seconds and palpates moderate    Cervix: 5/ 70% / -1, Vtx  ROM: not " ruptured      ASSESSMENT/PLAN:  IUP @ 41w1d early labor   GBS- negative; membranes intact    Gestational hypertension: Received Labetalol x1 of 20 mg and x1 of 40 mg. Receiving Nifedipine 30 mg daily. Magnesium sulfate running. Consulted with Dr. Banks when magnesium sulfate was started; will consult with her if condition changes.     Rh negative in pregnancy    Anxiety    Next SVE will be around 2200.     MARIA D Gayle CNM

## 2024-11-15 NOTE — PLAN OF CARE
Goal Outcome Evaluation:       Patient arrived on floor at 1305 and report received from Alvina SKINNER RN.  Patient is on magnesium for high blood pressures.  Nurses verified rate.  Reflexes are WNL.  BP slightly elevated.  Fundus firm and midline.  Patient and spouse were updated on plan for shifts, safety protocols and the /Post Partum book.  Questions answered and encouraged since this is their first child.

## 2024-11-15 NOTE — PLAN OF CARE
Goal Outcome Evaluation:       Vital signs stable. Postpartum assessment WDL. Pain controlled with Tylenol and Ibuprofen. Patient voiding without difficulty. Nurse is measuring input and output.  Patient is on magnesium due to some severe range blood pressures before delivery.  Patient denies any symptoms of preeclampsia except for a mild headache (1/10 pain).  Nurse educated her on which symptoms to report.  Blood pressures are improving; see flowsheet.  Breastfeeding  with nurse assist. Patient and infant bonding well. Will continue with current plan of care.

## 2024-11-16 VITALS
BODY MASS INDEX: 35.47 KG/M2 | DIASTOLIC BLOOD PRESSURE: 69 MMHG | OXYGEN SATURATION: 100 % | WEIGHT: 226 LBS | TEMPERATURE: 98.9 F | HEIGHT: 67 IN | RESPIRATION RATE: 16 BRPM | HEART RATE: 92 BPM | SYSTOLIC BLOOD PRESSURE: 125 MMHG

## 2024-11-16 LAB — HGB BLD-MCNC: 10.9 G/DL (ref 11.7–15.7)

## 2024-11-16 PROCEDURE — 36415 COLL VENOUS BLD VENIPUNCTURE: CPT | Performed by: ADVANCED PRACTICE MIDWIFE

## 2024-11-16 PROCEDURE — 99231 SBSQ HOSP IP/OBS SF/LOW 25: CPT | Mod: 24 | Performed by: ADVANCED PRACTICE MIDWIFE

## 2024-11-16 PROCEDURE — 85018 HEMOGLOBIN: CPT | Performed by: ADVANCED PRACTICE MIDWIFE

## 2024-11-16 PROCEDURE — 250N000011 HC RX IP 250 OP 636: Performed by: ADVANCED PRACTICE MIDWIFE

## 2024-11-16 PROCEDURE — 120N000012 HC R&B POSTPARTUM

## 2024-11-16 PROCEDURE — 250N000013 HC RX MED GY IP 250 OP 250 PS 637: Performed by: ADVANCED PRACTICE MIDWIFE

## 2024-11-16 RX ADMIN — IBUPROFEN 800 MG: 400 TABLET, FILM COATED ORAL at 09:38

## 2024-11-16 RX ADMIN — ACETAMINOPHEN 650 MG: 325 TABLET, FILM COATED ORAL at 21:26

## 2024-11-16 RX ADMIN — DOCUSATE SODIUM 100 MG: 100 CAPSULE, LIQUID FILLED ORAL at 08:54

## 2024-11-16 RX ADMIN — NIFEDIPINE 30 MG: 30 TABLET, FILM COATED, EXTENDED RELEASE ORAL at 08:54

## 2024-11-16 RX ADMIN — MAGNESIUM SULFATE HEPTAHYDRATE 2 G/HR: 40 INJECTION, SOLUTION INTRAVENOUS at 00:06

## 2024-11-16 RX ADMIN — ACETAMINOPHEN 650 MG: 325 TABLET, FILM COATED ORAL at 03:44

## 2024-11-16 RX ADMIN — ACETAMINOPHEN 650 MG: 325 TABLET, FILM COATED ORAL at 09:38

## 2024-11-16 RX ADMIN — IBUPROFEN 800 MG: 400 TABLET, FILM COATED ORAL at 03:44

## 2024-11-16 RX ADMIN — IBUPROFEN 800 MG: 400 TABLET, FILM COATED ORAL at 21:26

## 2024-11-16 RX ADMIN — ACETAMINOPHEN 650 MG: 325 TABLET, FILM COATED ORAL at 16:01

## 2024-11-16 RX ADMIN — IBUPROFEN 800 MG: 400 TABLET, FILM COATED ORAL at 16:01

## 2024-11-16 NOTE — PLAN OF CARE
Goal Outcome Evaluation:       Vital signs stable. Postpartum assessment WDL. Pain controlled with Tylenol and Ibuprofen. Patient voiding without difficulty. Magnesium stopped and blood pressures are WNL.  Reflexes are 2+ with no clonus. Breastfeeding on cue . Patient and infant bonding well. Will continue with current plan of care.

## 2024-11-16 NOTE — PROGRESS NOTES
"Matt [facility] Progress Note: Postpartum Day #1    November 16, 2024  11:05 AM    SUBJECTIVE:  Patient is stable and is tolerating acitivity well  Baby is rooming in  Complications since 2 hours post delivery: none  Pain is well controlled but Clifton reports she is sore.  Patient is taking pain medications.  Breastfeeding status:initiated   Elimination:  She is voiding without difficulty.  Denies heavy bleeding and passing large clots.  Happy magnesium if off! Feels pretty good, still trying to pay attention to symptoms. Family at bedside and supportive.    OBJECTIVE:  /82 (BP Location: Right arm)   Pulse 77   Temp 98.3  F (36.8  C) (Oral)   Resp 16   Ht 1.702 m (5' 7\")   Wt 102.5 kg (226 lb)   LMP 01/31/2024 (Exact Date)   SpO2 100%   Breastfeeding Unknown   BMI 35.40 kg/m      Constitutional: healthy, alert, no distress, and smiling    Breasts: Currently breastfeeding, baby at breast during round    Per RN flowsheet  Fundus: Uterine fundus is firm, non-tender and at the level of the umbilicus    Perineum: Perineum is well approximated, minimal swelling    Lochia: Lochia is appropriate for the duration of time since delivery.     ASSESSMENT:  PPD #1  Vacuum assisted vaginal delivery  Gestational HTN requiring management of severe range pressures with magnesium  S/P 24 hours of magnesium sulfate  Oral nifedipine 30 mg for HTN treatement  Doing well.  No immediate surgical complications identified.  No excessive bleeding  Pain well-controlled.  Hemoglobin   Date Value Ref Range Status   11/16/2024 10.9 (L) 11.7 - 15.7 g/dL Final   ]      PLAN:  Continue routine care  Ambulation encouraged  Reviewed postpartum warning signs and preeclampsia s/sx  Discussed will watch BPs closely and discontinue IV in a few hours  Will go home with BP cuff and recommend checking twice daily, can always consider increasing nifedipine as needed  Reviewed postpartum care plan including 5 day BP check, 2 week and 6 week " visits.  Anticipated discharge tomorrow pending normal BPS of magnesium    MARIA D Duarte CNM

## 2024-11-16 NOTE — LACTATION NOTE
"Lactation visit with Clifton, significant other Junito & baby boy Enma. Clifton reports feeding is going well so far. At time of visit, RN had asked Lactation to come assist as they were having difficulty getting Enma latched on deeply. We worked on positioning on left side in cross cradle hold. Noted his lower lip was tending to roll in, so removed him from breast to completely reposition. Checked suck with gloved finger and noted \"chompy\" initial suck, but able to extend tongue past lower lip with apparent normal mouth anatomy. Placed him back at breast and he was able to latch deeply in cross cradle hold when Clifton was holding her breast in a deep U hold, then continued to feed with a deeper latch which was more comfortable for Clifton. Encouraged to keep working on comfortable positioning and deep latches for each feeding.    Discussed cluster feeding, what it is and when to expect it, The Second Night, satiety cues, feeding cues, and reviewed Feeding Log for home use. Encouraged to review Breastfeeding section in Your Guide to Postpartum [facility] [place] [facility].    Reviewed milk supply and engorgement. Reviewed typical timeline of milk supply initiation and progression over first 3-5 days postpartum. Discussed comfort measures for engorgement, plugged duct treatment, and warning signs of breast infection. General questions answered regarding pumping, when it's helpful and necessary. Reviewed general recommendation to wait to start pumping until breastfeeding is well established unless there are feeding difficulties or engorgement not relieved by feeding baby or hand expression. Discussed introducing a bottle and recommendation to wait for bottle introduction for 3-4 weeks unless baby needs to supplement for medical reasons.    Feeding plan: Recommend unlimited, frequent breast feedings: At least 8 - 12 times every 24 hours. Avoid pacifiers and supplementation with formula unless medically indicated. Encouraged use of feeding log " and to record feedings, and void/stool patterns. Clifton has a breast pump for home use. Reviewed outpatient lactation resources. Clifton & Junito very appreciative of visit.    Deb Howell, RN, BSN, MNN, IBCLC

## 2024-11-16 NOTE — PROVIDER NOTIFICATION
11/16/24 0617   Provider Notification   Provider Name/Title SALLY Ortiz   Method of Notification Electronic Page   Request Evaluate-Remote   Notification Reason Other  (mag discontinue time)     Called SALLY to obtain a stop time for the magnesium infusion.  SALLY would like it discontinued 11/26/24 @8767.  Plan of care ongoing.

## 2024-11-16 NOTE — PLAN OF CARE
Goal Outcome Evaluation:      Plan of Care Reviewed With: patient    Overall Patient Progress: no changeOverall Patient Progress: no change    Vital signs, reflex/ clonus, LOC monitored per orders. Continuous pulse oximetry applied. Clonus and reflexes WDL. Pt denies preeclampsia symptoms of a visual disturbances, epigastric pain, but reports having an intermittent headache. Patient denies dizziness, lightheadedness, chest pain, SOB. Patient has +2 edema. Pt ambulating with stand-by assist. Patient voiding without difficulty. Vital signs stable. Postpartum assessment WDL. Pain controlled with tylenol and ibuprofen. Breastfeeding on cue with minimal staff assist. Patient and infant bonding well. Plan of care ongoing.

## 2024-11-17 PROCEDURE — 250N000013 HC RX MED GY IP 250 OP 250 PS 637: Performed by: ADVANCED PRACTICE MIDWIFE

## 2024-11-17 PROCEDURE — 99231 SBSQ HOSP IP/OBS SF/LOW 25: CPT | Mod: 24 | Performed by: NURSE PRACTITIONER

## 2024-11-17 PROCEDURE — 250N000013 HC RX MED GY IP 250 OP 250 PS 637: Performed by: NURSE PRACTITIONER

## 2024-11-17 PROCEDURE — 120N000012 HC R&B POSTPARTUM

## 2024-11-17 RX ORDER — NIFEDIPINE 30 MG/1
60 TABLET, EXTENDED RELEASE ORAL DAILY
Status: DISCONTINUED | OUTPATIENT
Start: 2024-11-17 | End: 2024-11-17

## 2024-11-17 RX ORDER — NIFEDIPINE 30 MG/1
30 TABLET, EXTENDED RELEASE ORAL DAILY
Status: COMPLETED | OUTPATIENT
Start: 2024-11-17 | End: 2024-11-17

## 2024-11-17 RX ORDER — NIFEDIPINE 30 MG/1
90 TABLET, EXTENDED RELEASE ORAL DAILY
Status: DISCONTINUED | OUTPATIENT
Start: 2024-11-18 | End: 2024-11-19 | Stop reason: HOSPADM

## 2024-11-17 RX ADMIN — NIFEDIPINE 30 MG: 30 TABLET, FILM COATED, EXTENDED RELEASE ORAL at 13:16

## 2024-11-17 RX ADMIN — ACETAMINOPHEN 650 MG: 325 TABLET, FILM COATED ORAL at 03:51

## 2024-11-17 RX ADMIN — IBUPROFEN 800 MG: 400 TABLET, FILM COATED ORAL at 03:51

## 2024-11-17 RX ADMIN — NIFEDIPINE 60 MG: 30 TABLET, FILM COATED, EXTENDED RELEASE ORAL at 05:01

## 2024-11-17 RX ADMIN — ACETAMINOPHEN 650 MG: 325 TABLET, FILM COATED ORAL at 13:20

## 2024-11-17 RX ADMIN — IBUPROFEN 800 MG: 400 TABLET, FILM COATED ORAL at 20:05

## 2024-11-17 RX ADMIN — ACETAMINOPHEN 650 MG: 325 TABLET, FILM COATED ORAL at 20:05

## 2024-11-17 RX ADMIN — IBUPROFEN 800 MG: 400 TABLET, FILM COATED ORAL at 13:20

## 2024-11-17 RX ADMIN — DOCUSATE SODIUM 100 MG: 100 CAPSULE, LIQUID FILLED ORAL at 09:26

## 2024-11-17 NOTE — PLAN OF CARE
VSS.135/8 & 125/69.  Ibuprofen & tylenol providing adequate pain control for perineal discomfort. Also using ice and tucks pads. Asim. Reg diet. Voiding.  1000 ml + urine.  Landen LE edema 2+.  Denies s/s pre-eclampsia. No headache tonight/evening.  Reflexes normal,no clonus.  Showered.Dep screen and Birth cert done.  Pt needs BP machine for home BP checks.    [place] breast feeding well with help to latch and position.  Goal Outcome Evaluation:      Plan of Care Reviewed With: patient, spouse    Overall Patient Progress: improvingOverall Patient Progress: improving

## 2024-11-17 NOTE — PROGRESS NOTES
"Matt [facility] Progress Note: Postpartum Day #2    November 17, 2024  8:34 AM    SUBJECTIVE:  Patient is stable and is tolerating acitivity well.  Denies any severe features.  Baby is rooming in at this time.   Complications since 2 hours post delivery: elevated BPs this AM  Pain is well controlled.  Patient is taking pain medications.  Breastfeeding status:initiated and latching difficulties   Elimination:  She is voiding without difficulty.  She has not had a bowel movement  Denies heavy bleeding and passing large clots.  Feels happy about birth experience, glad she did not need an induction!  Also happy she is off of Magnesium Sulfate.     OBJECTIVE:  BP (!) 151/87   Pulse 75   Temp 98.9  F (37.2  C) (Oral)   Resp 18   Ht 1.702 m (5' 7\")   Wt 95.6 kg (210 lb 11.2 oz)   LMP 01/31/2024 (Exact Date)   SpO2 100%   Breastfeeding Unknown   BMI 33.00 kg/m      Patient Vitals for the past 24 hrs:   BP Temp Temp src Pulse Resp Weight   11/17/24 0807 (!) 151/87 98.9  F (37.2  C) Oral 75 18 --   11/17/24 0700 -- -- -- -- -- 95.6 kg (210 lb 11.2 oz)   11/17/24 0430 (!) 156/89 -- -- 62 17 --   11/17/24 0405 (!) 145/92 -- -- 62 18 --   11/17/24 0030 136/83 98.4  F (36.9  C) Axillary 65 16 --   11/16/24 2100 125/69 98.9  F (37.2  C) Axillary 92 16 --   11/16/24 1543 135/80 98.3  F (36.8  C) Axillary 83 16 --   11/16/24 1300 125/65 -- -- 82 16 --      Constitutional: healthy, alert, no distress, and smiling    Breasts: Currently breastfeeding    Fundus: Uterine fundus is firm, non-tender and at the level of the umbilicus    Perineum: Perineum is  well approximated, minimal swelling    Lochia: Lochia is appropriate for the duration of time since delivery.     ASSESSMENT:  PPD #2  Vacuum assisted vaginal delivery  Doing well.  No excessive bleeding  Pain well-controlled.  GHTN w/BPs elevated > 140/90 0430 on 30mg PO Nifedipine  History of anxiety  Rh negative  Hemoglobin   Date Value Ref Range Status   11/16/2024 " 10.9 (L) 11.7 - 15.7 g/dL Final   ]    PLAN:  Continue routine care  Ambulation encouraged  Breast feeding strategies discussed  Lactation consultation  Nifedipine was increased to 60mg PO, was given at 0430.  Continue with VS q 4 hrs.  If BPs still elevated at 1200, will add additional 30mg PO nifidipine for total daily dose of 90mg.  Monitor for severe features and severe range pressures  Will plan for HELLP labs for any severe features and/or severe range pressures  Discussed importance of BP < 140/90 for 24hrs prior to discharge  Reviewed breastfeeding  Reviewed postpartum warning signs  Reviewed postpartum blues and postpartum depression warning signs  Anticipated discharge 11/18 or 11/19/2024        MARIA D Marmolejo CNM    30 minutes spent on the date of the encounter doing chart review, patient visit and documentation

## 2024-11-17 NOTE — PLAN OF CARE
Goal Outcome Evaluation:       Patient pain is well controlled.   She was on magnesium for high blood pressure and is measuring urine output and how much she drinks.  She denies symptoms of preeclampsia.  She still has edema but brachial reflexes are 2+ with no clonus.  Blood pressure is still elevated and midwife added another 30 mg of Procardia.  Patient will start on 90 mg of procardia daily tomorrow (11/18).  All questions answered.

## 2024-11-17 NOTE — PLAN OF CARE
Goal Outcome Evaluation:      Plan of Care Reviewed With: patient    Overall Patient Progress: decliningOverall Patient Progress: declining    Vital signs stable besides elevated blood pressures of 145/92 with a recheck of 156/89. +2 reflexes, no clonus and lung sounds clear and equal bilaterally. Denies headache, visual disturbances, RUQ pain and SOB. Patient is up independently, voiding adequate amounts without difficulty and keeping track of her I+Os. Pain is controlled with tylenol and ibuprofen. Encouraged to continue to ambulate as able and void frequently. Patient is bonding well with infant.

## 2024-11-17 NOTE — PROVIDER NOTIFICATION
11/17/24 0440   Provider Notification   Provider Name/Title Lavern Davey CNM   Method of Notification Electronic Page;Phone   Request Evaluate-Remote   Notification Reason Vital Signs Change  (elevated pressures)     CNM notified of elevated blood pressures of 145/92 and 156/89. Pt denies pre-e symptoms but c/o upset stomach due to having pain medications on an empty stomach. Patient specified that it is not RUQ pain. Provider ordered nifedipine dose to be increased to 60mg daily and to give the dose now opposed to 0800.

## 2024-11-17 NOTE — ANESTHESIA POSTPROCEDURE EVALUATION
Patient: Selina Cerrato    Procedure: * No procedures listed *       Anesthesia Type:  Epidural    Note:  Disposition: Admission   Postop Pain Control: Uneventful            Sign Out: Well controlled pain   PONV: No   Neuro/Psych: Uneventful            Sign Out: Acceptable/Baseline neuro status   Airway/Respiratory: Uneventful            Sign Out: Acceptable/Baseline resp. status   CV/Hemodynamics: Uneventful            Sign Out: Acceptable CV status; No obvious hypovolemia; No obvious fluid overload   Other NRE: NONE   DID A NON-ROUTINE EVENT OCCUR?     Event details/Postop Comments:  Patient doing well overall.  Pain controlled with non-opioid pain medication. Ambulating and voiding appropriately. Chart reviewed and assessed; no apparent complications identified.                     Last vitals:  Vitals:    11/16/24 0736 11/16/24 1300 11/16/24 1543   BP: 130/82 125/65 135/80   Pulse: 77 82 83   Resp: 16 16 16   Temp: 36.8  C (98.3  F)  36.8  C (98.3  F)   SpO2: 100%         Electronically Signed By: Isidro Foote MD  November 16, 2024  8:52 PM

## 2024-11-17 NOTE — PLAN OF CARE
Goal Outcome Evaluation:       Dr. Ortiz was notified with patient's BP of 144/83.  She wants to be notified if patient's BP >140/90 at 2000.

## 2024-11-18 PROBLEM — O13.9 GESTATIONAL HYPERTENSION: Status: ACTIVE | Noted: 2024-11-18

## 2024-11-18 LAB
ALBUMIN SERPL BCG-MCNC: 3.2 G/DL (ref 3.5–5.2)
ALP SERPL-CCNC: 124 U/L (ref 40–150)
ALT SERPL W P-5'-P-CCNC: 32 U/L (ref 0–50)
ANION GAP SERPL CALCULATED.3IONS-SCNC: 7 MMOL/L (ref 7–15)
AST SERPL W P-5'-P-CCNC: 50 U/L (ref 0–45)
BILIRUB SERPL-MCNC: <0.2 MG/DL
BUN SERPL-MCNC: 12 MG/DL (ref 6–20)
CALCIUM SERPL-MCNC: 8.8 MG/DL (ref 8.8–10.4)
CHLORIDE SERPL-SCNC: 104 MMOL/L (ref 98–107)
CREAT SERPL-MCNC: 0.78 MG/DL (ref 0.51–0.95)
EGFRCR SERPLBLD CKD-EPI 2021: >90 ML/MIN/1.73M2
ERYTHROCYTE [DISTWIDTH] IN BLOOD BY AUTOMATED COUNT: 12.6 % (ref 10–15)
GLUCOSE SERPL-MCNC: 78 MG/DL (ref 70–99)
HCO3 SERPL-SCNC: 26 MMOL/L (ref 22–29)
HCT VFR BLD AUTO: 29.8 % (ref 35–47)
HGB BLD-MCNC: 10.3 G/DL (ref 11.7–15.7)
MCH RBC QN AUTO: 30 PG (ref 26.5–33)
MCHC RBC AUTO-ENTMCNC: 34.6 G/DL (ref 31.5–36.5)
MCV RBC AUTO: 87 FL (ref 78–100)
PLATELET # BLD AUTO: 269 10E3/UL (ref 150–450)
POTASSIUM SERPL-SCNC: 4.4 MMOL/L (ref 3.4–5.3)
PROT SERPL-MCNC: 5.7 G/DL (ref 6.4–8.3)
RBC # BLD AUTO: 3.43 10E6/UL (ref 3.8–5.2)
SODIUM SERPL-SCNC: 137 MMOL/L (ref 135–145)
WBC # BLD AUTO: 10.5 10E3/UL (ref 4–11)

## 2024-11-18 PROCEDURE — 250N000013 HC RX MED GY IP 250 OP 250 PS 637

## 2024-11-18 PROCEDURE — 250N000013 HC RX MED GY IP 250 OP 250 PS 637: Performed by: ADVANCED PRACTICE MIDWIFE

## 2024-11-18 PROCEDURE — 82565 ASSAY OF CREATININE: CPT | Performed by: NURSE PRACTITIONER

## 2024-11-18 PROCEDURE — 99231 SBSQ HOSP IP/OBS SF/LOW 25: CPT | Mod: 24

## 2024-11-18 PROCEDURE — 250N000013 HC RX MED GY IP 250 OP 250 PS 637: Performed by: NURSE PRACTITIONER

## 2024-11-18 PROCEDURE — 85014 HEMATOCRIT: CPT | Performed by: NURSE PRACTITIONER

## 2024-11-18 PROCEDURE — 82310 ASSAY OF CALCIUM: CPT | Performed by: NURSE PRACTITIONER

## 2024-11-18 PROCEDURE — 120N000012 HC R&B POSTPARTUM

## 2024-11-18 PROCEDURE — 36415 COLL VENOUS BLD VENIPUNCTURE: CPT | Performed by: NURSE PRACTITIONER

## 2024-11-18 PROCEDURE — 82040 ASSAY OF SERUM ALBUMIN: CPT | Performed by: NURSE PRACTITIONER

## 2024-11-18 RX ORDER — DOCUSATE SODIUM 100 MG/1
100 CAPSULE, LIQUID FILLED ORAL DAILY
COMMUNITY
Start: 2024-11-19

## 2024-11-18 RX ORDER — NIFEDIPINE 90 MG/1
90 TABLET, FILM COATED, EXTENDED RELEASE ORAL DAILY
Qty: 60 TABLET | Refills: 0 | Status: SHIPPED | OUTPATIENT
Start: 2024-11-19 | End: 2025-01-18

## 2024-11-18 RX ORDER — NALOXONE HYDROCHLORIDE 0.4 MG/ML
0.2 INJECTION, SOLUTION INTRAMUSCULAR; INTRAVENOUS; SUBCUTANEOUS
Status: DISCONTINUED | OUTPATIENT
Start: 2024-11-18 | End: 2024-11-19 | Stop reason: HOSPADM

## 2024-11-18 RX ORDER — ACETAMINOPHEN 325 MG/1
650 TABLET ORAL EVERY 4 HOURS PRN
COMMUNITY
Start: 2024-11-18

## 2024-11-18 RX ORDER — NALOXONE HYDROCHLORIDE 0.4 MG/ML
0.4 INJECTION, SOLUTION INTRAMUSCULAR; INTRAVENOUS; SUBCUTANEOUS
Status: DISCONTINUED | OUTPATIENT
Start: 2024-11-18 | End: 2024-11-19 | Stop reason: HOSPADM

## 2024-11-18 RX ORDER — IBUPROFEN 800 MG/1
800 TABLET, FILM COATED ORAL EVERY 6 HOURS PRN
COMMUNITY
Start: 2024-11-18

## 2024-11-18 RX ADMIN — ACETAMINOPHEN 650 MG: 325 TABLET, FILM COATED ORAL at 22:18

## 2024-11-18 RX ADMIN — IBUPROFEN 800 MG: 400 TABLET, FILM COATED ORAL at 14:31

## 2024-11-18 RX ADMIN — LABETALOL HYDROCHLORIDE 300 MG: 200 TABLET, FILM COATED ORAL at 22:18

## 2024-11-18 RX ADMIN — IBUPROFEN 800 MG: 400 TABLET, FILM COATED ORAL at 08:30

## 2024-11-18 RX ADMIN — NIFEDIPINE 90 MG: 30 TABLET, FILM COATED, EXTENDED RELEASE ORAL at 06:14

## 2024-11-18 RX ADMIN — LABETALOL HYDROCHLORIDE 300 MG: 200 TABLET, FILM COATED ORAL at 13:34

## 2024-11-18 RX ADMIN — DOCUSATE SODIUM 100 MG: 100 CAPSULE, LIQUID FILLED ORAL at 08:31

## 2024-11-18 RX ADMIN — ACETAMINOPHEN 650 MG: 325 TABLET, FILM COATED ORAL at 14:31

## 2024-11-18 RX ADMIN — ACETAMINOPHEN 650 MG: 325 TABLET, FILM COATED ORAL at 02:26

## 2024-11-18 RX ADMIN — IBUPROFEN 800 MG: 400 TABLET, FILM COATED ORAL at 02:26

## 2024-11-18 RX ADMIN — ACETAMINOPHEN 650 MG: 325 TABLET, FILM COATED ORAL at 08:31

## 2024-11-18 RX ADMIN — IBUPROFEN 800 MG: 400 TABLET, FILM COATED ORAL at 22:18

## 2024-11-18 NOTE — PROGRESS NOTES
"Subjective:  Notified by R of elevated BP reading. Pt reports feeling \"flushed\" otherwise no other symptoms.    Objective:  BP (!) 152/88 (BP Location: Right arm, Patient Position: Semi-Patton's, Cuff Size: Adult Regular)   Pulse 98   Temp 98.7  F (37.1  C) (Oral)   Resp 18   Ht 1.702 m (5' 7\")   Wt 95.3 kg (210 lb)   LMP 01/31/2024 (Exact Date)   SpO2 100%   Breastfeeding Unknown   BMI 32.89 kg/m      Assessment:  PPD#3  Gestational hypertension, BP elevated  HELLP labs WNL aside from AST of 50 this AM    Plan:  Start labetalol 300 mg TID, monitor for s/sx   VS q4 hours  Discharge tomorrow pending BP control    Stephanie Bowers, APRN, CNM  "

## 2024-11-18 NOTE — PROVIDER NOTIFICATION
11/18/24 0021   Provider Notification   Provider Name/Title Ainsley Ortiz CNM   Method of Notification Electronic Page   Request Evaluate-Remote   Notification Reason Other  (BP status)     CNM updated regarding patient having two BP greater/equal to 140/90s.  Patient stated she 'felt like her pressure is high' but declined any Pre-e symptoms.  CNM would like to continue with Q4hr VS overnight.  Would like to be notified with pressures of 150/100s overnight.  No new/additional medications at this time.  CNM placed order for AM labs.  Plan of care ongoing.

## 2024-11-18 NOTE — PROVIDER NOTIFICATION
Provider called to update new order for labetalol 300mg TID. Hold if HR <60 and call if BPs >150/100.

## 2024-11-18 NOTE — PLAN OF CARE
Goal Outcome Evaluation:      Plan of Care Reviewed With: patient    Overall Patient Progress: improvingOverall Patient Progress: improving     Vital signs stable.  Patient had two elevated pressures overnight, MD aware (see note).  Patient denies pre-e symptoms of headache, spotted/blurred vision or epigastric pain. Postpartum assessment WDL. Pain controlled with tylenol and ibuprofen. Patient up ad harrison and voiding without difficulty. Breastfeeding on cue independently. Patient and infant bonding well. Plan of care ongoing.

## 2024-11-18 NOTE — PROGRESS NOTES
"Matt [facility] Progress Note: Postpartum Day #3    November 18, 2024  10:00 AM    SUBJECTIVE:  Patient is stable and is tolerating acitivity well  Baby is rooming in  Complications since 2 hours post delivery:   - hypertension; nifedipine dose increased to 90 mg/day, first full dose this morning at 0614.   - denies HA, vision change, RUQ pain  Pain is well controlled.  Patient is taking pain medications.  Breastfeeding status:initiated   Elimination:  She is voiding without difficulty.   Desired contraception Unsure  Denies heavy bleeding and passing large clots.  Feeling well this morning. Wondering if it is possible to go home today. Having difficulty getting sleep and getting comfortable in the hospital.     OBJECTIVE:  /84 (BP Location: Right arm, Patient Position: Semi-Patton's)   Pulse 60   Temp 98.1  F (36.7  C) (Oral)   Resp 16   Ht 1.702 m (5' 7\")   Wt 95.3 kg (210 lb)   LMP 01/31/2024 (Exact Date)   SpO2 100%   Breastfeeding Unknown   BMI 32.89 kg/m      Constitutional: healthy, alert, and no distress  Breasts: Currently breastfeeding  Fundus: Uterine fundus is firm, non-tender and at 1 cm below the level of the umbilicus  Perineum: Perineum is well approximated, minimal swelling  Lochia: Lochia is appropriate for the duration of time since delivery per R charting and pt description of bleeding as moderate period without clots.    ASSESSMENT:  PPD #3  Vacuum assisted vaginal delivery  No excessive bleeding  Pain well-controlled.  GHTN w/BPs elevated over night, last at 0015   History of anxiety  Rh negative  Hemoglobin   Date Value Ref Range Status   11/18/2024 10.3 (L) 11.7 - 15.7 g/dL Final       PLAN:  Continue routine care  Ambulation encouraged    Nifedipine was increased to 90mg PO, was given at 0614. If BP is elevated at 1200 may consider additional medication for management. Continue with VS q 4 hours. If no BP >140/90 by 1600 may discharge this evening with plan for clinic " BP check on Thursday or Friday.  HELLP labs this AM with mildly elevated AST, otherwise un-concerning for preeclampsia.    Reviewed breastfeeding  Reviewed postpartum warning signs  Reviewed postpartum blues and postpartum depression warning signs  Reviewed postpartum care plan including MyChart check-in within one week, 2 week and 6 week visits.    PP contraception not discussed today, previously in clinic pt states she used NFP, may return to this method.    Anticipated discharge this evening or tomorrow pending BP control.    MARIA D Banerjee CNM

## 2024-11-18 NOTE — PLAN OF CARE
Goal Outcome Evaluation:      Plan of Care Reviewed With: patient, spouse    Overall Patient Progress: improvingOverall Patient Progress: improving     Vital signs are stable.  Patient pain is well controlled with tylenol and ibuprofen.  She denies symptoms of preeclampsia. Reflexes are normal.  No clonus.  Blood pressure was 139/85.   Patient is on 90 mg of procardia daily.  Good intake and excellent output.  She records her intake and output.  All questions answered. Continue current plan of care.

## 2024-11-19 VITALS
TEMPERATURE: 97.8 F | OXYGEN SATURATION: 99 % | DIASTOLIC BLOOD PRESSURE: 91 MMHG | HEIGHT: 67 IN | SYSTOLIC BLOOD PRESSURE: 144 MMHG | WEIGHT: 209.88 LBS | BODY MASS INDEX: 32.94 KG/M2 | HEART RATE: 94 BPM | RESPIRATION RATE: 16 BRPM

## 2024-11-19 PROBLEM — O14.10 PREECLAMPSIA, SEVERE: Status: ACTIVE | Noted: 2024-11-19

## 2024-11-19 PROCEDURE — 99238 HOSP IP/OBS DSCHRG MGMT 30/<: CPT | Mod: 24 | Performed by: ADVANCED PRACTICE MIDWIFE

## 2024-11-19 PROCEDURE — 250N000013 HC RX MED GY IP 250 OP 250 PS 637: Performed by: ADVANCED PRACTICE MIDWIFE

## 2024-11-19 PROCEDURE — 250N000013 HC RX MED GY IP 250 OP 250 PS 637

## 2024-11-19 PROCEDURE — 250N000013 HC RX MED GY IP 250 OP 250 PS 637: Performed by: NURSE PRACTITIONER

## 2024-11-19 RX ORDER — LABETALOL 300 MG/1
300 TABLET, FILM COATED ORAL EVERY 8 HOURS
Qty: 60 TABLET | Refills: 1 | Status: SHIPPED | OUTPATIENT
Start: 2024-11-19 | End: 2024-11-21

## 2024-11-19 RX ORDER — MODIFIED LANOLIN
OINTMENT (GRAM) TOPICAL
COMMUNITY
Start: 2024-11-19

## 2024-11-19 RX ADMIN — ACETAMINOPHEN 650 MG: 325 TABLET, FILM COATED ORAL at 10:04

## 2024-11-19 RX ADMIN — NIFEDIPINE 90 MG: 30 TABLET, FILM COATED, EXTENDED RELEASE ORAL at 06:29

## 2024-11-19 RX ADMIN — IBUPROFEN 800 MG: 400 TABLET, FILM COATED ORAL at 10:04

## 2024-11-19 RX ADMIN — DOCUSATE SODIUM 100 MG: 100 CAPSULE, LIQUID FILLED ORAL at 10:05

## 2024-11-19 RX ADMIN — LABETALOL HYDROCHLORIDE 300 MG: 200 TABLET, FILM COATED ORAL at 10:35

## 2024-11-19 ASSESSMENT — ACTIVITIES OF DAILY LIVING (ADL)
ADLS_ACUITY_SCORE: 0

## 2024-11-19 NOTE — DISCHARGE INSTRUCTIONS
Warning Signs after Having a Baby    Keep this paper on your fridge or somewhere else where you can see it.    Call your provider if you have any of these symptoms up to 12 weeks after having your baby.    Thoughts of hurting yourself or your baby  Pain in your chest or trouble breathing  Severe headache not helped by pain medicine  Eyesight concerns (blurry vision, seeing spots or flashes of light, other changes to eyesight)  Fainting, shaking or other signs of a seizure    Call 9-1-1 if you feel that it is an emergency.     The symptoms below can happen to anyone after giving birth. They can be very serious. Call your provider if you have any of these warning signs.    My provider s phone number: _______________________    Losing too much blood (hemorrhage)    Call your provider if you soak through a pad in less than an hour or pass blood clots bigger than a golf ball. These may be signs that you are bleeding too much.    Blood clots in the legs or lungs    After you give birth, your body naturally clots its blood to help prevent blood loss. Sometimes this increased clotting can happen in other areas of the body, like the legs or lungs. This can block your blood flow and be very dangerous.     Call your provider if you:  Have a red, swollen spot on the back of your leg that is warm or painful when you touch it.   Are coughing up blood.     Infection    Call your provider if you have any of these symptoms:  Fever of 100.4 F (38 C) or higher.  Pain or redness around your stitches if you had an incision.   Any yellow, white, or green fluid coming from places where you had stitches or surgery.    Mood Problems (postpartum depression)    Many people feel sad or have mood changes after having a baby. But for some people, these mood swings are worse.     Call your provider right away if you feel so anxious or nervous that you can't care for yourself or your baby.    Preeclampsia (high blood pressure)    Even if you  didn't have high blood pressure when you were pregnant, you are at risk for the high blood pressure disease called preeclampsia. This risk can last up to 12 weeks after giving birth.     Call your provider if you have:   Pain on your right side under your rib cage  Sudden swelling in the hands and face    Remember: You know your body. If something doesn't feel right, get medical help.     For informational purposes only. Not to replace the advice of your health care provider. Copyright 2020 [employer] [facility]. All rights reserved. Clinically reviewed by Vinita Olvera, RNC-OB, MSN. [employer] 834160 - Rev 02/23.    Learning About Preeclampsia After Childbirth  What is preeclampsia?     Preeclampsia is high blood pressure and signs of organ damage, such as protein in the urine, usually after 20 weeks of pregnancy. If it's not treated, preeclampsia can harm you or your baby.  Severe preeclampsia can lead to dangerous seizures (eclampsia). When preeclampsia affects the liver, it can cause HELLP syndrome, a blood-clotting and bleeding problem. HELLP can come on quickly and can be dangerous. This is why your doctor checks you and your baby often.  Preeclampsia usually goes away after the baby is born. But symptoms may last or get worse after delivery. In rare cases, symptoms may not show up until days or even weeks after childbirth.  What are the symptoms?  Mild preeclampsia usually doesn't cause symptoms. But it may cause rapid weight gain and sudden swelling of the hands and face. Severe preeclampsia can cause symptoms such as a severe headache, vision problems, and trouble breathing. It also can cause belly pain. And you may urinate less than usual.  What can you expect after you've had preeclampsia?  In the hospital  After the baby and the placenta are delivered, preeclampsia usually starts to get better. Most people get better in the first few days after childbirth.  After having preeclampsia, you still have  a risk of seizures for a day or more after childbirth. (In very rare cases, seizures happen later on.) So your doctor may have you take magnesium sulfate for a day or more to prevent seizures. You may also take medicine to lower your blood pressure.  When you go home  Your blood pressure will most likely return to normal a few days after delivery. Your doctor will want to check your blood pressure sometime in the first week after you leave the hospital.  High blood pressure sometimes continues after childbirth. But it usually returns to normal levels with time.  Take and record your blood pressure at home if your doctor tells you to.  Ask your doctor to check your blood pressure monitor to be sure that it is accurate and that the cuff fits you. Also ask your doctor to watch you use it, to make sure that you are using it right.  Don't eat, use tobacco products, or use medicine known to raise blood pressure (such as some nasal decongestant sprays) before you take your blood pressure.  Avoid taking your blood pressure if you have just exercised or if you're nervous or upset. Rest at least 15 minutes before you take your blood pressure.  Take your medicines exactly as prescribed. Call your doctor if you think you are having a problem with your medicine.  If you smoke, try to quit. Talk to your doctor if you need help quitting.  Eat a variety of healthy foods. Include plenty of foods high in calcium, such as dairy products, almonds, and dark leafy greens.  Long-term health  After you've had preeclampsia, you have an increased risk of high blood pressure, heart disease, stroke, and kidney disease. This may be because the same things that cause preeclampsia also cause heart and kidney disease.  To protect your health, work with your doctor on living a heart-healthy lifestyle and getting the checkups you need. Your doctor may also want you to check your blood pressure at home.  Follow-up care is a key part of your treatment  "and safety. Be sure to make and go to all appointments, and call your doctor if you are having problems. It's also a good idea to know your test results and keep a list of the medicines you take.  When should you call for help?  Share this information with your partner or a friend. They can help you watch for warning signs.  Call 911  anytime you think you may need emergency care. For example, call if:    You passed out (lost consciousness).     You have a seizure.     You have trouble breathing.     You have chest pain.   Call your doctor now or seek immediate medical care if:    You have symptoms of preeclampsia, such as:  Sudden swelling of your face, hands, or feet.  New vision problems (such as dimness, blurring, or seeing spots).  A severe headache.     Your blood pressure is very high, such as 160/110 or higher.     Your blood pressure is higher than your doctor told you it should be, or it rises quickly.     You have new nausea or vomiting.     You have pain in your belly or pelvis.     You gain weight rapidly.   Where can you learn more?  Go to https://www.[employer].net/patiented  Enter Q718 in the search box to learn more about \"Learning About Preeclampsia After Childbirth.\"  Current as of: July 10, 2023  Content Version: 14.2 2024 [employer].   Care instructions adapted under license by your healthcare professional. If you have questions about a medical condition or this instruction, always ask your healthcare professional. [employer] disclaims any warranty or liability for your use of this information.  Postpartum Care at Home With Your Baby: Care Instructions  Overview     After childbirth (postpartum period), your body goes through many changes as you recover. In these weeks after delivery, try to take good care of yourself. Get rest whenever you can and accept help from others.  It may take 4 to 6 weeks to feel like yourself again, and possibly longer if you had a " " birth. You may feel sore or very tired as you recover. After delivery, you may continue to have contractions as the uterus returns to the size it was before your pregnancy. You will also have some vaginal bleeding. And you may have pain around the vagina as you heal. Several days after delivery you may also have pain and swelling in your breasts as they fill with milk. There are things you can do at home to help ease these discomforts.  After childbirth, it's common to feel emotional. You may feel irritable, cry easily, and feel happy one minute and sad the next. This is called the \"baby blues.\" Hormone changes are one cause of these emotional changes. These feelings usually get better within a couple of weeks. If they don't, talk to your doctor or midwife.  In the first couple of weeks after you give birth, your doctor or midwife may want to check in with you and make a plan for follow-up care. You will likely have a complete postpartum visit in the first 3 months after delivery. At that time, your doctor or midwife will check on your recovery and see how you're doing. But if you have questions or concerns before then, you can always call your doctor or midwife.  Follow-up care is a key part of your treatment and safety. Be sure to make and go to all appointments, and call your doctor if you are having problems. It's also a good idea to know your test results and keep a list of the medicines you take.  How can you care for yourself at home?  Taking care of your body  Use pads instead of tampons for bleeding. After birth, you will have bloody vaginal discharge. You may also pass some blood clots that shouldn't be bigger than an egg. Over the next 6 weeks or so, your bleeding should decrease a little every day and slowly change to a pinkish and then whitish discharge.  For cramps or mild pain, try an over-the-counter pain medicine, such as acetaminophen (Tylenol) or ibuprofen (Advil, Motrin). Read and follow " all instructions on the label.  To ease pain around the vagina or from hemorrhoids:  Put ice or a cold pack on the area for 10 to 20 minutes at a time. Put a thin cloth between the ice and your skin.  Try sitting in a few inches of warm water (sitz bath) when you can or after bowel movements.  Clean yourself with a gentle squeeze of warm water from a bottle instead of wiping with toilet paper.  Use witch hazel or hemorrhoid pads (such as Tucks).  Try using a cold compress for sore and swollen breasts. And wear a supportive bra that fits.  Ease constipation by drinking plenty of fluids and eating high-fiber foods. Ask your doctor or midwife about over-the-counter stool softeners.  Activity  Rest when you can.  Ask for help from family or friends when you need it.  If you can, have another adult in your home for at least 2 or 3 days after birth.  When you feel ready, try to get some exercise every day. For many people, walking is a good choice. Don't do any heavy exercise until your doctor or midwife says it's okay.  Ask your doctor or midwife when it is okay to have vaginal sex.  If you don't want to get pregnant, talk to your doctor or midwife about birth control options. You can get pregnant even before your period returns. Also, you can get pregnant while you are breastfeeding.  Talk to your doctor or midwife if you want to get pregnant again. They can talk to you about when it is safe.  Emotional health  It's normal to have some sadness, anxiety, and mood swings after delivery. It may help to talk with a trusted friend or family member. You can also call the Maternal Mental Health Hotline at 5-[facility] (1-208.506.7990) for support. If these mood changes last more than a couple of weeks, talk to your doctor or midwife.  When should you call for help?  Share this information with your partner, family, or a friend. They can help you watch for warning signs.  Call 911  anytime you think you may need emergency  care. For example, call if:    You feel you cannot stop from hurting yourself, your baby, or someone else.     You passed out (lost consciousness).     You have chest pain, are short of breath, or cough up blood.     You have a seizure.   Where to get help 24 hours a day, 7 days a week   If you or someone you know talks about suicide, self-harm, a mental health crisis, a substance use crisis, or any other kind of emotional distress, get help right away. You can:    Call the Suicide and Crisis Lifeline at 988.     Call 6-000-437-TALK (1-975.433.1192).     Text HOME to 063649 to access the Crisis Text Line.   Consider saving these numbers in your phone.  Go to [employer] for more information or to chat online.  Call your doctor or midwife now or seek immediate medical care if:    You have signs of hemorrhage (too much bleeding), such as:  Heavy vaginal bleeding. This means that you are soaking through one or more pads in an hour. Or you pass blood clots bigger than an egg.  Feeling dizzy or lightheaded, or you feel like you may faint.  Feeling so tired or weak that you cannot do your usual activities.  A fast or irregular heartbeat.  New or worse belly pain.     You have signs of infection, such as:  A fever.  Increased pain, swelling, warmth, or redness from an incision or wound.  Frequent or painful urination or blood in your urine.  Vaginal discharge that smells bad.  New or worse belly pain.     You have symptoms of a blood clot in your leg (called a deep vein thrombosis), such as:  Pain in the calf, back of the knee, thigh, or groin.  Swelling in the leg or groin.  A color change on the leg or groin. The skin may be reddish or purplish, depending on your usual skin color.     You have signs of preeclampsia, such as:  Sudden swelling of your face, hands, or feet.  New vision problems (such as dimness, blurring, or seeing spots).  A severe headache.     You have signs of heart failure, such as:  New or  "increased shortness of breath.  New or worse swelling in your legs, ankles, or feet.  Sudden weight gain, such as more than 2 to 3 pounds in a day or 5 pounds in a week.  Feeling so tired or weak that you cannot do your usual activities.     You had spinal or epidural pain relief and have:  New or worse back pain.  Increased pain, swelling, warmth, or redness at the injection site.  Tingling, weakness, or numbness in your legs or groin.   Watch closely for changes in your health, and be sure to contact your doctor or midwife if:    Your vaginal bleeding isn't decreasing.     You feel sad, anxious, or hopeless for more than a few days.     You are having problems with your breasts or breastfeeding.   Where can you learn more?  Go to https://www.[employer].net/patiented  Enter Z768 in the search box to learn more about \"Postpartum Care at Home With Your Baby: Care Instructions.\"  Current as of: July 10, 2023  Content Version: 14.2 2024 [facility] [employer].   Care instructions adapted under license by your healthcare professional. If you have questions about a medical condition or this instruction, always ask your healthcare professional. Healthwise, Incorporated disclaims any warranty or liability for your use of this information.      "

## 2024-11-19 NOTE — PLAN OF CARE
Vital signs stable. Started labetalol. Slight dull headache this afternoon. Declines additional signs and symptoms of preeclampsia. Postpartum assessment WDL. Pain controlled with tylenol and ibuprofen.  Working on breastfeeding  and pumping.  present and supportive. Patient and infant bonding well. Will continue with current plan of care.   Goal Outcome Evaluation:      Plan of Care Reviewed With: patient, spouse    Overall Patient Progress: improvingOverall Patient Progress: improving

## 2024-11-19 NOTE — PROVIDER NOTIFICATION
11/19/24 0552   Provider Notification   Provider Name/Title Kimi ASIMMARY KATE   Method of Notification Electronic Page   Request Evaluate-Remote   Notification Reason Status Update;Vital Signs Change     Patient's /79, HR 54. Patient placed on pulse ox and HR was between 54-68. Midwife notified, orders received to hold 0600 dose of Labetalol and re-evaluate at 0800. If BP elevated and HR >60, OK to give Labetalol dose at that time. Midwife OK with giving Procardia at this time.

## 2024-11-19 NOTE — DISCHARGE SUMMARY
[facility]    Discharge Summary  Obstetrics    Date of Admission:  2024  Date of Discharge:  2024  Discharging Provider: MARIA D Willis CNM  Date of Service (when I saw the patient): 24    Discharge Diagnoses   , Infant boy, VAVD on 11/15/24  Lactating mother, breastfeeding going well  Low hemoglobin due to acute blood loss during delivery  Pre-eclampsia with severe features    History of Present Illness   Selina Cerrato is a 34 year old female who presented with spontaneous onset of labor on 24.     Hospital Course   The patient's hospital course was complicated by pre-eclampsia requiring Magnesium Sulfate during labor and x24 hr post-partum. She was initiated on Nifedipine 90 mg and Labetalol 300 mg TID during this hospital stay and continued  these medications upon discharge home.    She also experience a low hemoglobin during the PP period due to acute blood loss during delivery. She was encouraged to continue with an oral iron regimen PP. On discharge, her pain was well controlled using Tylenol and Iubprofen. Vaginal bleeding is stable.  Voiding without difficulty.  Ambulating well and tolerating a normal diet.  No fever.  Breastfeeding is well, but also supplementing with formula. Infant is stable.  She was discharged on post-partum day #4.    Post-partum hemoglobin:   Hemoglobin   Date Value Ref Range Status   2024 10.3 (L) 11.7 - 15.7 g/dL Final       Discharge Disposition   Discharged to home   Condition at discharge: Stable    Primary Care Physician   Leslie Hutton    Consultations This Hospital Stay   ANESTHESIOLOGY IP CONSULT  LACTATION IP CONSULT    Discharge Orders      Postpartum Home Care Referral      Activity    Activity as tolerated     Reason for your hospital stay    Birth and postpartum care     Follow Up (2 and 6 weeks)    Follow up with provider in 2 weeks and 6 weeks for post-delivery checks     Activity    Activity  as tolerated     Reason for your hospital stay    Birth and postpartum care     Follow Up (2 and 6 weeks)    Follow up with provider in 2 weeks and 6 weeks for post-delivery checks     Severe preeclampsia follow up    Follow up in clinic or with home care within 3 days for blood pressure check     Breast pump - Manual/Electric    Breast Pump Documentation:  Manual/Electric Pump: To support adequate breast milk production and nutrition for infant.     I, the undersigned, certify that the above prescribed supplies are medically necessary for this patient and is both reasonable and necessary in reference to accepted standards of medical and necessary in reference to accepted standards of medical practice in the treatment of this patient's condition and is not prescribed as a convenience.     Home Blood Pressure Cuff Order for DME    DME Documentation:   Describe the reason for need to support medical necessity: hypertension.    I, the undersigned, certify that the above prescribed supplies are medically necessary for this patient and is both reasonable and necessary in reference to accepted standards of medical and necessary in reference to accepted standards of medical practice in the treatment of this patient's condition and is not prescribed as a convenience.     Diet    Resume previous diet     Discharge Medications   Current Discharge Medication List        START taking these medications    Details   acetaminophen (TYLENOL) 325 MG tablet Take 2 tablets (650 mg) by mouth every 4 hours as needed for mild pain or fever (greater than or equal to 38 C /100.4 F (oral) or 38.5 C/ 101.4 F (core).).    Associated Diagnoses: Vacuum-assisted vaginal delivery      benzocaine (AMERICAINE) 20 % external aerosol As directed for external vaginal discomfort.    Associated Diagnoses: Postpartum pain      docusate sodium (COLACE) 100 MG capsule Take 1 capsule (100 mg) by mouth daily.    Associated Diagnoses: Vacuum-assisted vaginal  delivery      ibuprofen (ADVIL/MOTRIN) 800 MG tablet Take 1 tablet (800 mg) by mouth every 6 hours as needed for other (cramping).    Associated Diagnoses: Vacuum-assisted vaginal delivery      labetalol (NORMODYNE) 300 MG tablet Take 1 tablet (300 mg) by mouth every 8 hours.  Qty: 60 tablet, Refills: 1    Associated Diagnoses: Pre-eclampsia in postpartum period      lanolin ointment Apply topically every hour as needed for other (sore nipples).    Associated Diagnoses: Lactating mother      NIFEdipine ER OSMOTIC (ADALAT CC) 90 MG 24 hr tablet Take 1 tablet (90 mg) by mouth daily.  Qty: 60 tablet, Refills: 0    Associated Diagnoses: Vacuum-assisted vaginal delivery; Gestational hypertension, third trimester           CONTINUE these medications which have NOT CHANGED    Details   Ashwagandha (ASHWAGANDHA 35) 120 MG CAPS       calcium carbonate (TUMS) 500 MG chewable tablet Take 1 chew tab by mouth 2 times daily      ferrous gluconate (FERGON) 324 (38 Fe) MG tablet Take 1 tablet (324 mg) by mouth daily (with breakfast).  Qty: 30 tablet, Refills: 1    Associated Diagnoses: Anemia affecting pregnancy in third trimester      MAGNESIUM CITRATE PO       Prenatal Vit-Fe Fumarate-FA (PNV PRENATAL PLUS MULTIVITAMIN) 27-1 MG TABS per tablet Take 1 tablet by mouth daily      vitamin B-Complex Take 1 tablet by mouth daily      Vitamin D3 (CHOLECALCIFEROL) 25 mcg (1000 units) tablet Take by mouth daily           Allergies   Allergies   Allergen Reactions    Cefaclor Rash     Pt states she had allergy as a infant (Rash)        Eri KILLIAN CNM

## 2024-11-19 NOTE — PLAN OF CARE
D: VSS, assessments WDL.   I: Pt. received complete discharge paperwork and home medications as filled by discharge pharmacy.  Pt. was given times of last dose for all discharge medications in writing on discharge medication sheets.  Discharge teaching included home medication, pain management, activity restrictions, postpartum cares, and signs and symptoms of infection.    A: Discharge outcomes on care plan met.  Mother states understanding and comfort with self care and follow up care.   P: Pt. Discharged. At 1245.  Pt. was accompanied by  EMI and left with personal belongings.  Pt. to follow up with OB provider per discharge instructions.  Pt. had no further questions at the time of discharge and no unmet needs were identified.Goal Outcome Evaluation:      Plan of Care Reviewed With: patient, spouse    Overall Patient Progress: improvingOverall Patient Progress: improving

## 2024-11-19 NOTE — PLAN OF CARE
Goal Outcome Evaluation:      Plan of Care Reviewed With: patient, spouse    Overall Patient Progress: improvingOverall Patient Progress: improving     BPs 130/83, 129/79, 119/72 overnight. Patient denies headache, vision changes, epigastric pain. Reflexes normal, no clonus noted. Lung sounds clear. Mild edema to lower extremities, encouraged patient to elevate when in bed. Fundus firm, lochia scant. Using Tylenol/Motrin for perineal soreness with good relief. Up ad harrison. Voiding without issues. Independent with  cares, spouse at bedside and supportive.

## 2024-11-19 NOTE — PROGRESS NOTES
"CN Postpartum Discharge Note    SIGNIFICANT PROBLEMS:  Patient Active Problem List   Diagnosis Code    History of indigestion Z87.19    Supervision of high risk pregnancy in third trimester O09.93    History of anxiety state Z91.89    Need for rhogam due to Rh negative mother Z29.13    Encounter for supervision of normal first pregnancy in third trimester Z34.03    Gestational hypertension, third trimester O13.3    Vacuum-assisted vaginal delivery Z37.9    Lactating mother Z39.1    Gestational hypertension O13.9    Pre-eclampsia in postpartum period O14.95    Preeclampsia, severe O14.10         SUBJECTIVE:  Patient is stable and is tolerating acitivity well  Baby is rooming in  Complications since 2 hours post delivery: Pre-eclampsia with severe features. Low hemoglobin due to acute blood loss during delivery  Pain is well controlled.  Patient is taking pain medications.  Breastfeeding status:initiated   Elimination:  She is voiding without difficulty.  She has had a bowel movement  Desired contraception Condoms and Natural family planning  Denies heavy bleeding and passing large clots.    INTERVAL HISTORY:  /81 (BP Location: Right arm, Patient Position: Semi-Patton's, Cuff Size: Adult Regular)   Pulse 54   Temp 97.8  F (36.6  C) (Oral)   Resp 16   Ht 1.702 m (5' 7\")   Wt 95.2 kg (209 lb 14.1 oz)   LMP 01/31/2024 (Exact Date)   SpO2 99%   Breastfeeding Yes   BMI 32.87 kg/m      Constitutional: healthy, alert, no distress, cooperative, and smiling    Breasts: Currently breastfeeding    Fundus: Uterine fundus is firm, non-tender and at 2 below the level of the umbilicus    Perineum: Perineum is intact and/or well approximated, minimal swelling    Lochia: Lochia is appropriate for the duration of time since delivery.     Postpartum hemoglobin   Hemoglobin   Date Value Ref Range Status   11/18/2024 10.3 (L) 11.7 - 15.7 g/dL Final     Blood type: O negative  Rubella status: Immune  History of depression: " No  History of anxiety: Yes    ASSESSMENT/PLAN:  Stable Post-partum day #4  Complications:anemic, plan for iron supplementation  Pre-eclampsia with severe features  Continue with 90 mg Nifedipine daily and Labetalol 300 mg TID.   Educated on taking BPs at home TID and parameters of when to call. Advised to hold Labetalol dose if pulse is 60 bpm or lower.  RN to review postpartum warning s/s reviewed, including bleeding/clots, fever, mastitis & thromboemboli, blood pressures, medications mgmt for BP   Exercise, diet and rest reviewed  Continue prenatal vitamins while breastfeeding  Birthcontrol planned:Condoms and NFP  Educated on postpartum blues and postpartum depression warnings signs/symptoms  OB home health referral order placed  Follow-up in 1, 2 & 6 weeks with CNMs at [facility] clinic  Plan d/c home today    Current Discharge Medication List        START taking these medications    Details   acetaminophen (TYLENOL) 325 MG tablet Take 2 tablets (650 mg) by mouth every 4 hours as needed for mild pain or fever (greater than or equal to 38 C /100.4 F (oral) or 38.5 C/ 101.4 F (core).).    Associated Diagnoses: Vacuum-assisted vaginal delivery      benzocaine (AMERICAINE) 20 % external aerosol As directed for external vaginal discomfort.    Associated Diagnoses: Postpartum pain      docusate sodium (COLACE) 100 MG capsule Take 1 capsule (100 mg) by mouth daily.    Associated Diagnoses: Vacuum-assisted vaginal delivery      ibuprofen (ADVIL/MOTRIN) 800 MG tablet Take 1 tablet (800 mg) by mouth every 6 hours as needed for other (cramping).    Associated Diagnoses: Vacuum-assisted vaginal delivery      labetalol (NORMODYNE) 300 MG tablet Take 1 tablet (300 mg) by mouth every 8 hours.  Qty: 60 tablet, Refills: 1    Associated Diagnoses: Pre-eclampsia in postpartum period      lanolin ointment Apply topically every hour as needed for other (sore nipples).    Associated Diagnoses: Lactating mother       NIFEdipine ER OSMOTIC (ADALAT CC) 90 MG 24 hr tablet Take 1 tablet (90 mg) by mouth daily.  Qty: 60 tablet, Refills: 0    Associated Diagnoses: Vacuum-assisted vaginal delivery; Gestational hypertension, third trimester           CONTINUE these medications which have NOT CHANGED    Details   Ashwagandha (ASHWAGANDHA 35) 120 MG CAPS       calcium carbonate (TUMS) 500 MG chewable tablet Take 1 chew tab by mouth 2 times daily      ferrous gluconate (FERGON) 324 (38 Fe) MG tablet Take 1 tablet (324 mg) by mouth daily (with breakfast).  Qty: 30 tablet, Refills: 1    Associated Diagnoses: Anemia affecting pregnancy in third trimester      MAGNESIUM CITRATE PO       Prenatal Vit-Fe Fumarate-FA (PNV PRENATAL PLUS MULTIVITAMIN) 27-1 MG TABS per tablet Take 1 tablet by mouth daily      vitamin B-Complex Take 1 tablet by mouth daily      Vitamin D3 (CHOLECALCIFEROL) 25 mcg (1000 units) tablet Take by mouth daily           MARIA D Willis CNM

## 2024-11-19 NOTE — LACTATION NOTE
Follow up Lactation visit with Clifton, significant other Junito & baby boy Enma, rooming-in with mom as baby was discharged. Getting ready for discharge. Clifton reports feeding is going better. She is breastfeeding, then they're bottle feeding with formula after breastfeeding sessions. She's been pumping as well, at times, and at last pump session she got a good amount of colostrum. Let Clifton know she's doing a great job. Encouraged continuing to work on breastfeeding on both sides, then encouraged trying to pump after each feeding until Enma no longer needs supplementation.    Discussed feeding cues, and reviewed Feeding Log for home use. Encouraged to review Breastfeeding section in Your Guide to Postpartum &  Care. Reviewed milk supply and engorgement. Reviewed typical timeline of milk supply initiation and progression over first 3-5 days postpartum. Discussed comfort measures for engorgement, plugged duct treatment, and warning signs of breast infection. Measured nipples for pumping and measured at 15mm on both sides. Recommend purchasing smaller flanges for her home Spectra pump, and since she's been using the 24mm flanges so far with Medela, recommend sizing down to the 21mm. Reviewed follow up Lactation resources available as needed. Clifton & Junito appreciative of visit and Lactation support.    Deb Howell, RN, BSN, MNN, IBCLC

## 2024-11-21 ENCOUNTER — ALLIED HEALTH/NURSE VISIT (OUTPATIENT)
Dept: OBGYN | Facility: CLINIC | Age: 34
End: 2024-11-21
Payer: COMMERCIAL

## 2024-11-21 VITALS — HEART RATE: 80 BPM | DIASTOLIC BLOOD PRESSURE: 75 MMHG | SYSTOLIC BLOOD PRESSURE: 120 MMHG

## 2024-11-21 DIAGNOSIS — O13.3 GESTATIONAL HYPERTENSION, THIRD TRIMESTER: Primary | ICD-10-CM

## 2024-11-21 RX ORDER — LABETALOL 200 MG/1
200 TABLET, FILM COATED ORAL EVERY 8 HOURS
Qty: 90 TABLET | Refills: 0 | Status: SHIPPED | OUTPATIENT
Start: 2024-11-21

## 2024-11-21 NOTE — PROGRESS NOTES
BLOOD PRESSURE CHECK    Subjective:    Selina is being seen in clinic for a blood pressure check due to Gestational hypertension . Patient is postpartum (delivery date: 11/15/2024).    Patient reports taking the following antihypertensive medications: labetalol  and nifedipine     Patient denies no hypertension symptoms.       Objective:    /75      Plan:    Blood pressure results are in the NORMAL (-139 and DBP 60-89) range of the ACOG Hypertensive Disorders of Pregnancy thresholds for pregnant and recently pregnant patients. Consulted with Michelle Zheng CNM regarding home BP readings. Medication adjustments made, 2 week appt already scheduled. Clifton is well aware of BP parameters and s/s that would warrant emergent evaluation.  Yaneth Mathias RN on 11/21/2024 at 2:45 PM        Reviewed with patient to call triage number if symptoms (chest pain, shortness of breath, visual changes, severe headache, lower extremity edema or right upper quadrant pain) occur. Visit encounter routed to provider.    Yaneth Mathias RN on 11/21/2024 at 2:40 PM

## 2024-12-09 ENCOUNTER — MYC MEDICAL ADVICE (OUTPATIENT)
Dept: MIDWIFE SERVICES | Facility: CLINIC | Age: 34
End: 2024-12-09
Payer: COMMERCIAL

## 2024-12-09 DIAGNOSIS — O99.013 ANEMIA AFFECTING PREGNANCY IN THIRD TRIMESTER: ICD-10-CM

## 2024-12-09 RX ORDER — FERROUS GLUCONATE 324(38)MG
324 TABLET ORAL
Qty: 30 TABLET | Refills: 1 | Status: SHIPPED | OUTPATIENT
Start: 2024-12-09

## 2024-12-09 NOTE — TELEPHONE ENCOUNTER
Delivered 11/15/24  Pt wondering if she should refill iron supplement at this time    11/18:  Hemoglobin  11.7 - 15.7 g/dL 10.3       PP appt 12/27 Angel  Routing pt mychart message to provider to advise.  Assuming OK to refill?  Toan Phillips RN on 12/9/2024 at 12:36 PM   [facility]

## 2024-12-27 PROBLEM — O09.93 SUPERVISION OF HIGH RISK PREGNANCY IN THIRD TRIMESTER: Status: RESOLVED | Noted: 2024-03-13 | Resolved: 2024-12-27

## 2024-12-27 PROBLEM — O13.3 GESTATIONAL HYPERTENSION, THIRD TRIMESTER: Status: RESOLVED | Noted: 2024-11-14 | Resolved: 2024-12-27

## 2024-12-27 PROBLEM — Z37.9 VACUUM-ASSISTED VAGINAL DELIVERY: Status: RESOLVED | Noted: 2024-11-15 | Resolved: 2024-12-27

## 2024-12-27 PROBLEM — Z34.03 ENCOUNTER FOR SUPERVISION OF NORMAL FIRST PREGNANCY IN THIRD TRIMESTER: Status: RESOLVED | Noted: 2024-11-14 | Resolved: 2024-12-27

## 2024-12-27 PROBLEM — O13.9 GESTATIONAL HYPERTENSION: Status: RESOLVED | Noted: 2024-11-18 | Resolved: 2024-12-27

## 2024-12-27 PROBLEM — O14.10 PREECLAMPSIA, SEVERE: Status: RESOLVED | Noted: 2024-11-19 | Resolved: 2024-12-27

## 2024-12-27 PROBLEM — N39.3 STRESS INCONTINENCE IN FEMALE: Status: ACTIVE | Noted: 2024-12-27

## 2025-01-28 ENCOUNTER — OFFICE VISIT (OUTPATIENT)
Dept: FAMILY MEDICINE | Facility: CLINIC | Age: 35
End: 2025-01-28
Payer: COMMERCIAL

## 2025-01-28 VITALS
WEIGHT: 204 LBS | HEIGHT: 67 IN | HEART RATE: 68 BPM | DIASTOLIC BLOOD PRESSURE: 81 MMHG | TEMPERATURE: 98.4 F | RESPIRATION RATE: 20 BRPM | SYSTOLIC BLOOD PRESSURE: 131 MMHG | OXYGEN SATURATION: 99 % | BODY MASS INDEX: 32.02 KG/M2

## 2025-01-28 DIAGNOSIS — Z00.00 ROUTINE GENERAL MEDICAL EXAMINATION AT A HEALTH CARE FACILITY: Primary | ICD-10-CM

## 2025-01-28 LAB
BASOPHILS # BLD AUTO: 0 10E3/UL (ref 0–0.2)
BASOPHILS NFR BLD AUTO: 1 %
EOSINOPHIL # BLD AUTO: 0.2 10E3/UL (ref 0–0.7)
EOSINOPHIL NFR BLD AUTO: 3 %
ERYTHROCYTE [DISTWIDTH] IN BLOOD BY AUTOMATED COUNT: 11.9 % (ref 10–15)
EST. AVERAGE GLUCOSE BLD GHB EST-MCNC: 94 MG/DL
HBA1C MFR BLD: 4.9 % (ref 0–5.6)
HCT VFR BLD AUTO: 35.4 % (ref 35–47)
HGB BLD-MCNC: 11.8 G/DL (ref 11.7–15.7)
IMM GRANULOCYTES # BLD: 0 10E3/UL
IMM GRANULOCYTES NFR BLD: 0 %
LYMPHOCYTES # BLD AUTO: 2.9 10E3/UL (ref 0.8–5.3)
LYMPHOCYTES NFR BLD AUTO: 36 %
MCH RBC QN AUTO: 27.8 PG (ref 26.5–33)
MCHC RBC AUTO-ENTMCNC: 33.3 G/DL (ref 31.5–36.5)
MCV RBC AUTO: 84 FL (ref 78–100)
MONOCYTES # BLD AUTO: 0.8 10E3/UL (ref 0–1.3)
MONOCYTES NFR BLD AUTO: 10 %
NEUTROPHILS # BLD AUTO: 4.1 10E3/UL (ref 1.6–8.3)
NEUTROPHILS NFR BLD AUTO: 51 %
PLATELET # BLD AUTO: 301 10E3/UL (ref 150–450)
RBC # BLD AUTO: 4.24 10E6/UL (ref 3.8–5.2)
WBC # BLD AUTO: 8.1 10E3/UL (ref 4–11)

## 2025-01-28 PROCEDURE — 36415 COLL VENOUS BLD VENIPUNCTURE: CPT | Performed by: FAMILY MEDICINE

## 2025-01-28 PROCEDURE — 83036 HEMOGLOBIN GLYCOSYLATED A1C: CPT | Performed by: FAMILY MEDICINE

## 2025-01-28 PROCEDURE — 85025 COMPLETE CBC W/AUTO DIFF WBC: CPT | Performed by: FAMILY MEDICINE

## 2025-01-28 PROCEDURE — 80053 COMPREHEN METABOLIC PANEL: CPT | Performed by: FAMILY MEDICINE

## 2025-01-28 PROCEDURE — 99395 PREV VISIT EST AGE 18-39: CPT | Performed by: FAMILY MEDICINE

## 2025-01-28 PROCEDURE — 80061 LIPID PANEL: CPT | Performed by: FAMILY MEDICINE

## 2025-01-28 ASSESSMENT — PAIN SCALES - GENERAL: PAINLEVEL_OUTOF10: NO PAIN (0)

## 2025-01-28 NOTE — PROGRESS NOTES
"  Assessment & Plan     Routine general medical examination at a health care facility  Preventive care reviewed and updated.  Health maintenance screening and immunizations reviewed with the patient.    We discussed healthy lifestyle, nutrition, cardiovascular risk reduction.  History of colonic polyps, she gets colonoscopy every 5 years.  She is due for colonoscopy next year  She is going to schedule Pap with midwife  History of anxiety on medication, may consider  - Screen Labs ordered   - Continue great active lifestyle  - Follow up yearly for the annual physical and preventive care.    - Hemoglobin A1c; Future  - Lipid panel reflex to direct LDL Non-fasting; Future  - CBC with Platelets & Differential; Future  - Comprehensive metabolic panel; Future    BMI  Estimated body mass index is 31.95 kg/m  as calculated from the following:    Height as of this encounter: 1.702 m (5' 7\").    Weight as of this encounter: 92.5 kg (204 lb).   Weight management plan: Discussed healthy diet and exercise guidelines      Regular exercise    Subjective   Clifton is a 34 year old, presenting for the following health issues:  Consult (Establish care)        1/28/2025     5:09 PM   Additional Questions   Roomed by Gaby   Accompanied by  and son     History of Present Illness       Reason for visit:  Inquiry for primary care for me and my family   She is taking medications regularly.     Preventive -     Immunization History   Administered Date(s) Administered    COVID-19 Monovalent 18+ (Moderna) 02/11/2021    DTAP (<7y) 1992, 02/16/1993, 04/06/1993, 04/29/1994, 06/03/1998    HIB (PRP-T) 1992, 02/16/1993, 04/06/1993, 03/15/1994    HPV Quadrivalent 01/02/2014, 02/10/2014, 06/24/2014    Hepatitis B, Peds 11/01/2004, 11/30/2004, 05/09/2005    Influenza (IIV3) PF 01/02/2014, 10/29/2015    Influenza Intranasal Vaccine 11/07/2006    Influenza Vaccine >6 months,quad, PF 01/02/2015, 11/10/2015, 10/26/2016, 10/25/2017, " "10/30/2019, 10/12/2020, 10/14/2021, 10/03/2022    Influenza Vaccine, 6+MO IM (QUADRIVALENT W/PRESERVATIVES) 10/29/2015    Influenza, Split Virus, Trivalent, Pf (Fluzone\Fluarix) 09/25/2024    Influenza,INJ,MDCK,PF,Quad >6mo(Flucelvax) 10/11/2023    MMR 03/15/1994, 11/01/2004    Meningococcal ACWY (Menactra ) 01/02/2014    Meningococcal ACWY (Menveo ) 01/02/2014    OPV, unspecified 1992, 02/16/1993, 04/29/1994, 06/03/1998    RSV Vaccine (Abrysvo) 10/09/2024    TD,PF 7+ (Tenivac) 11/01/2004    TDAP (Adacel,Boostrix) 01/02/2014, 08/14/2024    Td (Adult), Adsorbed 11/01/2004    Varicella 06/03/1998         -Mammogram: start at age 40     -Contraception: condoms     -PAP: will schedule with midwife     No results found for: \"PAP\"      - Colon CA screen: Colonoscopy, age 45-75 every 10 years or FIT every year or Cologuard every 3 years due for colonoscopy 05/2026    -lipids screen: ordered     Diabetes screen: ordered       Rare alcohol , no smoking or drug use       Past Medical History:   Diagnosis Date    Anxiety     History of colonic polyps     dx age 12, multiple removed, benign    History of colonic polyps      Past Surgical History:   Procedure Laterality Date    TONSILLECTOMY & ADENOIDECTOMY      childhood     Review of Systems  Constitutional, HEENT, cardiovascular, pulmonary, gi and gu systems are negative, except as otherwise noted.      Objective    /81   Pulse 68   Temp 98.4  F (36.9  C) (Oral)   Resp 20   Ht 1.702 m (5' 7\")   Wt 92.5 kg (204 lb)   LMP 01/06/2025 (Exact Date)   SpO2 99%   Breastfeeding Yes   BMI 31.95 kg/m    Body mass index is 31.95 kg/m .  Physical Exam   GENERAL: alert and no distress  NECK: no adenopathy, no asymmetry, masses, or scars  RESP: lungs clear to auscultation - no rales, rhonchi or wheezes  CV: regular rate and rhythm, normal S1 S2, no S3 or S4, no murmur, click or rub, no peripheral edema  ABDOMEN: soft, nontender, no hepatosplenomegaly, no masses and " bowel sounds normal  MS: no gross musculoskeletal defects noted, no edema            Signed Electronically by: Lilia Cruz MD

## 2025-01-28 NOTE — PATIENT INSTRUCTIONS
Patient Education   Preventive Care Advice   This is general advice given by our system to help you stay healthy. However, your care team may have specific advice just for you. Please talk to your care team about your preventive care needs.  Nutrition  Eat 5 or more servings of fruits and vegetables each day.  Try wheat bread, brown rice and whole grain pasta (instead of white bread, rice, and pasta).  Get enough calcium and vitamin D. Check the label on foods and aim for 100% of the RDA (recommended daily allowance).  Lifestyle  Exercise at least 150 minutes each week  (30 minutes a day, 5 days a week).  Do muscle strengthening activities 2 days a week. These help control your weight and prevent disease.  No smoking.  Wear sunscreen to prevent skin cancer.  Have a dental exam and cleaning every 6 months.  Yearly exams  See your health care team every year to talk about:  Any changes in your health.  Any medicines your care team has prescribed.  Preventive care, family planning, and ways to prevent chronic diseases.  Shots (vaccines)   HPV shots (up to age 26), if you've never had them before.  Hepatitis B shots (up to age 59), if you've never had them before.  COVID-19 shot: Get this shot when it's due.  Flu shot: Get a flu shot every year.  Tetanus shot: Get a tetanus shot every 10 years.  Pneumococcal, hepatitis A, and RSV shots: Ask your care team if you need these based on your risk.  Shingles shot (for age 50 and up)  General health tests  Diabetes screening:  Starting at age 35, Get screened for diabetes at least every 3 years.  If you are younger than age 35, ask your care team if you should be screened for diabetes.  Cholesterol test: At age 39, start having a cholesterol test every 5 years, or more often if advised.  Bone density scan (DEXA): At age 50, ask your care team if you should have this scan for osteoporosis (brittle bones).  Hepatitis C: Get tested at least once in your life.  STIs (sexually  transmitted infections)  Before age 24: Ask your care team if you should be screened for STIs.  After age 24: Get screened for STIs if you're at risk. You are at risk for STIs (including HIV) if:  You are sexually active with more than one person.  You don't use condoms every time.  You or a partner was diagnosed with a sexually transmitted infection.  If you are at risk for HIV, ask about PrEP medicine to prevent HIV.  Get tested for HIV at least once in your life, whether you are at risk for HIV or not.  Cancer screening tests  Cervical cancer screening: If you have a cervix, begin getting regular cervical cancer screening tests starting at age 21.  Breast cancer scan (mammogram): If you've ever had breasts, begin having regular mammograms starting at age 40. This is a scan to check for breast cancer.  Colon cancer screening: It is important to start screening for colon cancer at age 45.  Have a colonoscopy test every 10 years (or more often if you're at risk) Or, ask your provider about stool tests like a FIT test every year or Cologuard test every 3 years.  To learn more about your testing options, visit:   .  For help making a decision, visit:   https://bit.ly/wr63244.  Prostate cancer screening test: If you have a prostate, ask your care team if a prostate cancer screening test (PSA) at age 55 is right for you.  Lung cancer screening: If you are a current or former smoker ages 50 to 80, ask your care team if ongoing lung cancer screenings are right for you.  For informational purposes only. Not to replace the advice of your health care provider. Copyright   2023 [employer]. All rights reserved. Clinically reviewed by the [facility] Transitions Program. [employer] [id number] - REV 01/24.

## 2025-01-29 LAB
ALBUMIN SERPL BCG-MCNC: 4.4 G/DL (ref 3.5–5.2)
ALP SERPL-CCNC: 81 U/L (ref 40–150)
ALT SERPL W P-5'-P-CCNC: 15 U/L (ref 0–50)
ANION GAP SERPL CALCULATED.3IONS-SCNC: 11 MMOL/L (ref 7–15)
AST SERPL W P-5'-P-CCNC: 26 U/L (ref 0–45)
BILIRUB SERPL-MCNC: 0.2 MG/DL
BUN SERPL-MCNC: 18.9 MG/DL (ref 6–20)
CALCIUM SERPL-MCNC: 10.4 MG/DL (ref 8.8–10.4)
CHLORIDE SERPL-SCNC: 102 MMOL/L (ref 98–107)
CHOLEST SERPL-MCNC: 269 MG/DL
CREAT SERPL-MCNC: 0.93 MG/DL (ref 0.51–0.95)
EGFRCR SERPLBLD CKD-EPI 2021: 83 ML/MIN/1.73M2
FASTING STATUS PATIENT QL REPORTED: NO
FASTING STATUS PATIENT QL REPORTED: NO
GLUCOSE SERPL-MCNC: 84 MG/DL (ref 70–99)
HCO3 SERPL-SCNC: 25 MMOL/L (ref 22–29)
HDLC SERPL-MCNC: 89 MG/DL
LDLC SERPL CALC-MCNC: 152 MG/DL
NONHDLC SERPL-MCNC: 180 MG/DL
POTASSIUM SERPL-SCNC: 4.3 MMOL/L (ref 3.4–5.3)
PROT SERPL-MCNC: 7 G/DL (ref 6.4–8.3)
SODIUM SERPL-SCNC: 138 MMOL/L (ref 135–145)
TRIGL SERPL-MCNC: 141 MG/DL

## 2025-02-01 ENCOUNTER — NURSE TRIAGE (OUTPATIENT)
Dept: NURSING | Facility: CLINIC | Age: 35
End: 2025-02-01
Payer: COMMERCIAL

## 2025-02-02 NOTE — TELEPHONE ENCOUNTER
"Nurse Triage SBAR    Is this a 2nd Level Triage? YES, LICENSED PRACTITIONER REVIEW IS REQUIRED    Situation: Vaginal bleeding, Post partum    Background:   -Heavy menstrual bleeding, saturating pads, tampon really full  -my son got sick on Thurs.night, just vomiting    Prepartum:  Midwives: [facility]om[facility] clinic, in [place]: Midwives,     Assessment:   -Large amount of vaginal bleeding, like when postpartum, more this am  -Postpartum: 2 1/2 months post baby, have had minor bleeding  -Waves of dizziness, feels very weak  -Felt nauseous,   -Period cramps mild  -Diarrhea x2 today  -Stomach hurts \"got upper GI pain\"  - is concerned  -Feel warm, will take temperature now: 97.8(oral)  -Within last 2 hours: has not saturated tampon  -Weak      Protocol Recommended Disposition:   See HCP Within 4 Hours (Or PCP Triage)    Recommendation:   -Advised pt. To see HCP within 4 hours, Pt. Will go to [place] [facility],  will drive her        Reason for Disposition   Pale skin (pallor) of new-onset or worsening    Additional Information   Negative: Shock suspected (e.g., cold/pale/clammy skin, too weak to stand, low BP, rapid pulse)   Negative: Difficult to awaken or acting confused (e.g., disoriented, slurred speech)   Negative: Passed out (i.e., lost consciousness, collapsed and was not responding)   Negative: Sounds like a life-threatening emergency to the triager   Negative: SEVERE dizziness (e.g., unable to stand, requires support to walk, feels like passing out now)   Negative: [1] SEVERE abdominal pain AND [2] present > 1 hour   Negative: Fever 100.4 F (38.0 C) or higher   Negative: SEVERE vaginal bleeding (e.g., soaking 2 pads or tampons per hour and present 2 or more hours; 1 menstrual cup every 2 hours)   Negative: Patient sounds very sick or weak to the triager   Negative: MODERATE vaginal bleeding (e.g., soaking 1 pad or tampon per hour and present > 6 hours; 1 menstrual cup every 6 hours)   Negative: [1] Constant " abdominal pain AND [2] present > 2 hours    Protocols used: Postpartum - Vaginal Bleeding and Lochia-A-AH  Vivien Ryder RN, Triage Nurse Advisor, 2/1/2025 8:51 PM

## 2025-03-18 ENCOUNTER — MEDICAL CORRESPONDENCE (OUTPATIENT)
Dept: HEALTH INFORMATION MANAGEMENT | Facility: CLINIC | Age: 35
End: 2025-03-18
Payer: COMMERCIAL

## 2025-04-21 ENCOUNTER — OFFICE VISIT (OUTPATIENT)
Dept: OBGYN | Facility: CLINIC | Age: 35
End: 2025-04-21
Payer: COMMERCIAL

## 2025-04-21 VITALS
HEIGHT: 67 IN | BODY MASS INDEX: 33.27 KG/M2 | WEIGHT: 212 LBS | SYSTOLIC BLOOD PRESSURE: 128 MMHG | DIASTOLIC BLOOD PRESSURE: 76 MMHG

## 2025-04-21 DIAGNOSIS — Z01.419 ENCOUNTER FOR BREAST AND PELVIC EXAMINATION: Primary | ICD-10-CM

## 2025-04-21 DIAGNOSIS — Z12.4 SCREENING FOR CERVICAL CANCER: ICD-10-CM

## 2025-04-21 PROBLEM — Z29.13 NEED FOR RHOGAM DUE TO RH NEGATIVE MOTHER: Status: RESOLVED | Noted: 2024-08-14 | Resolved: 2025-04-21

## 2025-04-21 PROCEDURE — 3078F DIAST BP <80 MM HG: CPT | Performed by: NURSE PRACTITIONER

## 2025-04-21 PROCEDURE — 87624 HPV HI-RISK TYP POOLED RSLT: CPT | Performed by: NURSE PRACTITIONER

## 2025-04-21 PROCEDURE — 99459 PELVIC EXAMINATION: CPT | Performed by: NURSE PRACTITIONER

## 2025-04-21 PROCEDURE — 3074F SYST BP LT 130 MM HG: CPT | Performed by: NURSE PRACTITIONER

## 2025-04-21 PROCEDURE — 99395 PREV VISIT EST AGE 18-39: CPT | Performed by: NURSE PRACTITIONER

## 2025-04-21 NOTE — PROGRESS NOTES
Selina is a 34 year old  female who presents for annual exam.     Besides routine health maintenance, she has no other health concerns today .    HPI:  The patient's PCP is  Lilia Cruz MD.      New patient to me here today for her annual GYN exam.  She would like to follow-up on an ultrasound that she had last month and she has questions about breast-feeding.    She delivered a healthy baby boy in 2024.  She has been breast-feeding and supplementing with formula.  She resumed menstrual activity in January and was alarmed by the intensity of her cycle in both January and February.  Her March cycle was more characteristic of her normal activity.    She is sexually active and  denies any pain or bleeding with intercourse.  She and her  are using condoms currently.      GYNECOLOGIC HISTORY:    Patient's last menstrual period was 2025 (approximate).    Regular menses? yes  Menses every 28-30 days.  Length of menses: 4 days    Her current contraception method is: condoms.  She  reports that she has never smoked. She has never been exposed to tobacco smoke. She has never used smokeless tobacco.    Patient is sexually active.  STD testing offered?  Declined  Last PHQ-9 score on record =       3/12/2024     8:15 PM   PHQ-9 SCORE   PHQ-9 Total Score MyChart 3 (Minimal depression)   PHQ-9 Total Score 3     Last GAD7 score on record =       2024     8:04 AM   POLA-7 SCORE   Total Score 1         HEALTH MAINTENANCE:  Cholesterol: (  Cholesterol   Date Value Ref Range Status   2025 269 (H) <200 mg/dL Final   2022 253 (H) <200 mg/dL Final      Pap:   Lab Results   Component Value Date    GYNINTERP  2022     Negative for Intraepithelial Lesion or Malignancy (NILM)     Health maintenance updated:  yes    Care Gaps    Overdue          Never done Pneumococcal Vaccine: Pediatrics (0 to 5 Years) and At-Risk Patients (6 to 49 Years) (1 of 2 - PCV)     Never done ZOSTER  IMMUNIZATION (1 of 2)     MAR 11  2021 COVID-19 Vaccine (2 - Moderna risk series)  Last completed:  PAP (Every 3 Years)  Last completed: 2022         Upcoming          2026 YEARLY PREVENTIVE VISIT (Yearly)  Last completed: 2025     MAY 1  2026 COLORECTAL CANCER SCREENING (COLONOSCOPY - Required) (Every 5 Years)  Last completed: May 12, 2021     FEB 14  2027 ADVANCE CARE PLANNING (Every 5 Years)  Last completed: 2022     AUG 14  2034 DTAP/TDAP/TD IMMUNIZATION (8 - Td or Tdap)  Last completed: Aug 14, 2024       HISTORY:  OB History    Para Term  AB Living   1 1 1 0 0 1   SAB IAB Ectopic Multiple Live Births   0 0 0 0 1      # Outcome Date GA Lbr Josh/2nd Weight Sex Type Anes PTL Lv   1 Term 11/15/24 41w2d 08:03 / 01:31 3.32 kg (7 lb 5.1 oz) M Vag-Vacuum Nitrous, EPI N DAMION      Complications: Preeclampsia/Hypertension      Name: Enma Cerrato      Apgar1: 8  Apgar5: 9       Patient Active Problem List   Diagnosis    History of indigestion    History of anxiety state    Lactating mother    Stress incontinence in female     Past Surgical History:   Procedure Laterality Date    TONSILLECTOMY & ADENOIDECTOMY      childhood      Social History     Tobacco Use    Smoking status: Never     Passive exposure: Never    Smokeless tobacco: Never   Substance Use Topics    Alcohol use: Not Currently      Problem (# of Occurrences) Relation (Name,Age of Onset)    Diabetes (1) Maternal Grandmother (Lisa)    Hypertension (1) Mother (Yenny)    Cerebrovascular Disease (2) Maternal Grandmother (Lisa), Paternal Grandmother (Renetta): only mini    Prostate Cancer (1) Maternal Grandfather (Eric)    Other Cancer (1) Paternal Grandmother (Renetta): NH lymphoma    Hyperlipidemia (2) Mother (Yenny): only borderline to high, Father (Miguel): only borderline to high    Bladder Cancer (1) Maternal Grandfather (Hudson)    Lymphoma (1) Paternal Grandmother (Renetta)           "    Current Outpatient Medications   Medication Sig Dispense Refill    Ashwagandha (ASHWAGANDHA 35) 120 MG CAPS       MAGNESIUM CITRATE PO       Prenatal Vit-Fe Fumarate-FA (PNV PRENATAL PLUS MULTIVITAMIN) 27-1 MG TABS per tablet Take 1 tablet by mouth daily      vitamin B-Complex Take 1 tablet by mouth daily      Vitamin D3 (CHOLECALCIFEROL) 25 mcg (1000 units) tablet Take by mouth daily       No current facility-administered medications for this visit.     Allergies   Allergen Reactions    Cefaclor Rash     Pt states she had allergy as a infant (Rash)          Past medical, surgical, social and family histories were reviewed and updated in EPIC.    ROS:   12 point review of systems negative other than symptoms noted below or in the HPI.  No urinary frequency or dysuria, bladder or kidney problems, Normal menstrual cycles    EXAM:  /76   Ht 1.702 m (5' 7\")   Wt 96.2 kg (212 lb)   LMP 03/31/2025 (Approximate)   Breastfeeding Yes   BMI 33.20 kg/m     BMI: Body mass index is 33.2 kg/m .    PHYSICAL EXAM:  Constitutional:   Appearance: Well nourished, well developed, alert, in no acute distress  Breasts: Inspection of Breasts:  No lymphadenopathy present., Palpation of Breasts and Axillae:  No masses present on palpation, no breast tenderness., Axillary Lymph Nodes:  No lymphadenopathy present., and No nodularity, asymmetry or nipple discharge bilaterally.  Lymphatic: Lymph Nodes:  No other lymphadenopathy present  Skin:  General Inspection:  No rashes present, no lesions present, no areas of  discoloration  Neurologic:    Mental Status:  Oriented X3.  Normal strength and tone, sensory exam                grossly normal, mentation intact and speech normal.    Psychiatric:   Mentation appears normal and affect normal/bright.         Pelvic Exam:  External Genitalia:     Normal appearance for age, no discharge present, no tenderness present, no inflammatory lesions present, color normal  Vagina:     Normal " vaginal vault without central or paravaginal defects, no discharge present, no inflammatory lesions present, no masses present  Bladder:     Nontender to palpation  Urethra:   Urethral Body:  Urethra palpation normal, urethra structural support normal   Urethral Meatus:  No erythema or lesions present  Cervix:     Appearance healthy, no lesions present, nontender to palpation, no bleeding present  Uterus:     Uterus: firm, normal sized and nontender, anteverted in position.   Adnexa:     No adnexal tenderness present, no adnexal masses present  Perineum:     Perineum within normal limits, no evidence of trauma, no rashes or skin lesions present  Anus:     Anus within normal limits, no hemorrhoids present  Inguinal Lymph Nodes:     No lymphadenopathy present  Pubic Hair:     Normal pubic hair distribution for age  Genitalia and Groin:     No rashes present, no lesions present, no areas of discoloration, no masses present    COUNSELING:   Special attention given to:        Regular exercise       Healthy diet/nutrition    BMI: Body mass index is 33.2 kg/m .  Weight management plan: Discussed healthy diet and exercise guidelines    ASSESSMENT:  34 year old female with satisfactory annual exam.    ICD-10-CM    1. Encounter for breast and pelvic examination  Z01.419       2. Lactating mother  Z39.1       3. Screening for cervical cancer  Z12.4 HPV and Gynecologic Cytology Panel - Recommended Age 30 - 65 Years          PLAN:  34-year-old female with a normal GYN exam.  Pap smear was collected and if it is normal she can repeat in 3 years.  We discussed the postpartum period and her ultrasound findings.  She had a complex cyst on her right ovary that has been present for quite a while and does not seem to be changing.  Questionable dermoid?   Her EMS was normal and her uterus was normal.  We have asked her to track her cycles and if she has any bleeding over 10 days she is to call.    MARIA D Alfredo CNP

## 2025-04-22 LAB
HPV HR 12 DNA CVX QL NAA+PROBE: NEGATIVE
HPV16 DNA CVX QL NAA+PROBE: NEGATIVE
HPV18 DNA CVX QL NAA+PROBE: NEGATIVE
HUMAN PAPILLOMA VIRUS FINAL DIAGNOSIS: NORMAL

## 2025-05-19 ENCOUNTER — MEDICAL CORRESPONDENCE (OUTPATIENT)
Dept: HEALTH INFORMATION MANAGEMENT | Facility: CLINIC | Age: 35
End: 2025-05-19
Payer: COMMERCIAL